# Patient Record
Sex: FEMALE | Race: WHITE | NOT HISPANIC OR LATINO | Employment: OTHER | URBAN - NONMETROPOLITAN AREA
[De-identification: names, ages, dates, MRNs, and addresses within clinical notes are randomized per-mention and may not be internally consistent; named-entity substitution may affect disease eponyms.]

---

## 2017-10-09 ENCOUNTER — APPOINTMENT (OUTPATIENT)
Dept: GENERAL RADIOLOGY | Facility: HOSPITAL | Age: 37
End: 2017-10-09

## 2017-10-09 ENCOUNTER — APPOINTMENT (OUTPATIENT)
Dept: CT IMAGING | Facility: HOSPITAL | Age: 37
End: 2017-10-09

## 2017-10-09 ENCOUNTER — HOSPITAL ENCOUNTER (EMERGENCY)
Facility: HOSPITAL | Age: 37
Discharge: HOME | End: 2017-10-09
Attending: EMERGENCY MEDICINE | Admitting: EMERGENCY MEDICINE

## 2017-10-09 VITALS
WEIGHT: 192 LBS | HEIGHT: 62 IN | TEMPERATURE: 98.6 F | HEART RATE: 116 BPM | BODY MASS INDEX: 35.33 KG/M2 | SYSTOLIC BLOOD PRESSURE: 126 MMHG | RESPIRATION RATE: 16 BRPM | DIASTOLIC BLOOD PRESSURE: 93 MMHG | OXYGEN SATURATION: 96 %

## 2017-10-09 DIAGNOSIS — M25.562 ACUTE PAIN OF BOTH KNEES: ICD-10-CM
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
## 2017-10-09 DIAGNOSIS — M54.50 ACUTE BILATERAL LOW BACK PAIN WITHOUT SCIATICA: Primary | ICD-10-CM

## 2017-10-09 DIAGNOSIS — M25.552 PAIN OF BOTH HIP JOINTS: ICD-10-CM

## 2017-10-09 DIAGNOSIS — M25.551 PAIN OF BOTH HIP JOINTS: ICD-10-CM

## 2017-10-09 DIAGNOSIS — M25.561 ACUTE PAIN OF BOTH KNEES: ICD-10-CM

## 2017-10-09 LAB
ALBUMIN SERPL-MCNC: 5 G/DL (ref 3.4–4.8)
ALBUMIN/GLOB SERPL: 1.3 G/DL (ref 1.1–1.8)
ALP SERPL-CCNC: 94 U/L (ref 38–126)
ALT SERPL W P-5'-P-CCNC: 50 U/L (ref 9–52)
ANION GAP SERPL CALCULATED.3IONS-SCNC: 13 MMOL/L (ref 5–15)
AST SERPL-CCNC: 25 U/L (ref 14–36)
BASOPHILS # BLD AUTO: 0.02 10*3/MM3 (ref 0–0.2)
BASOPHILS NFR BLD AUTO: 0.2 % (ref 0–2)
BILIRUB SERPL-MCNC: 0.5 MG/DL (ref 0.2–1.3)
BUN BLD-MCNC: 17 MG/DL (ref 7–21)
BUN/CREAT SERPL: 17.7 (ref 7–25)
CALCIUM SPEC-SCNC: 10.3 MG/DL (ref 8.4–10.2)
CHLORIDE SERPL-SCNC: 99 MMOL/L (ref 95–110)
CO2 SERPL-SCNC: 26 MMOL/L (ref 22–31)
CREAT BLD-MCNC: 0.96 MG/DL (ref 0.5–1)
DEPRECATED RDW RBC AUTO: 45.2 FL (ref 36.4–46.3)
EOSINOPHIL # BLD AUTO: 0.02 10*3/MM3 (ref 0–0.7)
EOSINOPHIL NFR BLD AUTO: 0.2 % (ref 0–7)
ERYTHROCYTE [DISTWIDTH] IN BLOOD BY AUTOMATED COUNT: 13.8 % (ref 11.5–14.5)
GFR SERPL CREATININE-BSD FRML MDRD: 66 ML/MIN/1.73 (ref 64–149)
GLOBULIN UR ELPH-MCNC: 3.9 GM/DL (ref 2.3–3.5)
GLUCOSE BLD-MCNC: 115 MG/DL (ref 60–100)
HCT VFR BLD AUTO: 49.1 % (ref 35–45)
HGB BLD-MCNC: 16.7 G/DL (ref 12–15.5)
HOLD SPECIMEN: NORMAL
HOLD SPECIMEN: NORMAL
IMM GRANULOCYTES # BLD: 0.07 10*3/MM3 (ref 0–0.02)
IMM GRANULOCYTES NFR BLD: 0.6 % (ref 0–0.5)
INR PPP: 0.96 (ref 0.8–1.2)
LYMPHOCYTES # BLD AUTO: 1.98 10*3/MM3 (ref 0.6–4.2)
LYMPHOCYTES NFR BLD AUTO: 17.9 % (ref 10–50)
MCH RBC QN AUTO: 30.8 PG (ref 26.5–34)
MCHC RBC AUTO-ENTMCNC: 34 G/DL (ref 31.4–36)
MCV RBC AUTO: 90.4 FL (ref 80–98)
MONOCYTES # BLD AUTO: 0.88 10*3/MM3 (ref 0–0.9)
MONOCYTES NFR BLD AUTO: 7.9 % (ref 0–12)
NEUTROPHILS # BLD AUTO: 8.12 10*3/MM3 (ref 2–8.6)
NEUTROPHILS NFR BLD AUTO: 73.2 % (ref 37–80)
PLATELET # BLD AUTO: 331 10*3/MM3 (ref 150–450)
PMV BLD AUTO: 9.4 FL (ref 8–12)
POTASSIUM BLD-SCNC: 4.4 MMOL/L (ref 3.5–5.1)
PROT SERPL-MCNC: 8.9 G/DL (ref 6.3–8.6)
PROTHROMBIN TIME: 12.7 SECONDS (ref 11.1–15.3)
RBC # BLD AUTO: 5.43 10*6/MM3 (ref 3.77–5.16)
SODIUM BLD-SCNC: 138 MMOL/L (ref 137–145)
WBC NRBC COR # BLD: 11.09 10*3/MM3 (ref 3.2–9.8)
WHOLE BLOOD HOLD SPECIMEN: NORMAL
WHOLE BLOOD HOLD SPECIMEN: NORMAL

## 2017-10-09 PROCEDURE — 25010000002 HYDROMORPHONE PER 4 MG: Performed by: EMERGENCY MEDICINE

## 2017-10-09 PROCEDURE — 25010000002 ONDANSETRON PER 1 MG: Performed by: PHYSICIAN ASSISTANT

## 2017-10-09 PROCEDURE — 96375 TX/PRO/DX INJ NEW DRUG ADDON: CPT

## 2017-10-09 PROCEDURE — 72072 X-RAY EXAM THORAC SPINE 3VWS: CPT

## 2017-10-09 PROCEDURE — 99283 EMERGENCY DEPT VISIT LOW MDM: CPT

## 2017-10-09 PROCEDURE — 86901 BLOOD TYPING SEROLOGIC RH(D): CPT | Performed by: EMERGENCY MEDICINE

## 2017-10-09 PROCEDURE — 85610 PROTHROMBIN TIME: CPT | Performed by: EMERGENCY MEDICINE

## 2017-10-09 PROCEDURE — 36415 COLL VENOUS BLD VENIPUNCTURE: CPT

## 2017-10-09 PROCEDURE — 72170 X-RAY EXAM OF PELVIS: CPT

## 2017-10-09 PROCEDURE — 86900 BLOOD TYPING SEROLOGIC ABO: CPT | Performed by: EMERGENCY MEDICINE

## 2017-10-09 PROCEDURE — 73564 X-RAY EXAM KNEE 4 OR MORE: CPT

## 2017-10-09 PROCEDURE — 70450 CT HEAD/BRAIN W/O DYE: CPT

## 2017-10-09 PROCEDURE — 73590 X-RAY EXAM OF LOWER LEG: CPT

## 2017-10-09 PROCEDURE — 96374 THER/PROPH/DIAG INJ IV PUSH: CPT

## 2017-10-09 PROCEDURE — 86850 RBC ANTIBODY SCREEN: CPT | Performed by: EMERGENCY MEDICINE

## 2017-10-09 PROCEDURE — 80053 COMPREHEN METABOLIC PANEL: CPT | Performed by: EMERGENCY MEDICINE

## 2017-10-09 PROCEDURE — 73552 X-RAY EXAM OF FEMUR 2/>: CPT

## 2017-10-09 PROCEDURE — 72100 X-RAY EXAM L-S SPINE 2/3 VWS: CPT

## 2017-10-09 PROCEDURE — 96376 TX/PRO/DX INJ SAME DRUG ADON: CPT

## 2017-10-09 PROCEDURE — 85025 COMPLETE CBC W/AUTO DIFF WBC: CPT | Performed by: EMERGENCY MEDICINE

## 2017-10-09 PROCEDURE — 72125 CT NECK SPINE W/O DYE: CPT

## 2017-10-09 RX ORDER — CYCLOBENZAPRINE HCL 10 MG
10 TABLET ORAL 3 TIMES DAILY PRN
Qty: 30 TABLET | Refills: 0 | Status: SHIPPED | OUTPATIENT
Start: 2017-10-09 | End: 2017-10-19

## 2017-10-09 RX ORDER — IBUPROFEN 800 MG/1
800 TABLET ORAL EVERY 6 HOURS PRN
Qty: 40 TABLET | Refills: 0 | Status: SHIPPED | OUTPATIENT
Start: 2017-10-09 | End: 2017-10-19

## 2017-10-09 RX ORDER — ONDANSETRON 2 MG/ML
4 INJECTION INTRAMUSCULAR; INTRAVENOUS ONCE
Status: COMPLETED | OUTPATIENT
Start: 2017-10-09 | End: 2017-10-09

## 2017-10-09 RX ADMIN — HYDROMORPHONE HYDROCHLORIDE 1 MG: 1 INJECTION, SOLUTION INTRAMUSCULAR; INTRAVENOUS; SUBCUTANEOUS at 18:42

## 2017-10-09 RX ADMIN — HYDROMORPHONE HYDROCHLORIDE 1 MG: 1 INJECTION, SOLUTION INTRAMUSCULAR; INTRAVENOUS; SUBCUTANEOUS at 20:53

## 2017-10-09 RX ADMIN — ONDANSETRON 4 MG: 2 INJECTION INTRAMUSCULAR; INTRAVENOUS at 18:42

## 2017-10-10 LAB
ABO GROUP BLD: NORMAL
BLD GP AB SCN SERPL QL: NEGATIVE
Lab: NORMAL
RH BLD: POSITIVE

## 2017-10-12 ENCOUNTER — HOSPITAL ENCOUNTER (EMERGENCY)
Facility: HOSPITAL | Age: 37
Discharge: HOME | End: 2017-10-12
Attending: EMERGENCY MEDICINE | Admitting: EMERGENCY MEDICINE

## 2017-10-12 ENCOUNTER — APPOINTMENT (OUTPATIENT)
Dept: GENERAL RADIOLOGY | Facility: HOSPITAL | Age: 37
End: 2017-10-12

## 2017-10-12 ENCOUNTER — APPOINTMENT (OUTPATIENT)
Dept: ULTRASOUND IMAGING | Facility: HOSPITAL | Age: 37
End: 2017-10-12

## 2017-10-12 VITALS
OXYGEN SATURATION: 98 % | DIASTOLIC BLOOD PRESSURE: 61 MMHG | WEIGHT: 185.2 LBS | BODY MASS INDEX: 36.36 KG/M2 | HEART RATE: 92 BPM | TEMPERATURE: 97.6 F | RESPIRATION RATE: 18 BRPM | SYSTOLIC BLOOD PRESSURE: 110 MMHG | HEIGHT: 60 IN

## 2017-10-12 DIAGNOSIS — R11.2 NON-INTRACTABLE VOMITING WITH NAUSEA, UNSPECIFIED VOMITING TYPE: ICD-10-CM

## 2017-10-12 DIAGNOSIS — F41.1 GENERALIZED ANXIETY DISORDER: ICD-10-CM

## 2017-10-12 DIAGNOSIS — R10.33 PERIUMBILICAL ABDOMINAL PAIN: Primary | ICD-10-CM

## 2017-10-12 DIAGNOSIS — F17.200 TOBACCO DEPENDENCE SYNDROME: ICD-10-CM

## 2017-10-12 LAB
ALBUMIN SERPL-MCNC: 4.3 G/DL (ref 3.4–4.8)
ALBUMIN/GLOB SERPL: 1.4 G/DL (ref 1.1–1.8)
ALP SERPL-CCNC: 73 U/L (ref 38–126)
ALT SERPL W P-5'-P-CCNC: 44 U/L (ref 9–52)
ANION GAP SERPL CALCULATED.3IONS-SCNC: 11 MMOL/L (ref 5–15)
AST SERPL-CCNC: 17 U/L (ref 14–36)
BASOPHILS # BLD AUTO: 0.03 10*3/MM3 (ref 0–0.2)
BASOPHILS NFR BLD AUTO: 0.2 % (ref 0–2)
BILIRUB SERPL-MCNC: 0.6 MG/DL (ref 0.2–1.3)
BUN BLD-MCNC: 11 MG/DL (ref 7–21)
BUN/CREAT SERPL: 13.6 (ref 7–25)
CALCIUM SPEC-SCNC: 9.6 MG/DL (ref 8.4–10.2)
CHLORIDE SERPL-SCNC: 99 MMOL/L (ref 95–110)
CO2 SERPL-SCNC: 26 MMOL/L (ref 22–31)
CREAT BLD-MCNC: 0.81 MG/DL (ref 0.5–1)
DEPRECATED RDW RBC AUTO: 44.3 FL (ref 36.4–46.3)
EOSINOPHIL # BLD AUTO: 0.05 10*3/MM3 (ref 0–0.7)
EOSINOPHIL NFR BLD AUTO: 0.4 % (ref 0–7)
ERYTHROCYTE [DISTWIDTH] IN BLOOD BY AUTOMATED COUNT: 13.5 % (ref 11.5–14.5)
GFR SERPL CREATININE-BSD FRML MDRD: 80 ML/MIN/1.73 (ref 64–149)
GLOBULIN UR ELPH-MCNC: 3.1 GM/DL (ref 2.3–3.5)
GLUCOSE BLD-MCNC: 106 MG/DL (ref 60–100)
HCT VFR BLD AUTO: 43.6 % (ref 35–45)
HGB BLD-MCNC: 14.7 G/DL (ref 12–15.5)
IMM GRANULOCYTES # BLD: 0.04 10*3/MM3 (ref 0–0.02)
IMM GRANULOCYTES NFR BLD: 0.3 % (ref 0–0.5)
LIPASE SERPL-CCNC: 129 U/L (ref 23–300)
LYMPHOCYTES # BLD AUTO: 2.65 10*3/MM3 (ref 0.6–4.2)
LYMPHOCYTES NFR BLD AUTO: 21.9 % (ref 10–50)
MCH RBC QN AUTO: 30.5 PG (ref 26.5–34)
MCHC RBC AUTO-ENTMCNC: 33.7 G/DL (ref 31.4–36)
MCV RBC AUTO: 90.5 FL (ref 80–98)
MONOCYTES # BLD AUTO: 0.89 10*3/MM3 (ref 0–0.9)
MONOCYTES NFR BLD AUTO: 7.4 % (ref 0–12)
NEUTROPHILS # BLD AUTO: 8.43 10*3/MM3 (ref 2–8.6)
NEUTROPHILS NFR BLD AUTO: 69.8 % (ref 37–80)
PLATELET # BLD AUTO: 288 10*3/MM3 (ref 150–450)
PMV BLD AUTO: 9.2 FL (ref 8–12)
POTASSIUM BLD-SCNC: 3.4 MMOL/L (ref 3.5–5.1)
PROT SERPL-MCNC: 7.4 G/DL (ref 6.3–8.6)
RBC # BLD AUTO: 4.82 10*6/MM3 (ref 3.77–5.16)
SODIUM BLD-SCNC: 136 MMOL/L (ref 137–145)
WBC NRBC COR # BLD: 12.09 10*3/MM3 (ref 3.2–9.8)

## 2017-10-12 PROCEDURE — 76700 US EXAM ABDOM COMPLETE: CPT

## 2017-10-12 PROCEDURE — 96374 THER/PROPH/DIAG INJ IV PUSH: CPT

## 2017-10-12 PROCEDURE — 25010000002 ONDANSETRON PER 1 MG: Performed by: PHYSICIAN ASSISTANT

## 2017-10-12 PROCEDURE — 99283 EMERGENCY DEPT VISIT LOW MDM: CPT

## 2017-10-12 PROCEDURE — 85025 COMPLETE CBC W/AUTO DIFF WBC: CPT | Performed by: PHYSICIAN ASSISTANT

## 2017-10-12 PROCEDURE — 74000 HC ABDOMEN KUB: CPT

## 2017-10-12 PROCEDURE — 96361 HYDRATE IV INFUSION ADD-ON: CPT

## 2017-10-12 PROCEDURE — 80053 COMPREHEN METABOLIC PANEL: CPT | Performed by: PHYSICIAN ASSISTANT

## 2017-10-12 PROCEDURE — 83690 ASSAY OF LIPASE: CPT | Performed by: PHYSICIAN ASSISTANT

## 2017-10-12 RX ORDER — ALPRAZOLAM 1 MG/1
0.5 TABLET ORAL 3 TIMES DAILY PRN
Qty: 9 TABLET | Refills: 0 | Status: SHIPPED | OUTPATIENT
Start: 2017-10-12 | End: 2017-10-15

## 2017-10-12 RX ORDER — ONDANSETRON 2 MG/ML
4 INJECTION INTRAMUSCULAR; INTRAVENOUS ONCE
Status: COMPLETED | OUTPATIENT
Start: 2017-10-12 | End: 2017-10-12

## 2017-10-12 RX ORDER — SODIUM CHLORIDE 0.9 % (FLUSH) 0.9 %
10 SYRINGE (ML) INJECTION AS NEEDED
Status: DISCONTINUED | OUTPATIENT
Start: 2017-10-12 | End: 2017-10-12 | Stop reason: HOSPADM

## 2017-10-12 RX ORDER — SODIUM CHLORIDE 9 MG/ML
1000 INJECTION, SOLUTION INTRAVENOUS ONCE
Status: COMPLETED | OUTPATIENT
Start: 2017-10-12 | End: 2017-10-12

## 2017-10-12 RX ORDER — ONDANSETRON 4 MG/1
4 TABLET, FILM COATED ORAL EVERY 6 HOURS
Qty: 12 TABLET | Refills: 0 | Status: SHIPPED | OUTPATIENT
Start: 2017-10-12 | End: 2017-10-15

## 2017-10-12 RX ADMIN — Medication 10 ML: at 16:33

## 2017-10-12 RX ADMIN — ONDANSETRON 4 MG: 2 INJECTION INTRAMUSCULAR; INTRAVENOUS at 16:33

## 2017-10-12 RX ADMIN — SODIUM CHLORIDE 1000 ML: 9 INJECTION, SOLUTION INTRAVENOUS at 16:33
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
## 2017-10-12 NOTE — ED NOTES
"Pt is presented to ED with c/o severe abdominal pain.  Pt states she suddenly started having severe cramping that is located around her \"belly button\" and vomiting just pta.     Alem Armenta RN  10/12/17 8622    "

## 2017-10-16 DIAGNOSIS — M79.641 BILATERAL HAND PAIN: Primary | ICD-10-CM

## 2017-10-16 DIAGNOSIS — M79.642 BILATERAL HAND PAIN: Primary | ICD-10-CM
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
## 2017-10-27 ENCOUNTER — HOSPITAL ENCOUNTER (EMERGENCY)
Facility: HOSPITAL | Age: 37
Discharge: HOME | End: 2017-10-27
Attending: FAMILY MEDICINE | Admitting: FAMILY MEDICINE

## 2017-10-27 VITALS
HEART RATE: 93 BPM | OXYGEN SATURATION: 97 % | TEMPERATURE: 98.1 F | DIASTOLIC BLOOD PRESSURE: 92 MMHG | RESPIRATION RATE: 18 BRPM | HEIGHT: 60 IN | WEIGHT: 190 LBS | SYSTOLIC BLOOD PRESSURE: 147 MMHG | BODY MASS INDEX: 37.3 KG/M2

## 2017-10-27 DIAGNOSIS — F41.9 ANXIETY: Primary | ICD-10-CM

## 2017-10-27 DIAGNOSIS — F41.0 PANIC ATTACKS: ICD-10-CM

## 2017-10-27 PROCEDURE — 99283 EMERGENCY DEPT VISIT LOW MDM: CPT

## 2017-10-27 RX ORDER — TRAZODONE HYDROCHLORIDE 100 MG/1
100 TABLET ORAL 3 TIMES DAILY
Qty: 21 TABLET | Refills: 0 | Status: SHIPPED | OUTPATIENT
Start: 2017-10-27 | End: 2017-11-03

## 2017-10-27 RX ORDER — BUPRENORPHINE HYDROCHLORIDE AND NALOXONE HYDROCHLORIDE DIHYDRATE 8; 2 MG/1; MG/1
TABLET SUBLINGUAL DAILY
COMMUNITY

## 2017-10-27 RX ORDER — ALPRAZOLAM 1 MG/1
1 TABLET ORAL ONCE
Status: COMPLETED | OUTPATIENT
Start: 2017-10-27 | End: 2017-10-27

## 2017-10-27 RX ADMIN — ALPRAZOLAM 1 MG: 1 TABLET ORAL at 11:01

## 2017-10-30 NOTE — ED PROVIDER NOTES
Subjective   Patient is a 37 y.o. female presenting with anxiety.   History provided by:  Patient   used: No    Anxiety   Presents for initial visit. Onset was in the past 7 days. The problem has been unchanged. Symptoms include decreased concentration, depressed mood, dry mouth, excessive worry, insomnia, irritability, nausea, nervous/anxious behavior and panic. Patient reports no chest pain, compulsions, confusion, dizziness, feeling of choking, hyperventilation, impotence, malaise, muscle tension, obsessions, palpitations, restlessness, shortness of breath or suicidal ideas. Symptoms occur most days. The most recent episode lasted 5 minutes. The severity of symptoms is mild. The symptoms are aggravated by family issues. The patient sleeps 3 hours per night. The quality of sleep is poor. Nighttime awakenings: several.     Risk factors include a major life event. Her past medical history is significant for anxiety/panic attacks. There is no history of anemia, arrhythmia, asthma, bipolar disorder, CAD, CHF, chronic lung disease, depression, fibromyalgia, hyperthyroidism or suicide attempts. Past treatments include SSRIs. The treatment provided no relief. Compliance with prior treatments has been good.       Review of Systems   Constitutional: Positive for irritability. Negative for activity change, appetite change, chills, diaphoresis, fatigue, fever and unexpected weight change.   HENT: Negative for congestion, dental problem, drooling, ear discharge, ear pain, facial swelling, hearing loss, mouth sores, nosebleeds, postnasal drip, rhinorrhea, sinus pain, sinus pressure, sneezing, sore throat, tinnitus, trouble swallowing and voice change.    Eyes: Negative.    Respiratory: Negative.  Negative for shortness of breath.    Cardiovascular: Negative.  Negative for chest pain and palpitations.   Gastrointestinal: Positive for nausea. Negative for abdominal distention, abdominal pain, anal bleeding,  blood in stool, constipation, diarrhea, rectal pain and vomiting.   Endocrine: Negative.    Genitourinary: Negative.  Negative for impotence.   Musculoskeletal: Negative.    Skin: Negative.    Allergic/Immunologic: Negative.    Neurological: Negative.  Negative for dizziness.   Hematological: Negative.    Psychiatric/Behavioral: Positive for decreased concentration. Negative for agitation, behavioral problems, confusion, dysphoric mood, hallucinations, self-injury, sleep disturbance and suicidal ideas. The patient is nervous/anxious and has insomnia. The patient is not hyperactive.        Past Medical History:   Diagnosis Date   • Acute maxillary sinusitis    • Adjustment disorder with anxiety    • Adjustment disorder with anxious mood    • Biliary calculus     post cholecystectomy      • Candidiasis    • Central abdominal pain    • Chronic pain    • Community acquired pneumonia    • Dental abscess    • Dental caries     o/e   • Depressive disorder      with some anxiety      • Depressive disorder     not elsewhere classified      • Essential hypertension    • Essential hypertension    • Frequent hospital admissions    • Gastroesophageal reflux disease    • Gastroesophageal reflux disease    • Generalized anxiety disorder    • Hyperlipidemia    • Hyperreflexia    • Influenza     needs immunization   • Irritable bowel syndrome    • Major depressive disorder    • Malaise and fatigue    • Multiple joint pain    • Nausea and vomiting    • Need for prophylactic vaccination against Streptococcus pneumoniae (pneumococcus)    • Obesity    • Rhonchi     low-pitched   • Seizures    • Tobacco dependence syndrome    • Upper respiratory infection        Allergies   Allergen Reactions   • Ceclor [Cefaclor]    • Other      darvocet-N 100   • Reglan [Metoclopramide]    • Valtrex [Valacyclovir Hcl]    • Zithromax [Azithromycin]      z-iam       Past Surgical History:   Procedure Laterality Date   • CHOLECYSTECTOMY      laparoscopic  (With cholangiogram. Symptomatic gallstones.)   06/03/2015    • ENDOSCOPY      w/ biopsy [id number] (Gastritis found inthe body of the stomach. EGD with biopsy.)   07/21/2015    • ENDOSCOPY      w/ tube 90907 (Normal esophagus. Gastritis in stomach. Biopsy taken. Normal duodenum. Biopsy taken.)   10/19/2011    • HYSTEROSCOPY  07/09/2008   • TOTAL ABDOMINAL HYSTERECTOMY WITH SALPINGO OOPHORECTOMY  09/03/2008   • TUBAL ABDOMINAL LIGATION  08/21/2006   • VAGINAL DELIVERY      , P2, had pre-eclampsia with both       Family History   Problem Relation Age of Onset   • Other Other      depressive disorder   • Diabetes Other    • Hypertension Other    • Other Other      Mother is diabetic, hypertensive, anxiety, depression. Father is 61, diabetic, hypertensive, had a CABG, first MI was at 50. He has had CVA's, TIA's. 10-year-old sister who is Type I diabetic       Social History     Social History   • Marital status:      Spouse name: N/A   • Number of children: N/A   • Years of education: N/A     Social History Main Topics   • Smoking status: Current Every Day Smoker     Packs/day: 1.00     Types: Cigarettes   • Smokeless tobacco: Never Used   • Alcohol use No   • Drug use: No   • Sexual activity: Not Asked     Other Topics Concern   • None     Social History Narrative   • None           Objective   Physical Exam   Constitutional: She is oriented to person, place, and time. She appears well-developed and well-nourished. No distress.   HENT:   Head: Normocephalic and atraumatic.   Eyes: Conjunctivae and EOM are normal. Pupils are equal, round, and reactive to light. Right eye exhibits no discharge. Left eye exhibits no discharge. No scleral icterus.   Neck: Normal range of motion. Neck supple. No JVD present. No tracheal deviation present. No thyromegaly present.   Cardiovascular: Normal rate, regular rhythm, normal heart sounds and intact distal pulses.  Exam reveals no gallop and no friction rub.    No murmur  heard.  Pulmonary/Chest: Effort normal and breath sounds normal. No stridor. No respiratory distress. She has no wheezes. She has no rales. She exhibits no tenderness.   Abdominal: Soft. Bowel sounds are normal. She exhibits no distension and no mass. There is no tenderness. There is no rebound and no guarding. No hernia.   Musculoskeletal: Normal range of motion. She exhibits no edema, tenderness or deformity.   Lymphadenopathy:     She has no cervical adenopathy.   Neurological: She is alert and oriented to person, place, and time. She has normal reflexes.   Skin: Skin is warm and dry. No rash noted. She is not diaphoretic. No erythema. No pallor.   Psychiatric: Her speech is normal and behavior is normal. Judgment and thought content normal. Her mood appears anxious. Her affect is not angry, not blunt, not labile and not inappropriate. She is not actively hallucinating. Cognition and memory are normal. She exhibits a depressed mood. She is attentive.   Nursing note and vitals reviewed.      Procedures         ED Course  ED Course                  MDM  Number of Diagnoses or Management Options  Anxiety:   Panic attacks:       Final diagnoses:   Anxiety   Panic attacks            JAIMIE Springer  10/30/17 3234

## 2017-12-19 ENCOUNTER — OFFICE VISIT (OUTPATIENT)
Dept: ORTHOPEDIC SURGERY | Facility: CLINIC | Age: 37
End: 2017-12-19

## 2017-12-19 VITALS — HEIGHT: 60 IN | BODY MASS INDEX: 37.5 KG/M2 | WEIGHT: 191 LBS

## 2017-12-19 DIAGNOSIS — M79.641 PAIN IN BOTH HANDS: ICD-10-CM

## 2017-12-19 DIAGNOSIS — R20.2 NUMBNESS AND TINGLING IN BOTH HANDS: Primary | ICD-10-CM

## 2017-12-19 DIAGNOSIS — R20.0 NUMBNESS AND TINGLING IN BOTH HANDS: Primary | ICD-10-CM

## 2017-12-19 DIAGNOSIS — M25.562 ACUTE PAIN OF LEFT KNEE: ICD-10-CM

## 2017-12-19 DIAGNOSIS — M79.642 PAIN IN BOTH HANDS: ICD-10-CM

## 2017-12-19 DIAGNOSIS — M25.462 EFFUSION OF KNEE JOINT, LEFT: ICD-10-CM

## 2017-12-19 PROCEDURE — 99214 OFFICE O/P EST MOD 30 MIN: CPT | Performed by: NURSE PRACTITIONER

## 2017-12-19 RX ORDER — DICLOFENAC SODIUM 75 MG/1
75 TABLET, DELAYED RELEASE ORAL EVERY 12 HOURS PRN
Qty: 60 TABLET | Refills: 1 | Status: SHIPPED | OUTPATIENT
Start: 2017-12-19 | End: 2017-12-21 | Stop reason: SDUPTHER
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
## 2017-12-21 ENCOUNTER — TELEPHONE (OUTPATIENT)
Dept: ORTHOPEDIC SURGERY | Facility: CLINIC | Age: 37
End: 2017-12-21

## 2017-12-21 RX ORDER — DICLOFENAC SODIUM 75 MG/1
75 TABLET, DELAYED RELEASE ORAL EVERY 12 HOURS PRN
Qty: 60 TABLET | Refills: 1 | Status: SHIPPED | OUTPATIENT
Start: 2017-12-21 | End: 2018-01-20

## 2017-12-29 ENCOUNTER — APPOINTMENT (OUTPATIENT)
Dept: MRI IMAGING | Facility: HOSPITAL | Age: 37
End: 2017-12-29

## 2018-01-11 ENCOUNTER — HOSPITAL ENCOUNTER (OUTPATIENT)
Dept: MRI IMAGING | Facility: HOSPITAL | Age: 38
Discharge: HOME | End: 2018-01-11
Admitting: NURSE PRACTITIONER

## 2018-01-11 DIAGNOSIS — M25.562 ACUTE PAIN OF LEFT KNEE: ICD-10-CM

## 2018-01-11 DIAGNOSIS — M25.462 EFFUSION OF KNEE JOINT, LEFT: ICD-10-CM

## 2018-01-11 PROCEDURE — 73721 MRI JNT OF LWR EXTRE W/O DYE: CPT

## 2018-01-18 RX ORDER — DICLOFENAC SODIUM 75 MG/1
TABLET, DELAYED RELEASE ORAL
Qty: 60 TABLET | Refills: 1 | Status: SHIPPED | OUTPATIENT
Start: 2018-01-18 | End: 2019-02-21 | Stop reason: HOSPADM

## 2018-02-07 ENCOUNTER — APPOINTMENT (OUTPATIENT)
Dept: NEUROLOGY | Facility: HOSPITAL | Age: 38
End: 2018-02-07

## 2018-10-17 ENCOUNTER — OFFICE VISIT (OUTPATIENT)
Dept: ORTHOPEDIC SURGERY | Facility: CLINIC | Age: 38
End: 2018-10-17

## 2018-10-17 VITALS — BODY MASS INDEX: 35.79 KG/M2 | WEIGHT: 202 LBS | HEIGHT: 63 IN

## 2018-10-17 DIAGNOSIS — M25.562 CHRONIC PAIN OF LEFT KNEE: Primary | ICD-10-CM

## 2018-10-17 DIAGNOSIS — G89.29 CHRONIC PAIN OF LEFT KNEE: Primary | ICD-10-CM

## 2018-10-17 DIAGNOSIS — M25.462 EFFUSION OF KNEE JOINT, LEFT: ICD-10-CM

## 2018-10-17 PROCEDURE — 96372 THER/PROPH/DIAG INJ SC/IM: CPT | Performed by: NURSE PRACTITIONER

## 2018-10-17 PROCEDURE — 20610 DRAIN/INJ JOINT/BURSA W/O US: CPT | Performed by: NURSE PRACTITIONER

## 2018-10-17 PROCEDURE — 99214 OFFICE O/P EST MOD 30 MIN: CPT | Performed by: NURSE PRACTITIONER

## 2018-10-17 RX ORDER — BUPROPION HYDROCHLORIDE 150 MG/1
150 TABLET ORAL DAILY
COMMUNITY
End: 2019-02-17

## 2018-10-17 RX ORDER — KETOROLAC TROMETHAMINE 30 MG/ML
60 INJECTION, SOLUTION INTRAMUSCULAR; INTRAVENOUS ONCE
Status: COMPLETED | OUTPATIENT
Start: 2018-10-17 | End: 2018-10-17

## 2018-10-17 RX ADMIN — LIDOCAINE HYDROCHLORIDE 2 ML: 10 INJECTION, SOLUTION EPIDURAL; INFILTRATION; INTRACAUDAL; PERINEURAL at 10:54

## 2018-10-17 RX ADMIN — TRIAMCINOLONE ACETONIDE 40 MG: 40 INJECTION, SUSPENSION INTRA-ARTICULAR; INTRAMUSCULAR at 10:54

## 2018-10-17 RX ADMIN — KETOROLAC TROMETHAMINE 60 MG: 30 INJECTION, SOLUTION INTRAMUSCULAR; INTRAVENOUS at 10:57
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
## 2018-10-20 RX ORDER — LIDOCAINE HYDROCHLORIDE 10 MG/ML
2 INJECTION, SOLUTION EPIDURAL; INFILTRATION; INTRACAUDAL; PERINEURAL
Status: COMPLETED | OUTPATIENT
Start: 2018-10-17 | End: 2018-10-17

## 2018-10-20 RX ORDER — TRIAMCINOLONE ACETONIDE 40 MG/ML
40 INJECTION, SUSPENSION INTRA-ARTICULAR; INTRAMUSCULAR
Status: COMPLETED | OUTPATIENT
Start: 2018-10-17 | End: 2018-10-17

## 2018-11-13 DIAGNOSIS — M25.562 CHRONIC PAIN OF LEFT KNEE: Primary | ICD-10-CM

## 2018-11-13 DIAGNOSIS — G89.29 CHRONIC PAIN OF LEFT KNEE: Primary | ICD-10-CM

## 2018-11-19 ENCOUNTER — HOSPITAL ENCOUNTER (EMERGENCY)
Facility: HOSPITAL | Age: 38
Discharge: HOME | End: 2018-11-19
Attending: FAMILY MEDICINE | Admitting: FAMILY MEDICINE

## 2018-11-19 ENCOUNTER — APPOINTMENT (OUTPATIENT)
Dept: CT IMAGING | Facility: HOSPITAL | Age: 38
End: 2018-11-19

## 2018-11-19 VITALS
BODY MASS INDEX: 39.54 KG/M2 | WEIGHT: 201.4 LBS | DIASTOLIC BLOOD PRESSURE: 93 MMHG | HEIGHT: 60 IN | TEMPERATURE: 98.5 F | SYSTOLIC BLOOD PRESSURE: 140 MMHG | RESPIRATION RATE: 20 BRPM | HEART RATE: 88 BPM | OXYGEN SATURATION: 98 %

## 2018-11-19 DIAGNOSIS — R11.2 NAUSEA AND VOMITING, INTRACTABILITY OF VOMITING NOT SPECIFIED, UNSPECIFIED VOMITING TYPE: ICD-10-CM

## 2018-11-19 DIAGNOSIS — R11.0 NAUSEA: ICD-10-CM

## 2018-11-19 DIAGNOSIS — R10.12 LEFT UPPER QUADRANT PAIN: Primary | ICD-10-CM

## 2018-11-19 LAB
ALBUMIN SERPL-MCNC: 4.4 G/DL (ref 3.4–4.8)
ALBUMIN/GLOB SERPL: 1.6 G/DL (ref 1.1–1.8)
ALP SERPL-CCNC: 104 U/L (ref 38–126)
ALT SERPL W P-5'-P-CCNC: 29 U/L (ref 9–52)
ANION GAP SERPL CALCULATED.3IONS-SCNC: 11 MMOL/L (ref 5–15)
AST SERPL-CCNC: 34 U/L (ref 14–36)
BACTERIA UR QL AUTO: NORMAL /HPF
BASOPHILS # BLD AUTO: 0.03 10*3/MM3 (ref 0–0.2)
BASOPHILS NFR BLD AUTO: 0.5 % (ref 0–2)
BILIRUB SERPL-MCNC: 0.3 MG/DL (ref 0.2–1.3)
BILIRUB UR QL STRIP: NEGATIVE
BUN BLD-MCNC: 7 MG/DL (ref 7–21)
BUN/CREAT SERPL: 9.2 (ref 7–25)
CALCIUM SPEC-SCNC: 9.3 MG/DL (ref 8.4–10.2)
CHLORIDE SERPL-SCNC: 100 MMOL/L (ref 95–110)
CLARITY UR: CLEAR
CO2 SERPL-SCNC: 26 MMOL/L (ref 22–31)
COLOR UR: ABNORMAL
CREAT BLD-MCNC: 0.76 MG/DL (ref 0.5–1)
DEPRECATED RDW RBC AUTO: 40.8 FL (ref 36.4–46.3)
EOSINOPHIL # BLD AUTO: 0.14 10*3/MM3 (ref 0–0.7)
EOSINOPHIL NFR BLD AUTO: 2.2 % (ref 0–7)
ERYTHROCYTE [DISTWIDTH] IN BLOOD BY AUTOMATED COUNT: 12.7 % (ref 11.5–14.5)
GFR SERPL CREATININE-BSD FRML MDRD: 86 ML/MIN/1.73 (ref 64–149)
GLOBULIN UR ELPH-MCNC: 2.8 GM/DL (ref 2.3–3.5)
GLUCOSE BLD-MCNC: 91 MG/DL (ref 60–100)
GLUCOSE UR STRIP-MCNC: NEGATIVE MG/DL
HCT VFR BLD AUTO: 42.7 % (ref 35–45)
HGB BLD-MCNC: 14.2 G/DL (ref 12–15.5)
HGB UR QL STRIP.AUTO: NEGATIVE
HOLD SPECIMEN: NORMAL
HOLD SPECIMEN: NORMAL
HYALINE CASTS UR QL AUTO: NORMAL /LPF
IMM GRANULOCYTES # BLD: 0.02 10*3/MM3 (ref 0–0.02)
IMM GRANULOCYTES NFR BLD: 0.3 % (ref 0–0.5)
KETONES UR QL STRIP: NEGATIVE
LEUKOCYTE ESTERASE UR QL STRIP.AUTO: ABNORMAL
LYMPHOCYTES # BLD AUTO: 2 10*3/MM3 (ref 0.6–4.2)
LYMPHOCYTES NFR BLD AUTO: 32.1 % (ref 10–50)
MCH RBC QN AUTO: 29.3 PG (ref 26.5–34)
MCHC RBC AUTO-ENTMCNC: 33.3 G/DL (ref 31.4–36)
MCV RBC AUTO: 88.2 FL (ref 80–98)
MONOCYTES # BLD AUTO: 0.36 10*3/MM3 (ref 0–0.9)
MONOCYTES NFR BLD AUTO: 5.8 % (ref 0–12)
NEUTROPHILS # BLD AUTO: 3.69 10*3/MM3 (ref 2–8.6)
NEUTROPHILS NFR BLD AUTO: 59.1 % (ref 37–80)
NITRITE UR QL STRIP: NEGATIVE
PH UR STRIP.AUTO: 7.5 [PH] (ref 5–9)
PLATELET # BLD AUTO: 262 10*3/MM3 (ref 150–450)
PMV BLD AUTO: 9.1 FL (ref 8–12)
POTASSIUM BLD-SCNC: 4.2 MMOL/L (ref 3.5–5.1)
PROT SERPL-MCNC: 7.2 G/DL (ref 6.3–8.6)
PROT UR QL STRIP: NEGATIVE
RBC # BLD AUTO: 4.84 10*6/MM3 (ref 3.77–5.16)
RBC # UR: NORMAL /HPF
REF LAB TEST METHOD: NORMAL
SODIUM BLD-SCNC: 137 MMOL/L (ref 137–145)
SP GR UR STRIP: 1.01 (ref 1–1.03)
SQUAMOUS #/AREA URNS HPF: NORMAL /HPF
UROBILINOGEN UR QL STRIP: ABNORMAL
WBC NRBC COR # BLD: 6.24 10*3/MM3 (ref 3.2–9.8)
WBC UR QL AUTO: NORMAL /HPF
WHOLE BLOOD HOLD SPECIMEN: NORMAL
WHOLE BLOOD HOLD SPECIMEN: NORMAL

## 2018-11-19 PROCEDURE — 99283 EMERGENCY DEPT VISIT LOW MDM: CPT

## 2018-11-19 PROCEDURE — 85025 COMPLETE CBC W/AUTO DIFF WBC: CPT | Performed by: PHYSICIAN ASSISTANT

## 2018-11-19 PROCEDURE — 25010000002 ONDANSETRON PER 1 MG: Performed by: PHYSICIAN ASSISTANT

## 2018-11-19 PROCEDURE — 96361 HYDRATE IV INFUSION ADD-ON: CPT

## 2018-11-19 PROCEDURE — 96374 THER/PROPH/DIAG INJ IV PUSH: CPT

## 2018-11-19 PROCEDURE — 81001 URINALYSIS AUTO W/SCOPE: CPT | Performed by: PHYSICIAN ASSISTANT

## 2018-11-19 PROCEDURE — 80053 COMPREHEN METABOLIC PANEL: CPT | Performed by: PHYSICIAN ASSISTANT

## 2018-11-19 PROCEDURE — 74176 CT ABD & PELVIS W/O CONTRAST: CPT

## 2018-11-19 RX ORDER — ONDANSETRON 4 MG/1
4 TABLET, ORALLY DISINTEGRATING ORAL EVERY 8 HOURS PRN
Qty: 15 TABLET | Refills: 0 | Status: SHIPPED | OUTPATIENT
Start: 2018-11-19 | End: 2018-11-24

## 2018-11-19 RX ORDER — ONDANSETRON 2 MG/ML
4 INJECTION INTRAMUSCULAR; INTRAVENOUS ONCE
Status: COMPLETED | OUTPATIENT
Start: 2018-11-19 | End: 2018-11-19

## 2018-11-19 RX ADMIN — SODIUM CHLORIDE 1000 ML: 9 INJECTION, SOLUTION INTRAVENOUS at 14:30

## 2018-11-19 RX ADMIN — ONDANSETRON 4 MG: 2 INJECTION INTRAMUSCULAR; INTRAVENOUS at 14:30

## 2018-11-19 NOTE — ED PROVIDER NOTES
Subjective   Pt, hx of recurrent abdominal pain, nausea, and vomiting, reports intermittent LUQ pain and N/V/D started 2 days ago. Intermittent LUQ pain will usually last 4 -5 hours. Pt presents to the ED due to persistent abdominal pain. Last BM was this morning which was diarrhea.         History provided by:  Patient   used: No    Abdominal Pain   Pain location:  LUQ  Pain radiates to:  Does not radiate  Pain severity:  Mild  Onset quality:  Gradual  Duration:  2 days  Timing:  Intermittent  Progression:  Unchanged  Chronicity:  Recurrent  Context: not sick contacts    Relieved by:  Bowel activity  Worsened by:  Nothing  Associated symptoms: anorexia, diarrhea, nausea and vomiting    Associated symptoms: no chest pain, no cough, no dysuria, no fatigue and no shortness of breath        Review of Systems   Constitutional: Negative for fatigue.   HENT: Negative for congestion.    Respiratory: Negative for cough and shortness of breath.    Cardiovascular: Negative for chest pain and leg swelling.   Gastrointestinal: Positive for abdominal pain, anorexia, diarrhea, nausea and vomiting.   Endocrine: Negative for polyuria.   Genitourinary: Negative for dysuria.   Skin: Negative for color change and pallor.   Neurological: Negative for dizziness and syncope.   Hematological: Negative for adenopathy.   Psychiatric/Behavioral: Negative for agitation, behavioral problems and confusion.   All other systems reviewed and are negative.      Past Medical History:   Diagnosis Date   • Acute maxillary sinusitis    • Adjustment disorder with anxiety    • Adjustment disorder with anxious mood    • Biliary calculus     post cholecystectomy      • Candidiasis    • Central abdominal pain    • Chronic pain    • Community acquired pneumonia    • Dental abscess    • Dental caries     o/e   • Depressive disorder      with some anxiety      • Depressive disorder     not elsewhere classified      • Essential hypertension     • Essential hypertension    • Frequent hospital admissions    • Gastroesophageal reflux disease    • Gastroesophageal reflux disease    • Generalized anxiety disorder    • Hyperlipidemia    • Hyperreflexia    • Influenza     needs immunization   • Irritable bowel syndrome    • Major depressive disorder    • Malaise and fatigue    • Multiple joint pain    • Nausea and vomiting    • Need for prophylactic vaccination against Streptococcus pneumoniae (pneumococcus)    • Obesity    • Rhonchi     low-pitched   • Seizures (CMS/HCC)    • Tobacco dependence syndrome    • Upper respiratory infection        Allergies   Allergen Reactions   • Ceclor [Cefaclor]    • Other      darvocet-N 100   • Reglan [Metoclopramide]    • Valtrex [Valacyclovir Hcl]    • Zithromax [Azithromycin]      z-iam       Past Surgical History:   Procedure Laterality Date   • CHOLECYSTECTOMY      laparoscopic (With cholangiogram. Symptomatic gallstones.)   06/03/2015    • ENDOSCOPY      w/ biopsy [id number] (Gastritis found inthe body of the stomach. EGD with biopsy.)   07/21/2015    • ENDOSCOPY      w/ tube 47363 (Normal esophagus. Gastritis in stomach. Biopsy taken. Normal duodenum. Biopsy taken.)   10/19/2011    • HYSTEROSCOPY  07/09/2008   • TOTAL ABDOMINAL HYSTERECTOMY WITH SALPINGO OOPHORECTOMY  09/03/2008   • TUBAL ABDOMINAL LIGATION  08/21/2006   • VAGINAL DELIVERY      , P2, had pre-eclampsia with both       Family History   Problem Relation Age of Onset   • Other Other         depressive disorder   • Diabetes Other    • Hypertension Other    • Other Other         Mother is diabetic, hypertensive, anxiety, depression. Father is 61, diabetic, hypertensive, had a CABG, first MI was at 50. He has had CVA's, TIA's. 10-year-old sister who is Type I diabetic       Social History     Socioeconomic History   • Marital status:      Spouse name: Not on file   • Number of children: Not on file   • Years of education: Not on file   • Highest education  level: Not on file   Tobacco Use   • Smoking status: Current Every Day Smoker     Packs/day: 1.00     Types: Cigarettes   • Smokeless tobacco: Never Used   Substance and Sexual Activity   • Alcohol use: No   • Drug use: No           Objective   Physical Exam   Constitutional: She is oriented to person, place, and time. She appears well-developed and well-nourished.   HENT:   Head: Normocephalic.   Right Ear: Hearing normal.   Left Ear: Hearing normal.   Nose: Nose normal.   Eyes: Conjunctivae, EOM and lids are normal.   Neck: Trachea normal and full passive range of motion without pain.   Cardiovascular: Regular rhythm, S1 normal, S2 normal, normal heart sounds and normal pulses.   Pulmonary/Chest: Effort normal and breath sounds normal.   Abdominal: Normal appearance and bowel sounds are normal. There is tenderness in the left upper quadrant.   Neurological: She is alert and oriented to person, place, and time. She is not disoriented.   Skin: Skin is warm and dry. She is not diaphoretic.   Psychiatric: She has a normal mood and affect. Her speech is normal and behavior is normal. Thought content normal.   Nursing note and vitals reviewed.      Procedures           ED Course      3:26P  Rechecked pt and pt reports feeling better after Zofran. Informed unremarkable results. Will discharge pt on Zofran. Pt agrees with plan of care and all questions addressed at this time.             MDM      Final diagnoses:   Left upper quadrant pain   Nausea and vomiting, intractability of vomiting not specified, unspecified vomiting type            Evelia Santana PA-C  11/19/18 [id number]

## 2018-11-19 NOTE — ED NOTES
Patient comes in today with left upper quadrant pain. Started on Saturday. Last day pain has gone into her back.  Patient states that she does not have her gallbladder.     Ely Felix RN  11/19/18 [id number]

## 2018-12-13 ENCOUNTER — OFFICE VISIT (OUTPATIENT)
Dept: ORTHOPEDIC SURGERY | Facility: CLINIC | Age: 38
End: 2018-12-13

## 2018-12-13 VITALS — BODY MASS INDEX: 39.66 KG/M2 | HEIGHT: 60 IN | WEIGHT: 202 LBS

## 2018-12-13 DIAGNOSIS — M23.92 INTERNAL DERANGEMENT OF LEFT KNEE: ICD-10-CM

## 2018-12-13 DIAGNOSIS — M25.562 CHRONIC PAIN OF LEFT KNEE: Primary | ICD-10-CM

## 2018-12-13 DIAGNOSIS — M25.462 EFFUSION OF KNEE JOINT, LEFT: ICD-10-CM

## 2018-12-13 DIAGNOSIS — G89.29 CHRONIC PAIN OF LEFT KNEE: Primary | ICD-10-CM

## 2018-12-13 PROCEDURE — 99214 OFFICE O/P EST MOD 30 MIN: CPT | Performed by: NURSE PRACTITIONER

## 2018-12-13 PROCEDURE — 20610 DRAIN/INJ JOINT/BURSA W/O US: CPT | Performed by: NURSE PRACTITIONER

## 2018-12-13 RX ORDER — HYDROCHLOROTHIAZIDE 25 MG/1
12.5 TABLET ORAL DAILY
COMMUNITY
End: 2020-05-29 | Stop reason: HOSPADM

## 2018-12-13 RX ORDER — LISINOPRIL 40 MG/1
20 TABLET ORAL DAILY
Status: ON HOLD | COMMUNITY
End: 2020-05-29 | Stop reason: SDUPTHER

## 2018-12-13 RX ORDER — ALPRAZOLAM 1 MG/1
1 TABLET ORAL EVERY 6 HOURS PRN
COMMUNITY

## 2018-12-13 RX ADMIN — TRIAMCINOLONE ACETONIDE 40 MG: 40 INJECTION, SUSPENSION INTRA-ARTICULAR; INTRAMUSCULAR at 14:53

## 2018-12-13 RX ADMIN — LIDOCAINE HYDROCHLORIDE 2 ML: 20 INJECTION, SOLUTION INFILTRATION; PERINEURAL at 14:53
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
## 2018-12-19 PROBLEM — M23.92 INTERNAL DERANGEMENT OF LEFT KNEE: Status: ACTIVE | Noted: 2018-12-19

## 2018-12-19 RX ORDER — TRIAMCINOLONE ACETONIDE 40 MG/ML
40 INJECTION, SUSPENSION INTRA-ARTICULAR; INTRAMUSCULAR
Status: COMPLETED | OUTPATIENT
Start: 2018-12-13 | End: 2018-12-13

## 2018-12-19 RX ORDER — LIDOCAINE HYDROCHLORIDE 20 MG/ML
2 INJECTION, SOLUTION INFILTRATION; PERINEURAL
Status: COMPLETED | OUTPATIENT
Start: 2018-12-13 | End: 2018-12-13

## 2018-12-28 ENCOUNTER — HOSPITAL ENCOUNTER (OUTPATIENT)
Dept: MRI IMAGING | Facility: HOSPITAL | Age: 38
Discharge: HOME | End: 2018-12-28
Admitting: NURSE PRACTITIONER

## 2018-12-28 DIAGNOSIS — G89.29 CHRONIC PAIN OF LEFT KNEE: ICD-10-CM

## 2018-12-28 DIAGNOSIS — M25.462 EFFUSION OF KNEE JOINT, LEFT: ICD-10-CM

## 2018-12-28 DIAGNOSIS — M25.562 CHRONIC PAIN OF LEFT KNEE: ICD-10-CM

## 2018-12-28 DIAGNOSIS — M23.92 INTERNAL DERANGEMENT OF LEFT KNEE: ICD-10-CM

## 2018-12-28 PROCEDURE — 73721 MRI JNT OF LWR EXTRE W/O DYE: CPT

## 2019-01-07 ENCOUNTER — OFFICE VISIT (OUTPATIENT)
Dept: ORTHOPEDIC SURGERY | Facility: CLINIC | Age: 39
End: 2019-01-07

## 2019-01-07 VITALS — BODY MASS INDEX: 39.46 KG/M2 | HEIGHT: 60 IN | WEIGHT: 201 LBS

## 2019-01-07 DIAGNOSIS — M25.562 CHRONIC PAIN OF LEFT KNEE: Primary | ICD-10-CM

## 2019-01-07 DIAGNOSIS — M76.32 ILIOTIBIAL BAND SYNDROME OF LEFT SIDE: ICD-10-CM

## 2019-01-07 DIAGNOSIS — T14.8XXA CONTUSION OF BONE: ICD-10-CM

## 2019-01-07 DIAGNOSIS — G89.29 CHRONIC PAIN OF LEFT KNEE: Primary | ICD-10-CM

## 2019-01-07 PROCEDURE — 99214 OFFICE O/P EST MOD 30 MIN: CPT | Performed by: NURSE PRACTITIONER
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
## 2019-01-16 ENCOUNTER — HOSPITAL ENCOUNTER (OUTPATIENT)
Dept: PHYSICAL THERAPY | Facility: HOSPITAL | Age: 39
Setting detail: THERAPIES SERIES
Discharge: HOME | End: 2019-01-16

## 2019-01-16 DIAGNOSIS — M76.32 ILIOTIBIAL BAND SYNDROME OF LEFT SIDE: ICD-10-CM

## 2019-01-16 DIAGNOSIS — M25.462 EFFUSION OF KNEE JOINT, LEFT: Primary | ICD-10-CM

## 2019-01-16 DIAGNOSIS — T14.8XXA CONTUSION OF BONE: ICD-10-CM

## 2019-01-16 DIAGNOSIS — M25.562 CHRONIC PAIN OF LEFT KNEE: ICD-10-CM

## 2019-01-16 DIAGNOSIS — M23.92 INTERNAL DERANGEMENT OF LEFT KNEE: ICD-10-CM

## 2019-01-16 DIAGNOSIS — G89.29 CHRONIC PAIN OF LEFT KNEE: ICD-10-CM

## 2019-01-16 PROCEDURE — 97161 PT EVAL LOW COMPLEX 20 MIN: CPT | Performed by: PHYSICAL THERAPIST
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
## 2019-01-22 ENCOUNTER — APPOINTMENT (OUTPATIENT)
Dept: PHYSICAL THERAPY | Facility: HOSPITAL | Age: 39
End: 2019-01-22

## 2019-01-25 ENCOUNTER — APPOINTMENT (OUTPATIENT)
Dept: PHYSICAL THERAPY | Facility: HOSPITAL | Age: 39
End: 2019-01-25

## 2019-01-29 ENCOUNTER — APPOINTMENT (OUTPATIENT)
Dept: PHYSICAL THERAPY | Facility: HOSPITAL | Age: 39
End: 2019-01-29

## 2019-01-31 ENCOUNTER — APPOINTMENT (OUTPATIENT)
Dept: PHYSICAL THERAPY | Facility: HOSPITAL | Age: 39
End: 2019-01-31

## 2019-02-04 ENCOUNTER — HOSPITAL ENCOUNTER (OUTPATIENT)
Dept: PHYSICAL THERAPY | Facility: HOSPITAL | Age: 39
Setting detail: THERAPIES SERIES
Discharge: HOME | End: 2019-02-04

## 2019-02-04 DIAGNOSIS — M25.462 EFFUSION OF KNEE JOINT, LEFT: Primary | ICD-10-CM

## 2019-02-04 DIAGNOSIS — G89.29 CHRONIC PAIN OF LEFT KNEE: ICD-10-CM

## 2019-02-04 DIAGNOSIS — M25.562 CHRONIC PAIN OF LEFT KNEE: ICD-10-CM

## 2019-02-04 DIAGNOSIS — T14.8XXA CONTUSION OF BONE: ICD-10-CM

## 2019-02-04 DIAGNOSIS — M76.32 ILIOTIBIAL BAND SYNDROME OF LEFT SIDE: ICD-10-CM

## 2019-02-04 PROCEDURE — G0283 ELEC STIM OTHER THAN WOUND: HCPCS

## 2019-02-04 PROCEDURE — 97110 THERAPEUTIC EXERCISES: CPT

## 2019-02-04 NOTE — THERAPY PROGRESS REPORT/RE-CERT
"    Outpatient Physical Therapy Ortho Progress Note  [facility]     Patient Name: Tete Chen  : 1980  MRN: [id number]  Today's Date: 2019      Visit Date: 2019  Visit:   Recert: 19  MD not scheduled   Sub Imp: \"Some\"    Patient Active Problem List   Diagnosis   • Tobacco dependence syndrome   • Obesity   • Depressive disorder   • Generalized anxiety disorder   • Essential hypertension   • Bilateral hand numbness   • Pain in both hands   • Numbness and tingling in both hands   • Acute pain of left knee   • Effusion of knee joint, left   • Chronic pain of left knee   • Internal derangement of left knee   • Iliotibial band syndrome of left side   • Contusion of bone        Past Medical History:   Diagnosis Date   • Acute maxillary sinusitis    • Adjustment disorder with anxiety    • Adjustment disorder with anxious mood    • Biliary calculus     post cholecystectomy      • Candidiasis    • Central abdominal pain    • Chronic pain    • Community acquired pneumonia    • Dental abscess    • Dental caries     o/e   • Depressive disorder      with some anxiety      • Depressive disorder     not elsewhere classified      • Essential hypertension    • Essential hypertension    • Frequent hospital admissions    • Gastroesophageal reflux disease    • Gastroesophageal reflux disease    • Generalized anxiety disorder    • Hyperlipidemia    • Hyperreflexia    • Influenza     needs immunization   • Irritable bowel syndrome    • Major depressive disorder    • Malaise and fatigue    • Multiple joint pain    • Nausea and vomiting    • Need for prophylactic vaccination against Streptococcus pneumoniae (pneumococcus)    • Obesity    • Rhonchi     low-pitched   • Seizures (CMS/HCC)    • Tobacco dependence syndrome    • Upper respiratory infection         Past Surgical History:   Procedure Laterality Date   • CHOLECYSTECTOMY      laparoscopic (With cholangiogram. Symptomatic gallstones.)   2015  "   • ENDOSCOPY      w/ biopsy [id number] (Gastritis found inthe body of the stomach. EGD with biopsy.)   07/21/2015    • ENDOSCOPY      w/ tube 49487 (Normal esophagus. Gastritis in stomach. Biopsy taken. Normal duodenum. Biopsy taken.)   10/19/2011    • HYSTEROSCOPY  07/09/2008   • TOTAL ABDOMINAL HYSTERECTOMY WITH SALPINGO OOPHORECTOMY  09/03/2008   • TUBAL ABDOMINAL LIGATION  08/21/2006   • VAGINAL DELIVERY      , P2, had pre-eclampsia with both       Visit Dx:     ICD-10-CM ICD-9-CM   1. Effusion of knee joint, left M25.462 719.06   2. Contusion of bone T14.8XXA 924.9   3. Iliotibial band syndrome of left side M76.32 728.89   4. Chronic pain of left knee M25.562 719.46    G89.29 338.29           PT Ortho     Row Name 02/04/19 1000       Subjective Comments    Subjective Comments  The pt states she has been doing fine since last visit. She states her knee is about the same. She states she is compliant w/ her HEP and she has been trying to bend the knee more. She consents to tx. The pt reports she has been absent from PT secondary to having transportation issues.   -MW       Subjective Pain    Able to rate subjective pain?  yes  -MW    Pre-Treatment Pain Level  3  -MW    Post-Treatment Pain Level  2  -MW       Posture/Observations    Posture/Observations Comments  The pt has TTP along bilat L knee joint line w/ medial > lateral. The pt has warmth to palpation on medial knee joint line w/ moderate medial swelling.   -MW       MMT (Manual Muscle Testing)    General MMT Comments  L knee flex/ext 4/5 w/ pain both directions, R knee flex/ext 5/5  -MW       Balance Skills Training    SLS  requires 1 UE assist  -MW       Gait/Stairs Assessment/Training    Comment (Gait/Stairs)  Pt has very antalgic gait w/ and w/o crutches. Pt ambulates w/ limited L LE WB and knee brace donned.   -MW      User Key  (r) = Recorded By, (t) = Taken By, (c) = Cosigned By    Initials Name Provider Type    Danitza Duarte, PT Physical Therapist                             PT OP Goals     Row Name 02/04/19 1000          PT Short Term Goals    STG Date to Achieve  02/25/19  -MW     STG 1  Patient be independent in home exercise program  -MW     STG 1 Progress  Ongoing  -MW     STG 2  Patient will have knee flexion 115°  -MW     STG 2 Progress  Progressing  -MW     STG 3  Patient will ambulate with 1 crutch or cane  -MW     STG 3 Progress  Progressing  -MW     STG 4  Patient will have decreased report of pain to 3 out of 10 on numerical analogue scale  -MW     STG 4 Progress  Met  -MW     STG 5  Patient will tolerate 5/5 manual muscle testing of quadriceps without pain  -MW     STG 5 Progress  Progressing  -MW     STG 6  Patient have an LEFS score of 30/80  -MW     STG 6 Progress  Met  -MW        Long Term Goals    LTG Date to Achieve  02/25/19  -MW     LTG 1  Patient will have LEFS score 40/80  -MW     LTG 2  Patient with subsequent to subjectively report 75% improvement or more  -MW     LTG 3  Patient be able to perform 10 straight leg raises with minimal fatigue  -MW     LTG 4  Patient ambulate with normalized gait pattern  -MW     LTG 5  Patient have decreased circumferential measurement at the popliteal crease to 41 cm  -MW        Time Calculation    PT Goal Re-Cert Due Date  02/25/19  -MW       User Key  (r) = Recorded By, (t) = Taken By, (c) = Cosigned By    Initials Name Provider Type    Danitza Duarte, PT Physical Therapist          PT Assessment/Plan     Row Name 02/04/19 1000          PT Assessment    Functional Limitations  Decreased safety during functional activities;Impaired gait;Impaired locomotion;Limitation in home management;Performance in self-care ADL  -MW     Impairments  Balance;Edema;Gait;Muscle strength;Pain;Range of motion  -MW     Assessment Comments  Despite being absent from PT for several sessions the pt made good progress w/ established rehab goals, meeting some of her STGs.  The pt's strength and pain have both  significantly improved. Pt is currently WBAT w/ use of 2 axillary crutches at start of tx. Pt attempted ambulation w/o crutches and demonstrated safe but antalgic gait w/o crutches. The pt continues to have significant strength, balance, and mobility deficits that are negatively contributing to her overall functional ability. She continues to need skilled PT to address the aforementioned deficits and to promote QOL. Will cont to progress the pt as tolerated.   -MW     Please refer to paper survey for additional self-reported information  Yes  -MW     Rehab Potential  Good  -MW     Patient/caregiver participated in establishment of treatment plan and goals  Yes  -MW     Patient would benefit from skilled therapy intervention  Yes  -MW        PT Plan    PT Frequency  2x/week  -MW     Predicted Duration of Therapy Intervention (Therapy Eval)  3-4 weeks  -MW     PT Plan Comments  Attempt gait training and decreasing crutch use next. Table quad exercises hip and core activities with progression to close kinetic chain exercises as able. Knee range of motion on Pro 2 bike.  Ultrasound  to the IT band and patellar region.  Manual therapy w/ IT band myofascial release PRN.  Ice w/ IFC PRN.   -MW       User Key  (r) = Recorded By, (t) = Taken By, (c) = Cosigned By    Initials Name Provider Type    Danitza Duarte, PT Physical Therapist          Modalities     Row Name 02/04/19 1000             Ice    Ice Applied  Yes  -MW      Location  L knee w/ IFC  -MW      Rx Minutes  10 mins  -MW      Ice S/P Rx  Yes  -MW         ELECTRICAL STIMULATION    Attended/Unattended  Unattended  -MW      Stimulation Type  IFC  -MW      Location/Electrode Placement/Other  L knee for 10 mins  -MW        User Key  (r) = Recorded By, (t) = Taken By, (c) = Cosigned By    Initials Name Provider Type    Danitza Duarte, PT Physical Therapist        Exercises     Row Name 02/04/19 1000             Subjective Comments    Subjective Comments  The  "pt states she has been doing fine since last visit. She states her knee is about the same. She states she is compliant w/ her HEP and she has been trying to bend the knee more. She consents to tx. The pt reports she has been absent from PT secondary to having transportation issues.   -MW         Subjective Pain    Able to rate subjective pain?  yes  -MW      Pre-Treatment Pain Level  3  -MW      Post-Treatment Pain Level  2  -MW         Exercise 1    Exercise Name 1  knee flex on PB  -MW      Sets 1  2  -MW      Reps 1  30  -MW         Exercise 2    Exercise Name 2  SLR  -MW      Sets 2  2  -MW      Reps 2  10  -MW         Exercise 3    Exercise Name 3  seated LAQ  -MW      Sets 3  1  -MW      Reps 3  10  -MW         Exercise 4    Exercise Name 4  TB knee flex  -MW      Sets 4  2  -MW      Reps 4  10  -MW      Additional Comments  green  -MW         Exercise 5    Exercise Name 5  TB knee ext  -MW      Sets 5  2  -MW      Reps 5  10  -MW      Additional Comments  green  -MW         Exercise 6    Exercise Name 6  SLS  -MW      Sets 6  3  -MW      Time 6  30\"  -MW      Additional Comments  added to HEP  -MW        User Key  (r) = Recorded By, (t) = Taken By, (c) = Cosigned By    Initials Name Provider Type    Danitza Duarte, PT Physical Therapist                           Lower Extremity Functional Index  Any of your usual work, housework or school activities: Quite a bit of difficulty  Your usual hobbies, recreational or sporting activities: Moderate difficulty  Getting into or out of the bath: Moderate difficulty  Walking between rooms: Moderate difficulty  Putting on your shoes or socks: A little bit of difficulty  Squatting: Quite a bit of difficulty  Lifting an object, like a bag of groceries from the floor: Quite a bit of difficulty  Performing light activities around your home: A little bit of difficulty  Performing heavy activities around your home: Quite a bit of difficulty  Getting into or out of a car: " Moderate difficulty  Walking 2 blocks: Quite a bit of difficulty  Walking a mile: Quite a bit of difficulty  Going up or down 10 stairs (about 1 flight of stairs): Quite a bit of difficulty  Standing for 1 hour: Quite a bit of difficulty  Sitting for 1 hour: A little bit of difficulty  Running on even ground: Quite a bit of difficulty  Running on uneven ground: Quite a bit of difficulty  Making sharp turns while running fast: Extreme difficulty or unable to perform activity  Hopping: Quite a bit of difficulty  Rolling over in bed: A little bit of difficulty  Total: 31      Time Calculation:         Start Time: 1030  Stop Time: 1110  Time Calculation (min): 40 min  PT Non-Billable Time (min): 5 min  Total Timed Code Minutes- PT: 25 minute(s)     Therapy Charges for Today     Code Description Service Date Service Provider Modifiers Qty    11962221731 HC PT THER SUPP EA 15 MIN 2/4/2019 Danitza Garcia, PT GP 1    74057964622 HC PT THER PROC EA 15 MIN 2/4/2019 Danitza Garcia, PT GP 2    66639638167 HC PT ELECTRICAL STIM UNATTENDED 2/4/2019 Danitza Garcia, PT  1                   Danitza Garcia PT  2/4/2019

## 2019-02-07 ENCOUNTER — APPOINTMENT (OUTPATIENT)
Dept: PHYSICAL THERAPY | Facility: HOSPITAL | Age: 39
End: 2019-02-07

## 2019-02-08 ENCOUNTER — HOSPITAL ENCOUNTER (OUTPATIENT)
Dept: PHYSICAL THERAPY | Facility: HOSPITAL | Age: 39
Setting detail: THERAPIES SERIES
Discharge: HOME | End: 2019-02-08

## 2019-02-08 DIAGNOSIS — M76.32 ILIOTIBIAL BAND SYNDROME OF LEFT SIDE: ICD-10-CM

## 2019-02-08 DIAGNOSIS — M23.92 INTERNAL DERANGEMENT OF LEFT KNEE: ICD-10-CM

## 2019-02-08 DIAGNOSIS — M25.462 EFFUSION OF KNEE JOINT, LEFT: Primary | ICD-10-CM

## 2019-02-08 DIAGNOSIS — M25.562 CHRONIC PAIN OF LEFT KNEE: ICD-10-CM

## 2019-02-08 DIAGNOSIS — G89.29 CHRONIC PAIN OF LEFT KNEE: ICD-10-CM

## 2019-02-08 DIAGNOSIS — T14.8XXA CONTUSION OF BONE: ICD-10-CM

## 2019-02-08 PROCEDURE — 97035 APP MDLTY 1+ULTRASOUND EA 15: CPT

## 2019-02-08 PROCEDURE — 97110 THERAPEUTIC EXERCISES: CPT

## 2019-02-08 NOTE — THERAPY TREATMENT NOTE
Outpatient Physical Therapy Ortho Treatment Note  [facility]     Patient Name: Tete Chen  : 1980  MRN: [id number]  Today's Date: 2019      Visit Date: 2019    Subjective improvement 0  Visits 3/7  Visits approved 8 from 2019 to 2019  RTMD not scheduled  Recert Date 2019    Left knee pain    Visit Dx:    ICD-10-CM ICD-9-CM   1. Effusion of knee joint, left M25.462 719.06   2. Contusion of bone T14.8XXA 924.9   3. Iliotibial band syndrome of left side M76.32 728.89   4. Chronic pain of left knee M25.562 719.46    G89.29 338.29   5. Internal derangement of left knee M23.92 717.9       Patient Active Problem List   Diagnosis   • Tobacco dependence syndrome   • Obesity   • Depressive disorder   • Generalized anxiety disorder   • Essential hypertension   • Bilateral hand numbness   • Pain in both hands   • Numbness and tingling in both hands   • Acute pain of left knee   • Effusion of knee joint, left   • Chronic pain of left knee   • Internal derangement of left knee   • Iliotibial band syndrome of left side   • Contusion of bone        Past Medical History:   Diagnosis Date   • Acute maxillary sinusitis    • Adjustment disorder with anxiety    • Adjustment disorder with anxious mood    • Biliary calculus     post cholecystectomy      • Candidiasis    • Central abdominal pain    • Chronic pain    • Community acquired pneumonia    • Dental abscess    • Dental caries     o/e   • Depressive disorder      with some anxiety      • Depressive disorder     not elsewhere classified      • Essential hypertension    • Essential hypertension    • Frequent hospital admissions    • Gastroesophageal reflux disease    • Gastroesophageal reflux disease    • Generalized anxiety disorder    • Hyperlipidemia    • Hyperreflexia    • Influenza     needs immunization   • Irritable bowel syndrome    • Major depressive disorder    • Malaise and fatigue    • Multiple joint pain    • Nausea and  vomiting    • Need for prophylactic vaccination against Streptococcus pneumoniae (pneumococcus)    • Obesity    • Rhonchi     low-pitched   • Seizures (CMS/HCC)    • Tobacco dependence syndrome    • Upper respiratory infection         Past Surgical History:   Procedure Laterality Date   • CHOLECYSTECTOMY      laparoscopic (With cholangiogram. Symptomatic gallstones.)   06/03/2015    • ENDOSCOPY      w/ biopsy 59888 (Gastritis found inthe body of the stomach. EGD with biopsy.)   07/21/2015    • ENDOSCOPY      w/ tube 69399 (Normal esophagus. Gastritis in stomach. Biopsy taken. Normal duodenum. Biopsy taken.)   10/19/2011    • HYSTEROSCOPY  07/09/2008   • TOTAL ABDOMINAL HYSTERECTOMY WITH SALPINGO OOPHORECTOMY  09/03/2008   • TUBAL ABDOMINAL LIGATION  08/21/2006   • VAGINAL DELIVERY      , P2, had pre-eclampsia with both       PT Ortho     Row Name 02/08/19 1100       Subjective Comments    Subjective Comments  Patient is walking a little without her crutches.  Once she gets up and gets going its not too bad.  -CP       Subjective Pain    Able to rate subjective pain?  yes  -CP    Pre-Treatment Pain Level  3  -CP       Posture/Observations    Posture/Observations Comments  amb with bilateral crutches and whearing knee brace  -CP      User Key  (r) = Recorded By, (t) = Taken By, (c) = Cosigned By    Initials Name Provider Type    CP Marilou Esposito, PTA Physical Therapy Assistant                      PT Assessment/Plan     Row Name 02/08/19 1202 02/08/19 1200       PT Assessment    Assessment Comments  Patient 10 minutes late for therapy  -CP  Patient TTP to lat and med knee.    -CP       PT Plan    PT Frequency  --  2x/week  -CP    Predicted Duration of Therapy Intervention (Therapy Eval)  --  3-4 weeks  -CP    PT Plan Comments  --  Cont with POC.  MFR to ITB PRN.  bridging, core stab  -CP      User Key  (r) = Recorded By, (t) = Taken By, (c) = Cosigned By    Initials Name Provider Type    Marilou Bergman, PTA  "Physical Therapy Assistant          Modalities     Row Name 02/08/19 1100             Ice    Ice Applied  Yes  -CP      Location  left knee  -CP      Rx Minutes  10 mins  -CP      Ice S/P Rx  Yes  -CP         Ultrasound 85884    Location  left lat knee  -CP      Duty Cycle  100  -CP      Frequency  3.0 MHz  -CP      Intensity - Wts/cm  1.5  -CP      64566 - PT Ultrasound Minutes  8  -CP        User Key  (r) = Recorded By, (t) = Taken By, (c) = Cosigned By    Initials Name Provider Type    CP Marilou Esposito PTA Physical Therapy Assistant          Exercises     Row Name 02/08/19 1100             Subjective Comments    Subjective Comments  Patient is walking a little without her crutches.  Once she gets up and gets going its not too bad.  -CP         Subjective Pain    Able to rate subjective pain?  yes  -CP      Pre-Treatment Pain Level  3  -CP      Post-Treatment Pain Level  0  -CP      Subjective Pain Comment  numb from ice  -CP         Exercise 1    Exercise Name 1  Pro II level 1  -CP      Time 1  10  -CP      Additional Comments  UE/LE  -CP         Exercise 2    Exercise Name 2  gait with one crutch in cliic  -CP      Reps 2  270 ft x 1  -CP         Exercise 3    Exercise Name 3  LAQ  -CP      Sets 3  2  -CP      Reps 3  10  -CP      Time 3  5\" hold  -CP         Exercise 4    Exercise Name 4  heelslides with strap  -CP      Reps 4  20  -CP         Exercise 5    Exercise Name 5  hooklying hip AB/AD  -CP      Sets 5  2  -CP      Reps 5  10  -CP         Exercise 6    Exercise Name 6  SLS  -CP      Sets 6  2  -CP      Reps 6  10  -CP         Exercise 7    Exercise Name 7  see modalities  -CP        User Key  (r) = Recorded By, (t) = Taken By, (c) = Cosigned By    Initials Name Provider Type    Marilou Bergman PTA Physical Therapy Assistant                         PT OP Goals     Row Name 02/08/19 1200          PT Short Term Goals    STG Date to Achieve  02/25/19  -CP     STG 1  Patient be independent in " home exercise program  -CP     STG 1 Progress  Ongoing  -CP     STG 2  Patient will have knee flexion 115°  -CP     STG 2 Progress  Progressing  -CP     STG 3  Patient will ambulate with 1 crutch or cane  -CP     STG 3 Progress  Progressing  -CP     STG 4  Patient will have decreased report of pain to 3 out of 10 on numerical analogue scale  -CP     STG 4 Progress  Met  -CP     STG 5  Patient will tolerate 5/5 manual muscle testing of quadriceps without pain  -CP     STG 5 Progress  Progressing  -CP     STG 6  Patient have an LEFS score of 30/80  -CP     STG 6 Progress  Met  -CP        Long Term Goals    LTG Date to Achieve  02/25/19  -CP     LTG 1  Patient will have LEFS score 40/80  -CP     LTG 2  Patient with subsequent to subjectively report 75% improvement or more  -CP     LTG 3  Patient be able to perform 10 straight leg raises with minimal fatigue  -CP     LTG 4  Patient ambulate with normalized gait pattern  -CP     LTG 5  Patient have decreased circumferential measurement at the popliteal crease to 41 cm  -CP        Time Calculation    PT Goal Re-Cert Due Date  02/25/19  -CP       User Key  (r) = Recorded By, (t) = Taken By, (c) = Cosigned By    Initials Name Provider Type    Marilou Bergman, PTA Physical Therapy Assistant                         Time Calculation:   Start Time: 1110  Stop Time: 1200  Time Calculation (min): 50 min  Total Timed Code Minutes- PT: 40 minute(s)  Therapy Suggested Charges     Code   Minutes Charges    60410 (CPT®) Hc Pt Neuromusc Re Education Ea 15 Min      39963 (CPT®) Hc Pt Ther Proc Ea 15 Min      14401 (CPT®) Hc Gait Training Ea 15 Min      49976 (CPT®) Hc Pt Therapeutic Act Ea 15 Min      21697 (CPT®) Hc Pt Manual Therapy Ea 15 Min      21047 (CPT®) Hc Pt Ther Massage- Per 15 Min      22588 (CPT®) Hc Pt Iontophoresis Ea 15 Min      33308 (CPT®) Hc Pt Elec Stim Ea-Per 15 Min      71415 (CPT®) Hc Pt Ultrasound Ea 15 Min 8 1    98431 (CPT®) Hc Pt Self Care/Mgmt/Train Ea  15 Min      95632 (CPT®) Hc Pt Prosthetic (S) Train Initial Encounter, Each 15 Min      55330 (CPT®) Hc Orthotic(S) Mgmt/Train Initial Encounter, Each 15min      04412 (CPT®) Hc Pt Aquatic Therapy Ea 15 Min      98938 (CPT®) Hc Pt Orthotic(S)/Prosthetic(S) Encounter, Each 15 Min       (CPT®) Hc Pt Electrical Stim Unattended      Total  8 1        Therapy Charges for Today     Code Description Service Date Service Provider Modifiers Qty    07379939215 HC PT THER SUPP EA 15 MIN 2/8/2019 Marilou Esposito, PTA GP 1    11461194680 HC PT ULTRASOUND EA 15 MIN 2/8/2019 Marilou Esposito, PTA GP 1    38259190842 HC PT THER PROC EA 15 MIN 2/8/2019 Marilou Esposito, PTA GP 2                    Marilou Esposito PTA  2/8/2019

## 2019-02-11 ENCOUNTER — APPOINTMENT (OUTPATIENT)
Dept: PHYSICAL THERAPY | Facility: HOSPITAL | Age: 39
End: 2019-02-11

## 2019-02-13 ENCOUNTER — APPOINTMENT (OUTPATIENT)
Dept: PHYSICAL THERAPY | Facility: HOSPITAL | Age: 39
End: 2019-02-13

## 2019-02-14 ENCOUNTER — APPOINTMENT (OUTPATIENT)
Dept: PHYSICAL THERAPY | Facility: HOSPITAL | Age: 39
End: 2019-02-14

## 2019-02-15 ENCOUNTER — APPOINTMENT (OUTPATIENT)
Dept: PHYSICAL THERAPY | Facility: HOSPITAL | Age: 39
End: 2019-02-15

## 2019-02-18 ENCOUNTER — HOSPITAL ENCOUNTER (OUTPATIENT)
Facility: HOSPITAL | Age: 39
Discharge: HOME | End: 2019-02-21
Attending: FAMILY MEDICINE | Admitting: HOSPITALIST

## 2019-02-18 ENCOUNTER — APPOINTMENT (OUTPATIENT)
Dept: CT IMAGING | Facility: HOSPITAL | Age: 39
End: 2019-02-18

## 2019-02-18 DIAGNOSIS — K52.9 COLITIS: Primary | ICD-10-CM

## 2019-02-18 LAB
ALBUMIN SERPL-MCNC: 5 G/DL (ref 3.4–4.8)
ALBUMIN/GLOB SERPL: 1.4 G/DL (ref 1.1–1.8)
ALP SERPL-CCNC: 110 U/L (ref 38–126)
ALT SERPL W P-5'-P-CCNC: 25 U/L (ref 9–52)
ANION GAP SERPL CALCULATED.3IONS-SCNC: 11 MMOL/L (ref 5–15)
AST SERPL-CCNC: 21 U/L (ref 14–36)
BASOPHILS # BLD AUTO: 0.06 10*3/MM3 (ref 0–0.2)
BASOPHILS NFR BLD AUTO: 0.3 % (ref 0–1.5)
BILIRUB SERPL-MCNC: 0.6 MG/DL (ref 0.2–1.3)
BILIRUB UR QL STRIP: ABNORMAL
BUN BLD-MCNC: 16 MG/DL (ref 7–21)
BUN/CREAT SERPL: 16 (ref 7–25)
CALCIUM SPEC-SCNC: 10 MG/DL (ref 8.4–10.2)
CHLORIDE SERPL-SCNC: 99 MMOL/L (ref 95–110)
CLARITY UR: ABNORMAL
CO2 SERPL-SCNC: 28 MMOL/L (ref 22–31)
COLOR UR: YELLOW
CREAT BLD-MCNC: 1 MG/DL (ref 0.5–1)
D-LACTATE SERPL-SCNC: 2.6 MMOL/L (ref 0.5–2)
D-LACTATE SERPL-SCNC: 3.5 MMOL/L (ref 0.5–2)
DEPRECATED RDW RBC AUTO: 44.7 FL (ref 37–54)
EOSINOPHIL # BLD AUTO: 0.15 10*3/MM3 (ref 0–0.4)
EOSINOPHIL # BLD MANUAL: 0.55 10*3/MM3 (ref 0–0.4)
EOSINOPHIL NFR BLD AUTO: 0.8 % (ref 0.3–6.2)
EOSINOPHIL NFR BLD MANUAL: 3 % (ref 0.3–6.2)
ERYTHROCYTE [DISTWIDTH] IN BLOOD BY AUTOMATED COUNT: 14.9 % (ref 12.3–15.4)
GFR SERPL CREATININE-BSD FRML MDRD: 62 ML/MIN/1.73 (ref 64–149)
GLOBULIN UR ELPH-MCNC: 3.6 GM/DL (ref 2.3–3.5)
GLUCOSE BLD-MCNC: 110 MG/DL (ref 60–100)
GLUCOSE UR STRIP-MCNC: NEGATIVE MG/DL
HCT VFR BLD AUTO: 59.4 % (ref 34–46.6)
HGB BLD-MCNC: 19.7 G/DL (ref 12–15.9)
HGB UR QL STRIP.AUTO: NEGATIVE
HOLD SPECIMEN: NORMAL
IMM GRANULOCYTES # BLD AUTO: 0.21 10*3/MM3 (ref 0–0.05)
IMM GRANULOCYTES NFR BLD AUTO: 1.1 % (ref 0–0.5)
KETONES UR QL STRIP: NEGATIVE
LEUKOCYTE ESTERASE UR QL STRIP.AUTO: NEGATIVE
LIPASE SERPL-CCNC: 89 U/L (ref 23–300)
LYMPHOCYTES # BLD AUTO: 2.65 10*3/MM3 (ref 0.7–3.1)
LYMPHOCYTES # BLD MANUAL: 2.58 10*3/MM3 (ref 0.7–3.1)
LYMPHOCYTES NFR BLD AUTO: 14.4 % (ref 19.6–45.3)
LYMPHOCYTES NFR BLD MANUAL: 14 % (ref 19.6–45.3)
LYMPHOCYTES NFR BLD MANUAL: 5 % (ref 5–12)
MCH RBC QN AUTO: 29.5 PG (ref 26.6–33)
MCHC RBC AUTO-ENTMCNC: 33.2 G/DL (ref 31.5–35.7)
MCV RBC AUTO: 89.1 FL (ref 79–97)
MONOCYTES # BLD AUTO: 0.92 10*3/MM3 (ref 0.1–0.9)
MONOCYTES # BLD AUTO: 1.12 10*3/MM3 (ref 0.1–0.9)
MONOCYTES NFR BLD AUTO: 6.1 % (ref 5–12)
NEUTROPHILS # BLD AUTO: 14.21 10*3/MM3 (ref 1.4–7)
NEUTROPHILS # BLD AUTO: 14.27 10*3/MM3 (ref 1.4–7)
NEUTROPHILS NFR BLD AUTO: 77.3 % (ref 42.7–76)
NEUTROPHILS NFR BLD MANUAL: 73 % (ref 42.7–76)
NEUTS BAND NFR BLD MANUAL: 4 % (ref 0–5)
NITRITE UR QL STRIP: NEGATIVE
NRBC BLD AUTO-RTO: 0 /100 WBC (ref 0–0)
PH UR STRIP.AUTO: 5.5 [PH] (ref 5–9)
PLAT MORPH BLD: NORMAL
PLATELET # BLD AUTO: 377 10*3/MM3 (ref 140–450)
PMV BLD AUTO: 9.3 FL (ref 6–12)
POTASSIUM BLD-SCNC: 4 MMOL/L (ref 3.5–5.1)
PROT SERPL-MCNC: 8.6 G/DL (ref 6.3–8.6)
PROT UR QL STRIP: ABNORMAL
RBC # BLD AUTO: 6.67 10*6/MM3 (ref 3.77–5.28)
RBC MORPH BLD: NORMAL
SODIUM BLD-SCNC: 138 MMOL/L (ref 137–145)
SP GR UR STRIP: 1.03 (ref 1–1.03)
UROBILINOGEN UR QL STRIP: ABNORMAL
VARIANT LYMPHS NFR BLD MANUAL: 1 % (ref 0–5)
WBC MORPH BLD: NORMAL
WBC NRBC COR # BLD: 18.46 10*3/MM3 (ref 3.4–10.8)
WHOLE BLOOD HOLD SPECIMEN: NORMAL

## 2019-02-18 PROCEDURE — 36415 COLL VENOUS BLD VENIPUNCTURE: CPT | Performed by: PHYSICIAN ASSISTANT

## 2019-02-18 PROCEDURE — 25010000002 MORPHINE PER 10 MG: Performed by: FAMILY MEDICINE

## 2019-02-18 PROCEDURE — 25010000002 ONDANSETRON PER 1 MG: Performed by: PHYSICIAN ASSISTANT

## 2019-02-18 PROCEDURE — 96368 THER/DIAG CONCURRENT INF: CPT

## 2019-02-18 PROCEDURE — 85025 COMPLETE CBC W/AUTO DIFF WBC: CPT | Performed by: INTERNAL MEDICINE

## 2019-02-18 PROCEDURE — 96365 THER/PROPH/DIAG IV INF INIT: CPT

## 2019-02-18 PROCEDURE — 83605 ASSAY OF LACTIC ACID: CPT | Performed by: FAMILY MEDICINE

## 2019-02-18 PROCEDURE — 96372 THER/PROPH/DIAG INJ SC/IM: CPT

## 2019-02-18 PROCEDURE — G0378 HOSPITAL OBSERVATION PER HR: HCPCS

## 2019-02-18 PROCEDURE — 83690 ASSAY OF LIPASE: CPT | Performed by: PHYSICIAN ASSISTANT

## 2019-02-18 PROCEDURE — 87040 BLOOD CULTURE FOR BACTERIA: CPT | Performed by: FAMILY MEDICINE

## 2019-02-18 PROCEDURE — 96367 TX/PROPH/DG ADDL SEQ IV INF: CPT

## 2019-02-18 PROCEDURE — 25010000002 ONDANSETRON PER 1 MG: Performed by: INTERNAL MEDICINE

## 2019-02-18 PROCEDURE — 96366 THER/PROPH/DIAG IV INF ADDON: CPT

## 2019-02-18 PROCEDURE — 96375 TX/PRO/DX INJ NEW DRUG ADDON: CPT

## 2019-02-18 PROCEDURE — 25010000002 IOPAMIDOL 61 % SOLUTION: Performed by: FAMILY MEDICINE

## 2019-02-18 PROCEDURE — 25010000002 VANCOMYCIN: Performed by: PHYSICIAN ASSISTANT

## 2019-02-18 PROCEDURE — 25010000002 MORPHINE PER 10 MG: Performed by: INTERNAL MEDICINE

## 2019-02-18 PROCEDURE — 80053 COMPREHEN METABOLIC PANEL: CPT | Performed by: PHYSICIAN ASSISTANT

## 2019-02-18 PROCEDURE — 99285 EMERGENCY DEPT VISIT HI MDM: CPT

## 2019-02-18 PROCEDURE — 85025 COMPLETE CBC W/AUTO DIFF WBC: CPT | Performed by: PHYSICIAN ASSISTANT

## 2019-02-18 PROCEDURE — 96376 TX/PRO/DX INJ SAME DRUG ADON: CPT

## 2019-02-18 PROCEDURE — 25010000002 LEVOFLOXACIN PER 250 MG: Performed by: INTERNAL MEDICINE

## 2019-02-18 PROCEDURE — 81003 URINALYSIS AUTO W/O SCOPE: CPT | Performed by: PHYSICIAN ASSISTANT

## 2019-02-18 PROCEDURE — 25010000002 HEPARIN (PORCINE) PER 1000 UNITS: Performed by: INTERNAL MEDICINE

## 2019-02-18 PROCEDURE — 74177 CT ABD & PELVIS W/CONTRAST: CPT

## 2019-02-18 PROCEDURE — 25010000002 DIPHENHYDRAMINE PER 50 MG: Performed by: INTERNAL MEDICINE

## 2019-02-18 PROCEDURE — 25010000002 METHYLPREDNISOLONE PER 125 MG: Performed by: INTERNAL MEDICINE

## 2019-02-18 PROCEDURE — 85007 BL SMEAR W/DIFF WBC COUNT: CPT | Performed by: PHYSICIAN ASSISTANT

## 2019-02-18 RX ORDER — KETOROLAC TROMETHAMINE 30 MG/ML
30 INJECTION, SOLUTION INTRAMUSCULAR; INTRAVENOUS EVERY 6 HOURS PRN
Status: DISCONTINUED | OUTPATIENT
Start: 2019-02-18 | End: 2019-02-21 | Stop reason: HOSPADM

## 2019-02-18 RX ORDER — MORPHINE SULFATE 2 MG/ML
2 INJECTION, SOLUTION INTRAMUSCULAR; INTRAVENOUS
Status: DISCONTINUED | OUTPATIENT
Start: 2019-02-18 | End: 2019-02-21 | Stop reason: HOSPADM

## 2019-02-18 RX ORDER — ACETAMINOPHEN 325 MG/1
650 TABLET ORAL EVERY 4 HOURS PRN
Status: DISCONTINUED | OUTPATIENT
Start: 2019-02-18 | End: 2019-02-21 | Stop reason: HOSPADM

## 2019-02-18 RX ORDER — SENNA AND DOCUSATE SODIUM 50; 8.6 MG/1; MG/1
2 TABLET, FILM COATED ORAL 2 TIMES DAILY PRN
Status: DISCONTINUED | OUTPATIENT
Start: 2019-02-18 | End: 2019-02-21 | Stop reason: HOSPADM

## 2019-02-18 RX ORDER — HEPARIN SODIUM 5000 [USP'U]/ML
5000 INJECTION, SOLUTION INTRAVENOUS; SUBCUTANEOUS EVERY 8 HOURS SCHEDULED
Status: DISCONTINUED | OUTPATIENT
Start: 2019-02-18 | End: 2019-02-21 | Stop reason: HOSPADM

## 2019-02-18 RX ORDER — HYDROCHLOROTHIAZIDE 25 MG/1
25 TABLET ORAL DAILY
Status: DISCONTINUED | OUTPATIENT
Start: 2019-02-18 | End: 2019-02-21 | Stop reason: HOSPADM

## 2019-02-18 RX ORDER — SODIUM CHLORIDE 9 MG/ML
125 INJECTION, SOLUTION INTRAVENOUS CONTINUOUS
Status: DISCONTINUED | OUTPATIENT
Start: 2019-02-18 | End: 2019-02-21 | Stop reason: HOSPADM

## 2019-02-18 RX ORDER — CALCIUM CARBONATE 200(500)MG
2 TABLET,CHEWABLE ORAL 2 TIMES DAILY PRN
Status: DISCONTINUED | OUTPATIENT
Start: 2019-02-18 | End: 2019-02-21 | Stop reason: HOSPADM

## 2019-02-18 RX ORDER — DIPHENHYDRAMINE HYDROCHLORIDE 50 MG/ML
50 INJECTION INTRAMUSCULAR; INTRAVENOUS ONCE
Status: COMPLETED | OUTPATIENT
Start: 2019-02-18 | End: 2019-02-18

## 2019-02-18 RX ORDER — DICYCLOMINE HYDROCHLORIDE 10 MG/1
10 CAPSULE ORAL 3 TIMES DAILY PRN
Status: DISCONTINUED | OUTPATIENT
Start: 2019-02-18 | End: 2019-02-21 | Stop reason: HOSPADM

## 2019-02-18 RX ORDER — FAMOTIDINE 10 MG/ML
20 INJECTION, SOLUTION INTRAVENOUS EVERY 12 HOURS SCHEDULED
Status: DISCONTINUED | OUTPATIENT
Start: 2019-02-18 | End: 2019-02-21 | Stop reason: HOSPADM

## 2019-02-18 RX ORDER — ALUMINA, MAGNESIA, AND SIMETHICONE 2400; 2400; 240 MG/30ML; MG/30ML; MG/30ML
15 SUSPENSION ORAL ONCE
Status: COMPLETED | OUTPATIENT
Start: 2019-02-18 | End: 2019-02-18

## 2019-02-18 RX ORDER — ONDANSETRON 2 MG/ML
4 INJECTION INTRAMUSCULAR; INTRAVENOUS EVERY 6 HOURS PRN
Status: DISCONTINUED | OUTPATIENT
Start: 2019-02-18 | End: 2019-02-21 | Stop reason: HOSPADM

## 2019-02-18 RX ORDER — METHYLPREDNISOLONE SODIUM SUCCINATE 125 MG/2ML
125 INJECTION, POWDER, LYOPHILIZED, FOR SOLUTION INTRAMUSCULAR; INTRAVENOUS ONCE
Status: COMPLETED | OUTPATIENT
Start: 2019-02-18 | End: 2019-02-18

## 2019-02-18 RX ORDER — SODIUM CHLORIDE 0.9 % (FLUSH) 0.9 %
3-10 SYRINGE (ML) INJECTION AS NEEDED
Status: DISCONTINUED | OUTPATIENT
Start: 2019-02-18 | End: 2019-02-21 | Stop reason: HOSPADM

## 2019-02-18 RX ORDER — SODIUM CHLORIDE 0.9 % (FLUSH) 0.9 %
3 SYRINGE (ML) INJECTION EVERY 12 HOURS SCHEDULED
Status: DISCONTINUED | OUTPATIENT
Start: 2019-02-18 | End: 2019-02-21 | Stop reason: HOSPADM

## 2019-02-18 RX ORDER — LEVOFLOXACIN 5 MG/ML
500 INJECTION, SOLUTION INTRAVENOUS EVERY 24 HOURS
Status: DISCONTINUED | OUTPATIENT
Start: 2019-02-18 | End: 2019-02-21 | Stop reason: HOSPADM

## 2019-02-18 RX ORDER — ONDANSETRON 2 MG/ML
4 INJECTION INTRAMUSCULAR; INTRAVENOUS ONCE
Status: COMPLETED | OUTPATIENT
Start: 2019-02-18 | End: 2019-02-18

## 2019-02-18 RX ORDER — LISINOPRIL 40 MG/1
40 TABLET ORAL DAILY
Status: DISCONTINUED | OUTPATIENT
Start: 2019-02-18 | End: 2019-02-21 | Stop reason: HOSPADM

## 2019-02-18 RX ORDER — ONDANSETRON 4 MG/1
4 TABLET, ORALLY DISINTEGRATING ORAL EVERY 6 HOURS PRN
Status: DISCONTINUED | OUTPATIENT
Start: 2019-02-18 | End: 2019-02-21 | Stop reason: HOSPADM

## 2019-02-18 RX ORDER — LEVETIRACETAM 500 MG/1
500 TABLET ORAL EVERY 12 HOURS SCHEDULED
Status: DISCONTINUED | OUTPATIENT
Start: 2019-02-18 | End: 2019-02-21 | Stop reason: HOSPADM

## 2019-02-18 RX ORDER — GABAPENTIN 400 MG/1
800 CAPSULE ORAL EVERY 8 HOURS SCHEDULED
Status: DISCONTINUED | OUTPATIENT
Start: 2019-02-18 | End: 2019-02-21 | Stop reason: HOSPADM

## 2019-02-18 RX ORDER — ALBUTEROL SULFATE 2.5 MG/3ML
2.5 SOLUTION RESPIRATORY (INHALATION) EVERY 6 HOURS PRN
Status: DISCONTINUED | OUTPATIENT
Start: 2019-02-18 | End: 2019-02-21 | Stop reason: HOSPADM

## 2019-02-18 RX ORDER — BUPRENORPHINE HYDROCHLORIDE AND NALOXONE HYDROCHLORIDE DIHYDRATE 8; 2 MG/1; MG/1
1 TABLET SUBLINGUAL DAILY
Status: DISCONTINUED | OUTPATIENT
Start: 2019-02-18 | End: 2019-02-20

## 2019-02-18 RX ORDER — ONDANSETRON 4 MG/1
4 TABLET, FILM COATED ORAL EVERY 6 HOURS PRN
Status: DISCONTINUED | OUTPATIENT
Start: 2019-02-18 | End: 2019-02-21 | Stop reason: HOSPADM

## 2019-02-18 RX ADMIN — ALUMINUM HYDROXIDE, MAGNESIUM HYDROXIDE, AND DIMETHICONE 15 ML: 400; 400; 40 SUSPENSION ORAL at 12:33

## 2019-02-18 RX ADMIN — SODIUM CHLORIDE 100 ML/HR: 9 INJECTION, SOLUTION INTRAVENOUS at 18:31

## 2019-02-18 RX ADMIN — ONDANSETRON 4 MG: 2 INJECTION INTRAMUSCULAR; INTRAVENOUS at 20:01

## 2019-02-18 RX ADMIN — DIPHENHYDRAMINE HYDROCHLORIDE 50 MG: 50 INJECTION INTRAMUSCULAR; INTRAVENOUS at 18:31

## 2019-02-18 RX ADMIN — LIDOCAINE HYDROCHLORIDE 15 ML: 20 SOLUTION ORAL; TOPICAL at 12:33

## 2019-02-18 RX ADMIN — METRONIDAZOLE 500 MG: 500 INJECTION, SOLUTION INTRAVENOUS at 19:40

## 2019-02-18 RX ADMIN — SODIUM CHLORIDE 1000 ML: 900 INJECTION, SOLUTION INTRAVENOUS at 12:31

## 2019-02-18 RX ADMIN — MORPHINE SULFATE 4 MG: 4 INJECTION INTRAVENOUS at 14:36

## 2019-02-18 RX ADMIN — IOPAMIDOL 90 ML: 612 INJECTION, SOLUTION INTRAVENOUS at 15:07

## 2019-02-18 RX ADMIN — LEVETIRACETAM 500 MG: 500 TABLET, FILM COATED ORAL at 21:44

## 2019-02-18 RX ADMIN — METHYLPREDNISOLONE SODIUM SUCCINATE 125 MG: 125 INJECTION, POWDER, FOR SOLUTION INTRAMUSCULAR; INTRAVENOUS at 18:31

## 2019-02-18 RX ADMIN — ONDANSETRON 4 MG: 2 INJECTION INTRAMUSCULAR; INTRAVENOUS at 12:32

## 2019-02-18 RX ADMIN — SODIUM CHLORIDE, PRESERVATIVE FREE 3 ML: 5 INJECTION INTRAVENOUS at 21:44

## 2019-02-18 RX ADMIN — GABAPENTIN 800 MG: 400 CAPSULE ORAL at 21:43

## 2019-02-18 RX ADMIN — LEVOFLOXACIN 500 MG: 5 INJECTION, SOLUTION INTRAVENOUS at 21:43

## 2019-02-18 RX ADMIN — VANCOMYCIN HYDROCHLORIDE 1750 MG: 1 INJECTION, POWDER, LYOPHILIZED, FOR SOLUTION INTRAVENOUS at 14:37

## 2019-02-18 RX ADMIN — BUPRENORPHINE HYDROCHLORIDE AND NALOXONE HYDROCHLORIDE DIHYDRATE 1 TABLET: 8; 2 TABLET SUBLINGUAL at 18:31

## 2019-02-18 RX ADMIN — AZTREONAM 2 G: 2 INJECTION, POWDER, FOR SOLUTION INTRAMUSCULAR; INTRAVENOUS at 14:37

## 2019-02-18 RX ADMIN — MORPHINE SULFATE 2 MG: 2 INJECTION, SOLUTION INTRAMUSCULAR; INTRAVENOUS at 19:37

## 2019-02-18 RX ADMIN — FAMOTIDINE 20 MG: 10 INJECTION INTRAVENOUS at 21:50

## 2019-02-18 RX ADMIN — HEPARIN SODIUM 5000 UNITS: 5000 INJECTION INTRAVENOUS; SUBCUTANEOUS at 21:45

## 2019-02-18 NOTE — H&P
[facility] Medicine Admission      Date of Admission: 2/18/2019      Primary Care Physician: Alfred Stahl APRN      Chief Complaint: abdominal pain, nausea and vomiting     HPI:    38 year old female patient who came to the ED due to worsening abdominal pain, associated with diarrhea, nausea and vomiting since yesterday. As per patient she is being treated for bronchitis with omnicef, one more day to go. Her bronchitis is better but yesterday she developed a severe abdominal pain, this was follow by N/V and one episode of diarrhea. She was seen by her PCP, bentyl was given but this morning she could not hold the pain anymore, then came to the ED for further evaluation.  She described the pain as severe, generalized, on and off, 7/10 in severity , non radiated, not aggravating or exacerbating factors. Vomiting are non bilious non bloody. Diarrhea  Is watery and foul smelling.      At the ED labs reported lactic acidosis, and leukocytosis. CT abdomen reported possible liver hemangioma, Small amount of free fluid tracking around the liver. Small to moderate amount of free fluid tracking around the spleen and into  left paracolic gutter and Ascending colon mild edematous wall thickening and surrounding mild fatty stranding.           Concurrent Medical History:   Past Medical History:   Diagnosis Date   • Acute maxillary sinusitis    • Adjustment disorder with anxiety    • Adjustment disorder with anxious mood    • Biliary calculus     post cholecystectomy      • Candidiasis    • Central abdominal pain    • Chronic pain    • Community acquired pneumonia    • Dental abscess    • Dental caries     o/e   • Depressive disorder      with some anxiety      • Depressive disorder     not elsewhere classified      • Essential hypertension    • Essential hypertension    • Frequent hospital admissions    • Gastroesophageal reflux disease    • Gastroesophageal reflux disease    •  Generalized anxiety disorder    • Hyperlipidemia    • Hyperreflexia    • Influenza     needs immunization   • Irritable bowel syndrome    • Major depressive disorder    • Malaise and fatigue    • Multiple joint pain    • Nausea and vomiting    • Need for prophylactic vaccination against Streptococcus pneumoniae (pneumococcus)    • Obesity    • Rhonchi     low-pitched   • Seizures (CMS/HCC)    • Tobacco dependence syndrome    • Upper respiratory infection          Past Surgical History:   Past Surgical History:   Procedure Laterality Date   • CHOLECYSTECTOMY      laparoscopic (With cholangiogram. Symptomatic gallstones.)   06/03/2015    • ENDOSCOPY      w/ biopsy [id number] (Gastritis found inthe body of the stomach. EGD with biopsy.)   07/21/2015    • ENDOSCOPY      w/ tube 96128 (Normal esophagus. Gastritis in stomach. Biopsy taken. Normal duodenum. Biopsy taken.)   10/19/2011    • HYSTEROSCOPY  07/09/2008   • TOTAL ABDOMINAL HYSTERECTOMY WITH SALPINGO OOPHORECTOMY  09/03/2008   • TUBAL ABDOMINAL LIGATION  08/21/2006   • VAGINAL DELIVERY      , P2, had pre-eclampsia with both       Family History: family history includes Diabetes in her other; Hypertension in her other; Other in her other and other.     Social History:  reports that she has been smoking electronic cigarette and cigarettes.  She has been smoking about 0.25 packs per day. she has never used smokeless tobacco. She reports that she does not drink alcohol or use drugs.    Allergies:   Allergies   Allergen Reactions   • Ceclor [Cefaclor]    • Other      darvocet-N 100   • Reglan [Metoclopramide]    • Valtrex [Valacyclovir Hcl]    • Zithromax [Azithromycin]      z-iam       Medications:   No current facility-administered medications on file prior to encounter.      Current Outpatient Medications on File Prior to Encounter   Medication Sig Dispense Refill   • acetaminophen (TYLENOL) 325 MG tablet Take 650 mg by mouth every 4 (four) hours as needed for mild pain  (1-3).     • albuterol (PROVENTIL) (2.5 MG/3ML) 0.083% nebulizer solution      • ALPRAZolam (XANAX) 1 MG tablet Take 1 mg by mouth.     • buprenorphine-naloxone (SUBOXONE) 8-2 MG per SL tablet Place 1 tablet under the tongue Daily.     • cefdinir (OMNICEF) 300 MG capsule TAKE ONE CAPSULE BY MOUTH TWICE DAILY FOR SEVEN DAYS  0   • diclofenac (VOLTAREN) 75 MG EC tablet TAKE 1 TABLET BY MOUTH EVERY 12 (TWELVE) HOURS AS NEEDED (PAIN) FOR UP TO 30 DAYS. 60 tablet 1   • dicyclomine (BENTYL) 10 MG capsule Take 1 capsule by mouth 3 (Three) Times a Day As Needed (abdominal cramping). 21 capsule 0   • gabapentin (NEURONTIN) 800 MG tablet Take 1 tablet by mouth 3 (Three) Times a Day. 90 tablet 1   • hydrochlorothiazide (HYDRODIURIL) 25 MG tablet Take 25 mg by mouth Daily.     • levETIRAcetam (KEPPRA) 500 MG tablet Take 1 tablet by mouth 2 (Two) Times a Day. 60 tablet 1   • lisinopril (PRINIVIL,ZESTRIL) 40 MG tablet Take 40 mg by mouth Daily.     • MethylPREDNISolone (MEDROL, SELINA,) 4 MG tablet see package  0   • TRINTELLIX 10 MG tablet Take 1 tablet by mouth Daily.  5   • VENTOLIN  (90 Base) MCG/ACT inhaler      • [DISCONTINUED] omeprazole (PriLOSEC) 40 MG capsule Take 40 mg by mouth daily.           Review of Systems:  Review of Systems all 12 point were reviewed and they were negative, except for nausea , vomiting, diarrhea and abdominal pain       Physical Exam:   Temp:  [97.6 °F (36.4 °C)-98.6 °F (37 °C)] 98.6 °F (37 °C)  Heart Rate:  [] 97  Resp:  [16-20] 16  BP: (104-131)/() 104/78  Physical Exam  GENERAL APPEARANCE: Well developed, well nourished, alert and cooperative, , not acute distress. Morbid obese   HEENT: normocephalic. PERRL, EOMI, vision is grossly intact.: External auditory canals and tympanic membranes clear, hearing grossly intact. No nasal discharge. Oral cavity and pharynx normal. No inflammation, swelling, exudate, or lesions. Teeth and gingiva in good general condition.  NECK: Neck  supple, non-tender without lymphadenopathy, masses or thyromegaly.  CARDIAC: Normal S1 and S2. RRR, not M/R/G.   LUNGS: Clear to auscultation and percussion without rales, rhonchi, wheezing or diminished breath sounds.  ABDOMEN: Positive bowel sounds. Soft, nondistended, diffusely tender. No guarding or rebound. No masses.  MUSKULOSKELETAL: ROM intact spine and extremities. No joint erythema or tenderness. Normal muscular development. Normal gait.  BACK: Examination of the spine reveals normal gait and posture, no spinal deformity, symmetry of spinal muscles, without tenderness, decreased range of motion or muscular spasm.  EXTREMITIES: No significant deformity or joint abnormality. No edema. Peripheral pulses intact. No varicosities.  NEUROLOGICAL: CN II-XII intact. Strength and sensation symmetric and intact throughout. Reflexes 2+ throughout. Cerebellar testing normal.  SKIN: Skin normal color, texture and turgor with no lesions or eruptions.  PSYCHIATRIC: oriented x 3 .  good judgement and reason, without hallucinations, abnormal affect or abnormal behaviors during the examination.  not suicidal.      Results Reviewed:  I have personally reviewed current lab, radiology, and data and agree with results.  Lab Results (last 24 hours)     Procedure Component Value Units Date/Time    CBC & Differential [[id number]] Collected:  02/18/19 1231    Specimen:  Blood Updated:  02/18/19 1724    Narrative:       The following orders were created for panel order CBC & Differential.  Procedure                               Abnormality         Status                     ---------                               -----------         ------                     Manual Differential[[id number]]          Abnormal            Final result               Scan Slide[[id number]]                                                                  CBC Auto Differential[[id number]]        Abnormal            Final result                 Please view  results for these tests on the individual orders.    Manual Differential [[id number]]  (Abnormal) Collected:  02/18/19 1231    Specimen:  Blood Updated:  02/18/19 1724     Neutrophil % 73.0 %      Lymphocyte % 14.0 %      Monocyte % 5.0 %      Eosinophil % 3.0 %      Bands %  4.0 %      Atypical Lymphocyte % 1.0 %      Neutrophils Absolute 14.21 10*3/mm3      Lymphocytes Absolute 2.58 10*3/mm3      Monocytes Absolute 0.92 10*3/mm3      Eosinophils Absolute 0.55 10*3/mm3      RBC Morphology Normal     WBC Morphology Normal     Platelet Morphology Normal    [place] Draw [[id number]] Collected:  02/18/19 1231    Specimen:  Blood Updated:  02/18/19 1500    Narrative:       The following orders were created for panel order [place] Draw.  Procedure                               Abnormality         Status                     ---------                               -----------         ------                     Light Blue Top[[id number]]                                   Final result               Green Top (Gel)[[id number]]                                  Final result               Lavender Top[[id number]]                                     Final result               Gold Top - SST[[id number]]                                   Final result                 Please view results for these tests on the individual orders.    Light Blue Top [[id number]] Collected:  02/18/19 1350    Specimen:  Blood Updated:  02/18/19 1500     Extra Tube hold for add-on     Comment: Auto resulted       Green Top (Gel) [[id number]] Collected:  02/18/19 1350    Specimen:  Blood Updated:  02/18/19 1500     Extra Tube Hold for add-ons.     Comment: Auto resulted.       Gold Top - SST [[id number]] Collected:  02/18/19 1350    Specimen:  Blood Updated:  02/18/19 1500     Extra Tube Hold for add-ons.     Comment: Auto resulted.       Extra Tubes [[id number]] Collected:  02/18/19 1350    Specimen:  Blood, Venous Line Updated:  02/18/19 1500    Narrative:        The following orders were created for panel order Extra Tubes.  Procedure                               Abnormality         Status                     ---------                               -----------         ------                     Lavender Top[[id number]]                                     Final result                 Please view results for these tests on the individual orders.    Lavender Top [[id number]] Collected:  02/18/19 1350    Specimen:  Blood Updated:  02/18/19 1500     Extra Tube hold for add-on     Comment: Auto resulted       Lactic Acid, Plasma [[id number]]  (Abnormal) Collected:  02/18/19 1350    Specimen:  Blood Updated:  02/18/19 1434     Lactate 2.6 mmol/L     Lactic Acid, Reflex Timer (This will reflex a repeat order 3-3:15 hours after ordered.) [[id number]] Collected:  02/18/19 1350    Specimen:  Blood Updated:  02/18/19 1434    Comprehensive Metabolic Panel [[id number]]  (Abnormal) Collected:  02/18/19 1350    Specimen:  Blood Updated:  02/18/19 1427     Glucose 110 mg/dL      BUN 16 mg/dL      Creatinine 1.00 mg/dL      Sodium 138 mmol/L      Potassium 4.0 mmol/L      Chloride 99 mmol/L      CO2 28.0 mmol/L      Calcium 10.0 mg/dL      Total Protein 8.6 g/dL      Albumin 5.00 g/dL      ALT (SGPT) 25 U/L      AST (SGOT) 21 U/L      Alkaline Phosphatase 110 U/L      Total Bilirubin 0.6 mg/dL      eGFR Non African Amer 62 mL/min/1.73      Globulin 3.6 gm/dL      A/G Ratio 1.4 g/dL      BUN/Creatinine Ratio 16.0     Anion Gap 11.0 mmol/L     Lipase [[id number]]  (Normal) Collected:  02/18/19 1350    Specimen:  Blood Updated:  02/18/19 1427     Lipase 89 U/L     Urinalysis With Culture If Indicated - Urine, Clean Catch [[id number]]  (Abnormal) Collected:  02/18/19 1408    Specimen:  Urine, Clean Catch Updated:  02/18/19 1420     Color, UA Yellow     Appearance, UA Cloudy     pH, UA 5.5     Specific Gravity, UA 1.028     Comment: Result obtained by Refractometer        Glucose, UA Negative      Ketones, UA Negative     Bilirubin, UA Small (1+)     Blood, UA Negative     Protein, UA Trace     Leuk Esterase, UA Negative     Nitrite, UA Negative     Urobilinogen, UA 0.2 E.U./dL    Narrative:       Urine microscopic not indicated.    Blood Culture - Blood, Arm, Left [[id number]] Collected:  02/18/19 1350    Specimen:  Blood from Arm, Left Updated:  02/18/19 1359    Blood Culture - Blood, Arm, Left [[id number]] Collected:  02/18/19 1350    Specimen:  Blood from Arm, Left Updated:  02/18/19 1359    Lavender Top [[id number]] Collected:  02/18/19 1231    Specimen:  Blood Updated:  02/18/19 1345     Extra Tube hold for add-on     Comment: Auto resulted       CBC Auto Differential [[id number]]  (Abnormal) Collected:  02/18/19 1231    Specimen:  Blood Updated:  02/18/19 1259     WBC 18.46 10*3/mm3      RBC 6.67 10*6/mm3      Hemoglobin 19.7 g/dL      Hematocrit 59.4 %      MCV 89.1 fL      MCH 29.5 pg      MCHC 33.2 g/dL      RDW 14.9 %      RDW-SD 44.7 fl      MPV 9.3 fL      Platelets 377 10*3/mm3      Neutrophil % 77.3 %      Lymphocyte % 14.4 %      Monocyte % 6.1 %      Eosinophil % 0.8 %      Basophil % 0.3 %      Immature Grans % 1.1 %      Neutrophils, Absolute 14.27 10*3/mm3      Lymphocytes, Absolute 2.65 10*3/mm3      Monocytes, Absolute 1.12 10*3/mm3      Eosinophils, Absolute 0.15 10*3/mm3      Basophils, Absolute 0.06 10*3/mm3      Immature Grans, Absolute 0.21 10*3/mm3      nRBC 0.0 /100 WBC         Imaging Results (last 24 hours)     Procedure Component Value Units Date/Time    CT Abdomen Pelvis With Contrast [[id number]] Collected:  02/18/19 1500     Updated:  02/18/19 1548    Narrative:         PROCEDURE: Ct abdomen and pelvis with contrast    REASON FOR EXAM: abdominal pain; elevated WBC    FINDINGS: Comparison study dated  November 19, 2018. Axial  computer tomography sequential imaging was performed from the  diaphragms through the symphysis pubis with IV contrast  administration. Sagittal and coronal  reformates was performed.  This exam was performed according to our departmental dose  optimization program, which includes automated exposure control,  adjustment of the mA and/or KV according to patient size and/or  use of iterative reconstruction technique.      Imaging through the lung bases reveals no abnormality.    Posterior segment right lobe of liver small stable hypodense mass  lesion which demonstrates peripheral globular enhancement. On  prior ultrasound this lesion was hyperechoic. These imaging  characteristics are most consistent with benign hemangioma which  measures 1.4 x 1.2 x 1.7 cm. The liver is otherwise normal. The  gallbladder surgically absent. The biliary system appears within  normal limits status post cholecystectomy. The pancreas is  normal. The spleen is normal. Bilateral adrenal glands are  normal. Right kidney and ureter are normal. Left kidney and  ureter are normal. The bladder is normal. The uterus is  surgically absent. Small amount of fluid is seen within the  central pelvis. No enhancing rind surrounding this fluid  collection. Small amount of fluid is seen tracking around the  liver with small to moderate amount of fluid track around the  spleen and extending into left paracolic gutter. No  lymphadenopathy in the abdomen or the pelvis. Imaging of the  hollow viscera reveals mild ascending  colon edematous wall  thickening with surrounding mild fatty stranding. Otherwise  hollow viscera is unremarkable. The appendix is identified  appears normal. No acute osseous abnormality. Old T8 mild  anterior vertebral body wedge compression fracture with loss of  anterior vertebral body height by 25%.      Impression:       1. Posterior segment right lobe of liver small mass lesion which  has imaging characteristics most consistent with benign  hemangioma.  2. Small amount of free fluid tracking around the liver. Small to  moderate amount of free fluid tracking around the spleen and  into  left paracolic gutter. Additional small fluid collection within  the lower central pelvis.  These fluid collections appear to  represent ascites. No definite changes to suggest inflammatory  changes and/or abscesses at this time.  3. Ascending colon mild edematous wall thickening and surrounding  mild fatty stranding. These findings would be suspicious for  changes from early mild colitis versus less likely ischemic  changes..  4.Old T8 mild anterior vertebral body wedge compression fracture  with loss of anterior vertebral body height by 25%..    Electronically signed by:  Prakash Jang MD  2/18/2019 3:47 PM CST  Workstation: [id number]            Assessment/plan :  Colitis with history of IBS   - maybe infectious versus ischemic  - start IV fluids , cipro and levaquin  - stool test, including C. difficile as she had been taking omnicef   - PRN IV morphine   - IV fluids   - prn IV zofran   - clear liquid diet       Sepsis   secondary to colitis  - will monitor lactic acid  - start IV fluids    Lactic acidosis   - maybe due to dehydration           Morbid obesity   Weight loss, diet and exercise recommended               I discussed the patient's findings and my recommendations with:  Patient     Code status and advance care of plan was discussed with patient and want to be  Full code     Mario Lazaro MD

## 2019-02-18 NOTE — ED PROVIDER NOTES
Subjective   Pt, hx of GERD, IBS, HTN, HLD, cholecystectomy and hysterectomy, reports chronic intermittent abdominal pain started 1 year ago and now in the ED due to persistent symptoms. Pt reports current abdominal pain started yesterday morning, went to , given Toradol and Bentyl with temporary relief.         History provided by:  Patient   used: No    Abdominal Pain   Pain location:  Generalized  Pain quality: aching    Pain radiates to:  Does not radiate  Pain severity:  Mild  Onset quality:  Gradual  Duration:  1 day  Timing:  Constant  Progression:  Unchanged  Chronicity:  Chronic  Associated symptoms: anorexia, diarrhea, nausea and sore throat    Associated symptoms: no cough, no dysuria, no fatigue, no shortness of breath and no vomiting        Review of Systems   Constitutional: Negative for fatigue.   HENT: Positive for sore throat. Negative for congestion.    Respiratory: Negative for cough and shortness of breath.    Gastrointestinal: Positive for abdominal pain, anorexia, diarrhea and nausea. Negative for vomiting.   Endocrine: Negative for polyuria.   Genitourinary: Negative for dysuria.   Musculoskeletal: Negative for arthralgias and back pain.   Skin: Negative for color change.   Allergic/Immunologic: Negative for immunocompromised state.   Neurological: Negative for dizziness, syncope and facial asymmetry.   Hematological: Negative for adenopathy.   Psychiatric/Behavioral: Negative for agitation and behavioral problems.   All other systems reviewed and are negative.      Past Medical History:   Diagnosis Date   • Acute maxillary sinusitis    • Adjustment disorder with anxiety    • Adjustment disorder with anxious mood    • Biliary calculus     post cholecystectomy      • Candidiasis    • Central abdominal pain    • Chronic pain    • Community acquired pneumonia    • Dental abscess    • Dental caries     o/e   • Depressive disorder      with some anxiety      • Depressive  disorder     not elsewhere classified      • Essential hypertension    • Essential hypertension    • Frequent hospital admissions    • Gastroesophageal reflux disease    • Gastroesophageal reflux disease    • Generalized anxiety disorder    • Hyperlipidemia    • Hyperreflexia    • Influenza     needs immunization   • Irritable bowel syndrome    • Major depressive disorder    • Malaise and fatigue    • Multiple joint pain    • Nausea and vomiting    • Need for prophylactic vaccination against Streptococcus pneumoniae (pneumococcus)    • Obesity    • Rhonchi     low-pitched   • Seizures (CMS/HCC)    • Tobacco dependence syndrome    • Upper respiratory infection        Allergies   Allergen Reactions   • Vancomycin Angioedema   • Ceclor [Cefaclor]    • Other      darvocet-N 100   • Reglan [Metoclopramide]    • Valtrex [Valacyclovir Hcl]    • Zithromax [Azithromycin]      z-iam       Past Surgical History:   Procedure Laterality Date   • CHOLECYSTECTOMY      laparoscopic (With cholangiogram. Symptomatic gallstones.)   06/03/2015    • ENDOSCOPY      w/ biopsy 03721 (Gastritis found inthe body of the stomach. EGD with biopsy.)   07/21/2015    • ENDOSCOPY      w/ tube 31887 (Normal esophagus. Gastritis in stomach. Biopsy taken. Normal duodenum. Biopsy taken.)   10/19/2011    • HYSTEROSCOPY  07/09/2008   • TOTAL ABDOMINAL HYSTERECTOMY WITH SALPINGO OOPHORECTOMY  09/03/2008   • TUBAL ABDOMINAL LIGATION  08/21/2006   • VAGINAL DELIVERY      , P2, had pre-eclampsia with both       Family History   Problem Relation Age of Onset   • Other Other         depressive disorder   • Diabetes Other    • Hypertension Other    • Other Other         Mother is diabetic, hypertensive, anxiety, depression. Father is 61, diabetic, hypertensive, had a CABG, first MI was at 50. He has had CVA's, TIA's. 10-year-old sister who is Type I diabetic       Social History     Socioeconomic History   • Marital status:      Spouse name: Not on file    • Number of children: Not on file   • Years of education: Not on file   • Highest education level: Not on file   Tobacco Use   • Smoking status: Current Every Day Smoker     Packs/day: 0.25     Types: Electronic Cigarette, Cigarettes   • Smokeless tobacco: Never Used   Substance and Sexual Activity   • Alcohol use: No   • Drug use: No           Objective   Physical Exam   Constitutional: She is oriented to person, place, and time. She appears well-developed and well-nourished.   HENT:   Head: Normocephalic.   Right Ear: Hearing normal.   Left Ear: Hearing normal.   Nose: Nose normal.   Eyes: Conjunctivae, EOM and lids are normal.   Neck: Trachea normal and full passive range of motion without pain.   Cardiovascular: Regular rhythm, S1 normal, S2 normal, normal heart sounds and normal pulses.   Pulmonary/Chest: Effort normal and breath sounds normal.   Abdominal: Normal appearance and bowel sounds are normal. There is tenderness.   Neurological: She is alert and oriented to person, place, and time. She is not disoriented.   Skin: Skin is warm and dry. She is not diaphoretic.   Psychiatric: She has a normal mood and affect. Her speech is normal and behavior is normal. Thought content normal.   Nursing note and vitals reviewed.      Procedures           Labs Reviewed   COMPREHENSIVE METABOLIC PANEL - Abnormal; Notable for the following components:       Result Value    Glucose 110 (*)     Albumin 5.00 (*)     eGFR Non African Amer 62 (*)     Globulin 3.6 (*)     All other components within normal limits   URINALYSIS W/ CULTURE IF INDICATED - Abnormal; Notable for the following components:    Appearance, UA Cloudy (*)     Bilirubin, UA Small (1+) (*)     Protein, UA Trace (*)     All other components within normal limits    Narrative:     Urine microscopic not indicated.   CBC WITH AUTO DIFFERENTIAL - Abnormal; Notable for the following components:    WBC 18.46 (*)     RBC 6.67 (*)     Hemoglobin 19.7 (*)      Hematocrit 59.4 (*)     Neutrophil % 77.3 (*)     Lymphocyte % 14.4 (*)     Immature Grans % 1.1 (*)     Neutrophils, Absolute 14.27 (*)     Monocytes, Absolute 1.12 (*)     Immature Grans, Absolute 0.21 (*)     All other components within normal limits   LACTIC ACID, PLASMA - Abnormal; Notable for the following components:    Lactate 2.6 (*)     All other components within normal limits   LACTIC ACID, REFLEX - Abnormal; Notable for the following components:    Lactate 3.5 (*)     All other components within normal limits   MANUAL DIFFERENTIAL - Abnormal; Notable for the following components:    Lymphocyte % 14.0 (*)     Neutrophils Absolute 14.21 (*)     Monocytes Absolute 0.92 (*)     Eosinophils Absolute 0.55 (*)     All other components within normal limits   LIPASE - Normal   BLOOD CULTURE   BLOOD CULTURE   GASTROINTESTINAL PANEL, PCR   STOOL CULTURE   CLOSTRIDIUM DIFFICILE TOXIN    Narrative:     The following orders were created for panel order Clostridium Difficile Toxin - Stool, Per Rectum.  Procedure                               Abnormality         Status                     ---------                               -----------         ------                     Clostridium Difficile To...[[id number]]                                                   Please view results for these tests on the individual orders.   CLOSTRIDIUM DIFFICILE TOXIN, PCR   RAINBOW DRAW    Narrative:     The following orders were created for panel order [place] Draw.  Procedure                               Abnormality         Status                     ---------                               -----------         ------                     Light Blue Top[[id number]]                                   Final result               Green Top (Gel)[[id number]]                                  Final result               Lavender Top[[id number]]                                     Final result               Gold Top - [facility][[id number]]                                    Final result                 Please view results for these tests on the individual orders.   LACTIC ACID REFLEX TIMER   CBC WITH AUTO DIFFERENTIAL   CBC AND DIFFERENTIAL    Narrative:     The following orders were created for panel order CBC & Differential.  Procedure                               Abnormality         Status                     ---------                               -----------         ------                     Manual Differential[[id number]]          Abnormal            Final result               Scan Slide[[id number]]                                                                  CBC Auto Differential[[id number]]        Abnormal            Final result                 Please view results for these tests on the individual orders.   LIGHT BLUE TOP   GREEN TOP   LAVENDER TOP   GOLD TOP - SST   EXTRA TUBES    Narrative:     The following orders were created for panel order Extra Tubes.  Procedure                               Abnormality         Status                     ---------                               -----------         ------                     Lavender Top[[id number]]                                     Final result                 Please view results for these tests on the individual orders.   LAVENDER TOP   CBC AND DIFFERENTIAL    Narrative:     The following orders were created for panel order CBC & Differential.  Procedure                               Abnormality         Status                     ---------                               -----------         ------                     CBC Auto Differential[[id number]]                                                         Please view results for these tests on the individual orders.       CT Abdomen Pelvis With Contrast   Final Result   1. Posterior segment right lobe of liver small mass lesion which   has imaging characteristics most consistent with benign   hemangioma.   2. Small amount of free fluid tracking  around the liver. Small to   moderate amount of free fluid tracking around the spleen and into   left paracolic gutter. Additional small fluid collection within   the lower central pelvis.  These fluid collections appear to   represent ascites. No definite changes to suggest inflammatory   changes and/or abscesses at this time.   3. Ascending colon mild edematous wall thickening and surrounding   mild fatty stranding. These findings would be suspicious for   changes from early mild colitis versus less likely ischemic   changes..   4.Old T8 mild anterior vertebral body wedge compression fracture   with loss of anterior vertebral body height by 25%..      Electronically signed by:  Prakash Jang MD  2/18/2019 3:47 PM CST   Workstation: [id number]      US Liver    (Results Pending)         ED Course                  MDM      Final diagnoses:   Colitis            Evelia Santana PA-C  02/18/19 2029

## 2019-02-18 NOTE — ED NOTES
Pt states she is unable to provide urine specimen at this time.      Cristina Garcia, RN  02/18/19 2619

## 2019-02-19 ENCOUNTER — APPOINTMENT (OUTPATIENT)
Dept: ULTRASOUND IMAGING | Facility: HOSPITAL | Age: 39
End: 2019-02-19

## 2019-02-19 LAB
ALBUMIN SERPL-MCNC: 3.3 G/DL (ref 3.4–4.8)
ALBUMIN/GLOB SERPL: 1.3 G/DL (ref 1.1–1.8)
ALP SERPL-CCNC: 68 U/L (ref 38–126)
ALT SERPL W P-5'-P-CCNC: 18 U/L (ref 9–52)
ANION GAP SERPL CALCULATED.3IONS-SCNC: 7 MMOL/L (ref 5–15)
AST SERPL-CCNC: 15 U/L (ref 14–36)
BASOPHILS # BLD AUTO: 0.05 10*3/MM3 (ref 0–0.2)
BASOPHILS NFR BLD AUTO: 0.4 % (ref 0–1.5)
BILIRUB SERPL-MCNC: 0.3 MG/DL (ref 0.2–1.3)
BUN BLD-MCNC: 12 MG/DL (ref 7–21)
BUN/CREAT SERPL: 15.2 (ref 7–25)
CALCIUM SPEC-SCNC: 8.9 MG/DL (ref 8.4–10.2)
CHLORIDE SERPL-SCNC: 105 MMOL/L (ref 95–110)
CO2 SERPL-SCNC: 23 MMOL/L (ref 22–31)
CREAT BLD-MCNC: 0.79 MG/DL (ref 0.5–1)
D-LACTATE SERPL-SCNC: 2.1 MMOL/L (ref 0.5–2)
DEPRECATED RDW RBC AUTO: 46 FL (ref 37–54)
EOSINOPHIL # BLD AUTO: 0.02 10*3/MM3 (ref 0–0.4)
EOSINOPHIL NFR BLD AUTO: 0.2 % (ref 0.3–6.2)
ERYTHROCYTE [DISTWIDTH] IN BLOOD BY AUTOMATED COUNT: 13.5 % (ref 12.3–15.4)
GFR SERPL CREATININE-BSD FRML MDRD: 82 ML/MIN/1.73 (ref 64–149)
GLOBULIN UR ELPH-MCNC: 2.6 GM/DL (ref 2.3–3.5)
GLUCOSE BLD-MCNC: 115 MG/DL (ref 60–100)
HCT VFR BLD AUTO: 45.8 % (ref 34–46.6)
HGB BLD-MCNC: 14.8 G/DL (ref 12–15.9)
IMM GRANULOCYTES # BLD AUTO: 0.18 10*3/MM3 (ref 0–0.05)
IMM GRANULOCYTES NFR BLD AUTO: 1.5 % (ref 0–0.5)
LYMPHOCYTES # BLD AUTO: 0.99 10*3/MM3 (ref 0.7–3.1)
LYMPHOCYTES NFR BLD AUTO: 8.1 % (ref 19.6–45.3)
MCH RBC QN AUTO: 30 PG (ref 26.6–33)
MCHC RBC AUTO-ENTMCNC: 32.3 G/DL (ref 31.5–35.7)
MCV RBC AUTO: 92.9 FL (ref 79–97)
MONOCYTES # BLD AUTO: 0.24 10*3/MM3 (ref 0.1–0.9)
MONOCYTES NFR BLD AUTO: 2 % (ref 5–12)
NEUTROPHILS # BLD AUTO: 10.68 10*3/MM3 (ref 1.4–7)
NEUTROPHILS NFR BLD AUTO: 87.8 % (ref 42.7–76)
NRBC BLD AUTO-RTO: 0 /100 WBC (ref 0–0)
PLATELET # BLD AUTO: 300 10*3/MM3 (ref 140–450)
PMV BLD AUTO: 8.7 FL (ref 6–12)
POTASSIUM BLD-SCNC: 5.1 MMOL/L (ref 3.5–5.1)
PROT SERPL-MCNC: 5.9 G/DL (ref 6.3–8.6)
RBC # BLD AUTO: 4.93 10*6/MM3 (ref 3.77–5.28)
SODIUM BLD-SCNC: 135 MMOL/L (ref 137–145)
WBC NRBC COR # BLD: 12.16 10*3/MM3 (ref 3.4–10.8)
WHOLE BLOOD HOLD SPECIMEN: NORMAL

## 2019-02-19 PROCEDURE — 96372 THER/PROPH/DIAG INJ SC/IM: CPT

## 2019-02-19 PROCEDURE — 76705 ECHO EXAM OF ABDOMEN: CPT

## 2019-02-19 PROCEDURE — G0378 HOSPITAL OBSERVATION PER HR: HCPCS

## 2019-02-19 PROCEDURE — 80053 COMPREHEN METABOLIC PANEL: CPT | Performed by: INTERNAL MEDICINE

## 2019-02-19 PROCEDURE — 99204 OFFICE O/P NEW MOD 45 MIN: CPT | Performed by: INTERNAL MEDICINE

## 2019-02-19 PROCEDURE — 25010000002 HEPARIN (PORCINE) PER 1000 UNITS: Performed by: INTERNAL MEDICINE

## 2019-02-19 PROCEDURE — 25010000002 LEVOFLOXACIN PER 250 MG: Performed by: INTERNAL MEDICINE

## 2019-02-19 PROCEDURE — 96375 TX/PRO/DX INJ NEW DRUG ADDON: CPT

## 2019-02-19 PROCEDURE — 96366 THER/PROPH/DIAG IV INF ADDON: CPT

## 2019-02-19 PROCEDURE — 83605 ASSAY OF LACTIC ACID: CPT | Performed by: HOSPITALIST

## 2019-02-19 PROCEDURE — 96361 HYDRATE IV INFUSION ADD-ON: CPT

## 2019-02-19 PROCEDURE — 25010000002 ONDANSETRON PER 1 MG: Performed by: INTERNAL MEDICINE

## 2019-02-19 PROCEDURE — 25010000002 KETOROLAC TROMETHAMINE PER 15 MG: Performed by: INTERNAL MEDICINE

## 2019-02-19 PROCEDURE — 96376 TX/PRO/DX INJ SAME DRUG ADON: CPT

## 2019-02-19 RX ADMIN — GABAPENTIN 800 MG: 400 CAPSULE ORAL at 21:04

## 2019-02-19 RX ADMIN — FAMOTIDINE 20 MG: 10 INJECTION INTRAVENOUS at 09:46

## 2019-02-19 RX ADMIN — HYDROCHLOROTHIAZIDE 25 MG: 25 TABLET ORAL at 09:44

## 2019-02-19 RX ADMIN — LEVETIRACETAM 500 MG: 500 TABLET, FILM COATED ORAL at 09:44

## 2019-02-19 RX ADMIN — FAMOTIDINE 20 MG: 10 INJECTION INTRAVENOUS at 20:00

## 2019-02-19 RX ADMIN — KETOROLAC TROMETHAMINE 30 MG: 30 INJECTION, SOLUTION INTRAMUSCULAR at 18:33

## 2019-02-19 RX ADMIN — ONDANSETRON 4 MG: 2 INJECTION INTRAMUSCULAR; INTRAVENOUS at 19:49

## 2019-02-19 RX ADMIN — ACETAMINOPHEN 650 MG: 325 TABLET, FILM COATED ORAL at 19:58

## 2019-02-19 RX ADMIN — KETOROLAC TROMETHAMINE 30 MG: 30 INJECTION, SOLUTION INTRAMUSCULAR at 03:58

## 2019-02-19 RX ADMIN — SODIUM CHLORIDE, PRESERVATIVE FREE 3 ML: 5 INJECTION INTRAVENOUS at 20:01

## 2019-02-19 RX ADMIN — HEPARIN SODIUM 5000 UNITS: 5000 INJECTION INTRAVENOUS; SUBCUTANEOUS at 21:04

## 2019-02-19 RX ADMIN — METRONIDAZOLE 500 MG: 500 INJECTION, SOLUTION INTRAVENOUS at 18:31

## 2019-02-19 RX ADMIN — SODIUM CHLORIDE 125 ML/HR: 9 INJECTION, SOLUTION INTRAVENOUS at 22:09

## 2019-02-19 RX ADMIN — METRONIDAZOLE 500 MG: 500 INJECTION, SOLUTION INTRAVENOUS at 03:52

## 2019-02-19 RX ADMIN — SODIUM CHLORIDE, PRESERVATIVE FREE 10 ML: 5 INJECTION INTRAVENOUS at 09:45

## 2019-02-19 RX ADMIN — LEVOFLOXACIN 500 MG: 5 INJECTION, SOLUTION INTRAVENOUS at 19:49

## 2019-02-19 RX ADMIN — HEPARIN SODIUM 5000 UNITS: 5000 INJECTION INTRAVENOUS; SUBCUTANEOUS at 14:50

## 2019-02-19 RX ADMIN — LISINOPRIL 40 MG: 40 TABLET ORAL at 09:50

## 2019-02-19 RX ADMIN — HEPARIN SODIUM 5000 UNITS: 5000 INJECTION INTRAVENOUS; SUBCUTANEOUS at 05:12

## 2019-02-19 RX ADMIN — POLYETHYLENE GLYCOL 3350, SODIUM SULFATE ANHYDROUS, SODIUM BICARBONATE, SODIUM CHLORIDE, POTASSIUM CHLORIDE 4000 ML: 236; 22.74; 6.74; 5.86; 2.97 POWDER, FOR SOLUTION ORAL at 18:58

## 2019-02-19 RX ADMIN — DICYCLOMINE HYDROCHLORIDE 10 MG: 10 CAPSULE ORAL at 21:04

## 2019-02-19 RX ADMIN — LEVETIRACETAM 500 MG: 500 TABLET, FILM COATED ORAL at 20:00

## 2019-02-19 RX ADMIN — GABAPENTIN 800 MG: 400 CAPSULE ORAL at 05:12

## 2019-02-19 RX ADMIN — GABAPENTIN 800 MG: 400 CAPSULE ORAL at 14:50

## 2019-02-19 RX ADMIN — METRONIDAZOLE 500 MG: 500 INJECTION, SOLUTION INTRAVENOUS at 12:17

## 2019-02-19 RX ADMIN — BUPRENORPHINE HYDROCHLORIDE AND NALOXONE HYDROCHLORIDE DIHYDRATE 1 TABLET: 8; 2 TABLET SUBLINGUAL at 09:44

## 2019-02-19 RX ADMIN — ONDANSETRON 4 MG: 2 INJECTION INTRAMUSCULAR; INTRAVENOUS at 03:58

## 2019-02-19 NOTE — PLAN OF CARE
Problem: Patient Care Overview  Goal: Plan of Care Review  Outcome: Ongoing (interventions implemented as appropriate)   02/18/19 0801   Coping/Psychosocial   Plan of Care Reviewed With patient     Goal: Individualization and Mutuality  Outcome: Ongoing (interventions implemented as appropriate)    Goal: Discharge Needs Assessment  Outcome: Ongoing (interventions implemented as appropriate)    Goal: Interprofessional Rounds/Family Conf  Outcome: Ongoing (interventions implemented as appropriate)      Problem: Bowel Disease, Inflammatory (Adult)  Goal: Signs and Symptoms of Listed Potential Problems Will be Absent, Minimized or Managed (Bowel Disease, Inflammatory)  Outcome: Ongoing (interventions implemented as appropriate)      Problem: Sepsis/Septic Shock (Adult)  Goal: Signs and Symptoms of Listed Potential Problems Will be Absent, Minimized or Managed (Sepsis/Septic Shock)  Outcome: Ongoing (interventions implemented as appropriate)

## 2019-02-19 NOTE — PROGRESS NOTES
[facility] Medicine Services  INPATIENT PROGRESS NOTE    Length of Stay: 0  Date of Admission: 2/18/2019  Primary Care Physician: Alfred Stahl APRN    Subjective   Chief Complaint: Abdominal pain, nausea, vomiting  HPI: All her symptoms are better today.  Patient never had a colonoscopy in the past.  Lactic acid close to normal.  Complains of some abdominal pain but denies any other symptoms.  GI to see the patient.    Review of Systems   Respiratory: Negative for shortness of breath.    Cardiovascular: Negative for chest pain.   Gastrointestinal: Positive for abdominal pain.        All pertinent negatives and positives are as above. All other systems have been reviewed and are negative unless otherwise stated.     Objective    Temp:  [97.6 °F (36.4 °C)-98.6 °F (37 °C)] (P) 97.6 °F (36.4 °C)  Heart Rate:  [] (P) 100  Resp:  [16-20] (P) 18  BP: (104-129)/(64-85) (P) 122/79    Physical Exam   Constitutional: She appears well-developed.   HENT:   Head: Normocephalic and atraumatic.   Eyes: Pupils are equal, round, and reactive to light.   Neck: Normal range of motion.   Cardiovascular: Normal rate.   Pulmonary/Chest: She has decreased breath sounds. She has no wheezes.   Abdominal: Soft. She exhibits no distension. There is tenderness. There is no rebound and no guarding.   Musculoskeletal: She exhibits no edema.   Neurological: She is alert.   Skin: Skin is warm and dry.   Psychiatric: She has a normal mood and affect.           Results Review:  I have reviewed the labs, radiology results, and diagnostic studies.    Laboratory Data:   Results from last 7 days   Lab Units 02/19/19  0621 02/18/19  1350   SODIUM mmol/L 135* 138   POTASSIUM mmol/L 5.1 4.0   CHLORIDE mmol/L 105 99   CO2 mmol/L 23.0 28.0   BUN mg/dL 12 16   CREATININE mg/dL 0.79 1.00   GLUCOSE mg/dL 115* 110*   CALCIUM mg/dL 8.9 10.0   BILIRUBIN mg/dL 0.3 0.6   ALK PHOS U/L 68 110   ALT (SGPT) U/L 18 25    AST (SGOT) U/L 15 21   ANION GAP mmol/L 7.0 11.0     Estimated Creatinine Clearance: 96.8 mL/min (by C-G formula based on SCr of 0.79 mg/dL).          Results from last 7 days   Lab Units 02/18/19  2346 02/18/19  1231   WBC 10*3/mm3 12.16* 18.46*   HEMOGLOBIN g/dL 14.8 19.7*   HEMATOCRIT % 45.8 59.4*   PLATELETS 10*3/mm3 300 377           Culture Data:   Blood Culture   Date Value Ref Range Status   02/18/2019 No growth at less than 24 hours  Preliminary   02/18/2019 No growth at less than 24 hours  Preliminary     No results found for: URINECX  No results found for: RESPCX  No results found for: WOUNDCX  No results found for: STOOLCX  No components found for: BODYFLD    Radiology Data:   Imaging Results (last 24 hours)     Procedure Component Value Units Date/Time    US Liver [[id number]] Updated:  02/19/19 0818    CT Abdomen Pelvis With Contrast [[id number]] Collected:  02/18/19 1500     Updated:  02/18/19 1548    Narrative:         PROCEDURE: Ct abdomen and pelvis with contrast    REASON FOR EXAM: abdominal pain; elevated WBC    FINDINGS: Comparison study dated  November 19, 2018. Axial  computer tomography sequential imaging was performed from the  diaphragms through the symphysis pubis with IV contrast  administration. Sagittal and coronal reformates was performed.  This exam was performed according to our departmental dose  optimization program, which includes automated exposure control,  adjustment of the mA and/or KV according to patient size and/or  use of iterative reconstruction technique.      Imaging through the lung bases reveals no abnormality.    Posterior segment right lobe of liver small stable hypodense mass  lesion which demonstrates peripheral globular enhancement. On  prior ultrasound this lesion was hyperechoic. These imaging  characteristics are most consistent with benign hemangioma which  measures 1.4 x 1.2 x 1.7 cm. The liver is otherwise normal. The  gallbladder surgically absent. The  biliary system appears within  normal limits status post cholecystectomy. The pancreas is  normal. The spleen is normal. Bilateral adrenal glands are  normal. Right kidney and ureter are normal. Left kidney and  ureter are normal. The bladder is normal. The uterus is  surgically absent. Small amount of fluid is seen within the  central pelvis. No enhancing rind surrounding this fluid  collection. Small amount of fluid is seen tracking around the  liver with small to moderate amount of fluid track around the  spleen and extending into left paracolic gutter. No  lymphadenopathy in the abdomen or the pelvis. Imaging of the  hollow viscera reveals mild ascending  colon edematous wall  thickening with surrounding mild fatty stranding. Otherwise  hollow viscera is unremarkable. The appendix is identified  appears normal. No acute osseous abnormality. Old T8 mild  anterior vertebral body wedge compression fracture with loss of  anterior vertebral body height by 25%.      Impression:       1. Posterior segment right lobe of liver small mass lesion which  has imaging characteristics most consistent with benign  hemangioma.  2. Small amount of free fluid tracking around the liver. Small to  moderate amount of free fluid tracking around the spleen and into  left paracolic gutter. Additional small fluid collection within  the lower central pelvis.  These fluid collections appear to  represent ascites. No definite changes to suggest inflammatory  changes and/or abscesses at this time.  3. Ascending colon mild edematous wall thickening and surrounding  mild fatty stranding. These findings would be suspicious for  changes from early mild colitis versus less likely ischemic  changes..  4.Old T8 mild anterior vertebral body wedge compression fracture  with loss of anterior vertebral body height by 25%..    Electronically signed by:  Prakash Jang MD  2/18/2019 3:47 PM CST  Workstation: [id number]          I have reviewed the patient's  current medications.     Assessment/Plan     Active Hospital Problems    Diagnosis   • Colitis       Plan:  1. abdominal pain: Improved.  Monitor  2.  Possible colitis: New, improved. infectious/ischemic.  Continue Levaquin and Flagyl.  Continue IV fluids.  Stool workup pending.  Discussed with Attila and he has agreed to see the patient.  3.  Lactic acidosis: Improved with IV fluids.  Monitor      Discharge Planning: I expect patient to be discharged to home in 2-3 days.    Signed     Dr Jonas Quezada DO   2/19/2019  12:17 PM

## 2019-02-19 NOTE — CONSULTS
SUBJECTIVE:   2/19/2019    Name: Tete Chen  DOD: 1980    REASON FOR CONSULT: Abdominal pain and diarrhea.    Chief Complaint:     Chief Complaint   Patient presents with   • Abdominal Pain       Subjective     Patient is 38 y.o. female who states for the past 6 months she has been having severe abdominal pain mostly in the epigastric region and diarrhea.  Patient states that this is waxed and waned but over the past few days has become much worse.  Patient underwent CT scan yesterday in the emergency room and was found to have a diffuse colitis and placed on antibiotics patient states she does feel little bit better today.  Patient denies any nausea vomiting today.     ROS/HISTORY/ CURRENT MEDICATIONS/OBJECTIVE/VS/PE:   Review of Systems:   Review of Systems   Constitutional: Negative for activity change, appetite change, chills, diaphoresis, fatigue, fever and unexpected weight change.   HENT: Negative for sore throat and trouble swallowing.    Respiratory: Negative for shortness of breath.    Gastrointestinal: Positive for abdominal pain and diarrhea. Negative for abdominal distention, anal bleeding, blood in stool, constipation, nausea, rectal pain and vomiting.   Endocrine: Negative for polydipsia, polyphagia and polyuria.   Genitourinary: Negative for difficulty urinating.   Musculoskeletal: Negative for arthralgias.   Skin: Negative for pallor.   Allergic/Immunologic: Negative for food allergies.   Neurological: Negative for weakness and light-headedness.   Psychiatric/Behavioral: Negative for behavioral problems.       History:     Past Medical History:   Diagnosis Date   • Acute maxillary sinusitis    • Adjustment disorder with anxiety    • Adjustment disorder with anxious mood    • Biliary calculus     post cholecystectomy      • Candidiasis    • Central abdominal pain    • Chronic pain    • Community acquired pneumonia    • Dental abscess    • Dental caries     o/e   • Depressive disorder       with some anxiety      • Depressive disorder     not elsewhere classified      • Essential hypertension    • Essential hypertension    • Frequent hospital admissions    • Gastroesophageal reflux disease    • Gastroesophageal reflux disease    • Generalized anxiety disorder    • Hyperlipidemia    • Hyperreflexia    • Influenza     needs immunization   • Irritable bowel syndrome    • Major depressive disorder    • Malaise and fatigue    • Multiple joint pain    • Nausea and vomiting    • Need for prophylactic vaccination against Streptococcus pneumoniae (pneumococcus)    • Obesity    • Rhonchi     low-pitched   • Seizures (CMS/HCC)    • Tobacco dependence syndrome    • Upper respiratory infection      Past Surgical History:   Procedure Laterality Date   • CHOLECYSTECTOMY      laparoscopic (With cholangiogram. Symptomatic gallstones.)   06/03/2015    • ENDOSCOPY      w/ biopsy [id number] (Gastritis found inthe body of the stomach. EGD with biopsy.)   07/21/2015    • ENDOSCOPY      w/ tube [id number] (Normal esophagus. Gastritis in stomach. Biopsy taken. Normal duodenum. Biopsy taken.)   10/19/2011    • HYSTEROSCOPY  07/09/2008   • TOTAL ABDOMINAL HYSTERECTOMY WITH SALPINGO OOPHORECTOMY  09/03/2008   • TUBAL ABDOMINAL LIGATION  08/21/2006   • VAGINAL DELIVERY      , P2, had pre-eclampsia with both     Family History   Problem Relation Age of Onset   • Other Other         depressive disorder   • Diabetes Other    • Hypertension Other    • Other Other         Mother is diabetic, hypertensive, anxiety, depression. Father is 61, diabetic, hypertensive, had a CABG, first MI was at 50. He has had CVA's, TIA's. 10-year-old sister who is Type I diabetic     Social History     Tobacco Use   • Smoking status: Current Every Day Smoker     Packs/day: 0.25     Types: Electronic Cigarette, Cigarettes   • Smokeless tobacco: Never Used   Substance Use Topics   • Alcohol use: No   • Drug use: No     Medications Prior to Admission   Medication Sig  Dispense Refill Last Dose   • acetaminophen (TYLENOL) 325 MG tablet Take 650 mg by mouth every 4 (four) hours as needed for mild pain (1-3).   2/18/2019 at Unknown time   • albuterol (PROVENTIL) (2.5 MG/3ML) 0.083% nebulizer solution    2/17/2019 at Unknown time   • ALPRAZolam (XANAX) 1 MG tablet Take 1 mg by mouth.   Past Week at Unknown time   • buprenorphine-naloxone (SUBOXONE) 8-2 MG per SL tablet Place 1 tablet under the tongue Daily.   2/17/2019 at Unknown time   • cefdinir (OMNICEF) 300 MG capsule TAKE ONE CAPSULE BY MOUTH TWICE DAILY FOR SEVEN DAYS  0 2/17/2019 at Unknown time   • diclofenac (VOLTAREN) 75 MG EC tablet TAKE 1 TABLET BY MOUTH EVERY 12 (TWELVE) HOURS AS NEEDED (PAIN) FOR UP TO 30 DAYS. 60 tablet 1 Past Month at Unknown time   • dicyclomine (BENTYL) 10 MG capsule Take 1 capsule by mouth 3 (Three) Times a Day As Needed (abdominal cramping). 21 capsule 0 2/17/2019 at Unknown time   • gabapentin (NEURONTIN) 800 MG tablet Take 1 tablet by mouth 3 (Three) Times a Day. 90 tablet 1 2/17/2019 at Unknown time   • hydrochlorothiazide (HYDRODIURIL) 25 MG tablet Take 25 mg by mouth Daily.   2/17/2019 at Unknown time   • levETIRAcetam (KEPPRA) 500 MG tablet Take 1 tablet by mouth 2 (Two) Times a Day. 60 tablet 1 2/17/2019 at Unknown time   • lisinopril (PRINIVIL,ZESTRIL) 40 MG tablet Take 40 mg by mouth Daily.   2/17/2019 at Unknown time   • MethylPREDNISolone (MEDROL, SELINA,) 4 MG tablet see package  0 Past Week at Unknown time   • TRINTELLIX 10 MG tablet Take 1 tablet by mouth Daily.  5 2/17/2019 at Unknown time   • VENTOLIN  (90 Base) MCG/ACT inhaler    2/17/2019 at Unknown time     Allergies:  Vancomycin; Ceclor [cefaclor]; Reglan [metoclopramide]; Valtrex [valacyclovir hcl]; and Zithromax [azithromycin]    I have reviewed the patient's medical history, surgical history and family history in the available medical record system.     Current Medications:     Current Facility-Administered Medications    Medication Dose Route Frequency Provider Last Rate Last Dose   • acetaminophen (TYLENOL) tablet 650 mg  650 mg Oral Q4H PRN Mario Lazaro MD       • albuterol (PROVENTIL) nebulizer solution 0.083% 2.5 mg/3mL  2.5 mg Nebulization Q6H PRN Mario Lazaro MD       • buprenorphine-naloxone (SUBOXONE) 8-2 MG per SL tablet 1 tablet  1 tablet Sublingual Daily Mario Lazaro MD   1 tablet at 02/19/19 0944   • calcium carbonate (TUMS) chewable tablet 500 mg (200 mg elemental)  2 tablet Oral BID PRN Mario Lazaro MD       • dicyclomine (BENTYL) capsule 10 mg  10 mg Oral TID PRN Mario Lazaro MD       • famotidine (PEPCID) injection 20 mg  20 mg Intravenous Q12H Mario Lazaro MD   20 mg at 02/19/19 0946   • gabapentin (NEURONTIN) capsule 800 mg  800 mg Oral Q8H Mario Lazaro MD   800 mg at 02/19/19 1450   • heparin (porcine) 5000 UNIT/ML injection 5,000 Units  5,000 Units Subcutaneous Q8H Mario Lazaro MD   5,000 Units at 02/19/19 1450   • hydrochlorothiazide (HYDRODIURIL) tablet 25 mg  25 mg Oral Daily Mario Lazaro MD   25 mg at 02/19/19 0944   • ketorolac (TORADOL) injection 30 mg  30 mg Intravenous Q6H PRN Mario Lazaro MD   30 mg at 02/19/19 0358   • levETIRAcetam (KEPPRA) tablet 500 mg  500 mg Oral Q12H Mario Lazaro MD   500 mg at 02/19/19 0944   • levoFLOXacin (LEVAQUIN) 500 mg/100 mL D5W (premix) (LEVAQUIN) 500 mg  500 mg Intravenous Q24H Mario Lazaro MD   Stopped at 02/18/19 2245   • lisinopril (PRINIVIL,ZESTRIL) tablet 40 mg  40 mg Oral Daily Mario Lazaro MD   40 mg at 02/19/19 0950   • metroNIDAZOLE (FLAGYL) IVPB 500 mg  500 mg Intravenous Q8H Mario Lazaro MD   Stopped at 02/19/19 1436   • morphine injection 2 mg  2 mg Intravenous Q3H PRN Mario Lazaro MD   2 mg at 02/18/19 1937   • ondansetron (ZOFRAN) tablet 4 mg  4 mg Oral Q6H PRN [place]  Mario SEXTON MD        Or   • ondansetron ODT (ZOFRAN-ODT) disintegrating tablet 4 mg  4 mg Oral Q6H PRN Mario Lazaro MD        Or   • ondansetron (ZOFRAN) injection 4 mg  4 mg Intravenous Q6H PRN Mario Lazaro MD   4 mg at 02/19/19 0358   • polyethylene glycol (GoLYTELY) solution 4,000 mL  4,000 mL Oral Once Virgil Aiken MD       • sennosides-docusate sodium (SENOKOT-S) 8.6-50 MG tablet 2 tablet  2 tablet Oral BID PRN Mario Lazaro MD       • sodium chloride 0.9 % flush 3 mL  3 mL Intravenous Q12H Mario Lazaro MD   3 mL at 02/18/19 2144   • sodium chloride 0.9 % flush 3-10 mL  3-10 mL Intravenous PRN Mario Lazaro MD   10 mL at 02/19/19 0945   • sodium chloride 0.9 % infusion  125 mL/hr Intravenous Continuous Jonas Quezada  mL/hr at 02/19/19 1643 125 mL/hr at 02/19/19 1643   • [START ON 2/20/2019] Vortioxetine HBr tablet 10 mg  10 mg Oral Daily Jonas Quezada DO           Objective     Physical Exam:   Temp:  [97.6 °F (36.4 °C)-98.6 °F (37 °C)] 98 °F (36.7 °C)  Heart Rate:  [] 88  Resp:  [16-19] 18  BP: (110-130)/(64-90) 130/90    Physical Exam:  General Appearance:    Alert, cooperative, in no acute distress   Head:    Normocephalic, without obvious abnormality, atraumatic   Eyes:            Lids and lashes normal, conjunctivae and sclerae normal, no   icterus, no pallor, corneas clear, PERRLA   Ears:    Ears appear intact with no abnormalities noted   Throat:   No oral lesions, no thrush, oral mucosa moist   Neck:   No adenopathy, supple, trachea midline, no thyromegaly, no     carotid bruit, no JVD   Back:     No kyphosis present, no scoliosis present, no skin lesions,       erythema or scars, no tenderness to percussion or                   palpation,   range of motion normal   Lungs:     Clear to auscultation,respirations regular, even and                   unlabored    Heart:    Regular rhythm and normal rate, normal S1 and S2, no             murmur, no gallop, no rub, no click   Breast Exam:    Deferred   Abdomen:     Normal bowel sounds, no masses, no organomegaly, soft        non-tender, non-distended, no guarding, no rebound                 tenderness   Genitalia:    Deferred   Extremities:   Moves all extremities well, no edema, no cyanosis, no              redness   Pulses:   Pulses palpable and equal bilaterally   Skin:   No bleeding, bruising or rash   Lymph nodes:   No palpable adenopathy   Neurologic:   Cranial nerves 2 - 12 grossly intact, sensation intact, DTR        present and equal bilaterally      Results Review:     Lab Results   Component Value Date    WBC 12.16 (H) 02/18/2019    WBC 18.46 (H) 02/18/2019    WBC 6.24 11/19/2018    HGB 14.8 02/18/2019    HGB 19.7 (H) 02/18/2019    HGB 14.2 11/19/2018    HCT 45.8 02/18/2019    HCT 59.4 (H) 02/18/2019    HCT 42.7 11/19/2018     02/18/2019     02/18/2019     11/19/2018     Results from last 7 days   Lab Units 02/19/19  0621 02/18/19  1350   ALK PHOS U/L 68 110   ALT (SGPT) U/L 18 25   AST (SGOT) U/L 15 21     Results from last 7 days   Lab Units 02/19/19  0621 02/18/19  1350   BILIRUBIN mg/dL 0.3 0.6   ALK PHOS U/L 68 110     Lipase   Date Value Ref Range Status   02/18/2019 89 23 - 300 U/L Final     Lab Results   Component Value Date    INR 0.96 10/09/2017    INR 1.0 06/01/2015         Radiology Review:  Imaging Results (last 72 hours)     Procedure Component Value Units Date/Time    US Liver [[id number]] Collected:  02/19/19 0750     Updated:  02/19/19 1354    Narrative:       NAME: ARNULFO CHAIREZ  PROCEDURE: US LIVER  ORDER DATE: 2/19/2019 7:50 AM CST  ACCESSION NUMBER: [id number]      Clinical History: fluid around the liver, K52.9 Noninfective  gastroenteritis and colitis, unspecified    Indication: Same as above.    Comparison: 10/12/2017-ultrasound of the abdomen and CT of the  abdomen and pelvis done on 2/18/2019 .    Technique: Gray scale ultrasound  evaluation of the right upper  quadrant of the abdomen was done  along with limited color  Doppler evaluation    Findings:     The gallbladder is surgically absent    The common duct is normal in transverse diameter and measures 4.6  mm. There is no intraductal common duct calculus.    There are no focal liver lesions. The previously seen lesion in  the posterior segment of the right lobe of the liver, on the CT  of the abdomen and pelvis done on 2/18/2019, is difficult to  visualize on the current study. There is no intrahepatic biliary  dilatation.    There is sliver of ascites fluid superior to the liver    The main portal vein is of normal caliber and shows normal  hepatopedal blood flow. The hepatic veins are patent.    The pancreas is obscured by the bowel gas.     The right kidney measures 9.8  cm in length. There is no  hydronephrosis or nephrolithiasis in the right kidney.    The visualized portion of the abdominal aorta and the inferior  vena cava are unremarkable.    There is no  documented right-sided pleural effusion.      Impression:       Impression:     There are no focal liver lesions. The previously seen lesion in  the posterior segment of the right lobe of the liver, on the CT  of the abdomen and pelvis done on 2/18/2019, is difficult to  visualize on the current study. On the CT examination this lesion  was suspected to be a hemangioma and can be further assessed and  confirmed with a dedicated IV contrast-enhanced CT of the liver,  utilizing hemangioma protocol.    There is sliver of ascites fluid superior to the liver    Electronically signed by:  Tobi Koroma MD  2/19/2019 1:53 PM CST  Workstation: [id number]    CT Abdomen Pelvis With Contrast [[id number]] Collected:  02/18/19 1500     Updated:  02/18/19 1548    Narrative:         PROCEDURE: Ct abdomen and pelvis with contrast    REASON FOR EXAM: abdominal pain; elevated WBC    FINDINGS: Comparison study dated  November 19, 2018.  Axial  computer tomography sequential imaging was performed from the  diaphragms through the symphysis pubis with IV contrast  administration. Sagittal and coronal reformates was performed.  This exam was performed according to our departmental dose  optimization program, which includes automated exposure control,  adjustment of the mA and/or KV according to patient size and/or  use of iterative reconstruction technique.      Imaging through the lung bases reveals no abnormality.    Posterior segment right lobe of liver small stable hypodense mass  lesion which demonstrates peripheral globular enhancement. On  prior ultrasound this lesion was hyperechoic. These imaging  characteristics are most consistent with benign hemangioma which  measures 1.4 x 1.2 x 1.7 cm. The liver is otherwise normal. The  gallbladder surgically absent. The biliary system appears within  normal limits status post cholecystectomy. The pancreas is  normal. The spleen is normal. Bilateral adrenal glands are  normal. Right kidney and ureter are normal. Left kidney and  ureter are normal. The bladder is normal. The uterus is  surgically absent. Small amount of fluid is seen within the  central pelvis. No enhancing rind surrounding this fluid  collection. Small amount of fluid is seen tracking around the  liver with small to moderate amount of fluid track around the  spleen and extending into left paracolic gutter. No  lymphadenopathy in the abdomen or the pelvis. Imaging of the  hollow viscera reveals mild ascending  colon edematous wall  thickening with surrounding mild fatty stranding. Otherwise  hollow viscera is unremarkable. The appendix is identified  appears normal. No acute osseous abnormality. Old T8 mild  anterior vertebral body wedge compression fracture with loss of  anterior vertebral body height by 25%.      Impression:       1. Posterior segment right lobe of liver small mass lesion which  has imaging characteristics most  consistent with benign  hemangioma.  2. Small amount of free fluid tracking around the liver. Small to  moderate amount of free fluid tracking around the spleen and into  left paracolic gutter. Additional small fluid collection within  the lower central pelvis.  These fluid collections appear to  represent ascites. No definite changes to suggest inflammatory  changes and/or abscesses at this time.  3. Ascending colon mild edematous wall thickening and surrounding  mild fatty stranding. These findings would be suspicious for  changes from early mild colitis versus less likely ischemic  changes..  4.Old T8 mild anterior vertebral body wedge compression fracture  with loss of anterior vertebral body height by 25%..    Electronically signed by:  Prakash Jang MD  2/18/2019 3:47 PM CST  Workstation: [id number]          I reviewed the patient's new clinical results.    I reviewed the patient's new imaging results and agree with the interpretation.     ASSESSMENT/PLAN:   ASSESSMENT: Patient with colitis on CT scan possibly a chronic colitis as patient has had symptoms for the past 6 months.  Much less likely would be an ischemic colitis it is possible this could be just an infectious colitis.  Patient with a mildly elevated lactic acid patient is currently receiving IV hydration.    PLAN: #1 continue IV hydration.  Continue IV antibiotics.  Patient should remain n.p.o. we will schedule patient for colonoscopy to be done tomorrow with biopsy of the area of inflammation.  The risks, benefits, and alternatives of this procedure have been discussed with the patient or the responsible party. The patient understands and agrees to proceed.         Virgil Aiken MD  02/19/19  4:46 PM         This document has been electronically signed by Virgil Aiken MD on February 19, 2019 4:46 PM

## 2019-02-19 NOTE — H&P (VIEW-ONLY)
SUBJECTIVE:   2/19/2019    Name: Tete Chen  DOD: 1980    REASON FOR CONSULT: Abdominal pain and diarrhea.    Chief Complaint:     Chief Complaint   Patient presents with   • Abdominal Pain       Subjective     Patient is 38 y.o. female who states for the past 6 months she has been having severe abdominal pain mostly in the epigastric region and diarrhea.  Patient states that this is waxed and waned but over the past few days has become much worse.  Patient underwent CT scan yesterday in the emergency room and was found to have a diffuse colitis and placed on antibiotics patient states she does feel little bit better today.  Patient denies any nausea vomiting today.     ROS/HISTORY/ CURRENT MEDICATIONS/OBJECTIVE/VS/PE:   Review of Systems:   Review of Systems   Constitutional: Negative for activity change, appetite change, chills, diaphoresis, fatigue, fever and unexpected weight change.   HENT: Negative for sore throat and trouble swallowing.    Respiratory: Negative for shortness of breath.    Gastrointestinal: Positive for abdominal pain and diarrhea. Negative for abdominal distention, anal bleeding, blood in stool, constipation, nausea, rectal pain and vomiting.   Endocrine: Negative for polydipsia, polyphagia and polyuria.   Genitourinary: Negative for difficulty urinating.   Musculoskeletal: Negative for arthralgias.   Skin: Negative for pallor.   Allergic/Immunologic: Negative for food allergies.   Neurological: Negative for weakness and light-headedness.   Psychiatric/Behavioral: Negative for behavioral problems.       History:     Past Medical History:   Diagnosis Date   • Acute maxillary sinusitis    • Adjustment disorder with anxiety    • Adjustment disorder with anxious mood    • Biliary calculus     post cholecystectomy      • Candidiasis    • Central abdominal pain    • Chronic pain    • Community acquired pneumonia    • Dental abscess    • Dental caries     o/e   • Depressive disorder       with some anxiety      • Depressive disorder     not elsewhere classified      • Essential hypertension    • Essential hypertension    • Frequent hospital admissions    • Gastroesophageal reflux disease    • Gastroesophageal reflux disease    • Generalized anxiety disorder    • Hyperlipidemia    • Hyperreflexia    • Influenza     needs immunization   • Irritable bowel syndrome    • Major depressive disorder    • Malaise and fatigue    • Multiple joint pain    • Nausea and vomiting    • Need for prophylactic vaccination against Streptococcus pneumoniae (pneumococcus)    • Obesity    • Rhonchi     low-pitched   • Seizures (CMS/HCC)    • Tobacco dependence syndrome    • Upper respiratory infection      Past Surgical History:   Procedure Laterality Date   • CHOLECYSTECTOMY      laparoscopic (With cholangiogram. Symptomatic gallstones.)   06/03/2015    • ENDOSCOPY      w/ biopsy [id number] (Gastritis found inthe body of the stomach. EGD with biopsy.)   07/21/2015    • ENDOSCOPY      w/ tube [id number] (Normal esophagus. Gastritis in stomach. Biopsy taken. Normal duodenum. Biopsy taken.)   10/19/2011    • HYSTEROSCOPY  07/09/2008   • TOTAL ABDOMINAL HYSTERECTOMY WITH SALPINGO OOPHORECTOMY  09/03/2008   • TUBAL ABDOMINAL LIGATION  08/21/2006   • VAGINAL DELIVERY      , P2, had pre-eclampsia with both     Family History   Problem Relation Age of Onset   • Other Other         depressive disorder   • Diabetes Other    • Hypertension Other    • Other Other         Mother is diabetic, hypertensive, anxiety, depression. Father is 61, diabetic, hypertensive, had a CABG, first MI was at 50. He has had CVA's, TIA's. 10-year-old sister who is Type I diabetic     Social History     Tobacco Use   • Smoking status: Current Every Day Smoker     Packs/day: 0.25     Types: Electronic Cigarette, Cigarettes   • Smokeless tobacco: Never Used   Substance Use Topics   • Alcohol use: No   • Drug use: No     Medications Prior to Admission   Medication Sig  Dispense Refill Last Dose   • acetaminophen (TYLENOL) 325 MG tablet Take 650 mg by mouth every 4 (four) hours as needed for mild pain (1-3).   2/18/2019 at Unknown time   • albuterol (PROVENTIL) (2.5 MG/3ML) 0.083% nebulizer solution    2/17/2019 at Unknown time   • ALPRAZolam (XANAX) 1 MG tablet Take 1 mg by mouth.   Past Week at Unknown time   • buprenorphine-naloxone (SUBOXONE) 8-2 MG per SL tablet Place 1 tablet under the tongue Daily.   2/17/2019 at Unknown time   • cefdinir (OMNICEF) 300 MG capsule TAKE ONE CAPSULE BY MOUTH TWICE DAILY FOR SEVEN DAYS  0 2/17/2019 at Unknown time   • diclofenac (VOLTAREN) 75 MG EC tablet TAKE 1 TABLET BY MOUTH EVERY 12 (TWELVE) HOURS AS NEEDED (PAIN) FOR UP TO 30 DAYS. 60 tablet 1 Past Month at Unknown time   • dicyclomine (BENTYL) 10 MG capsule Take 1 capsule by mouth 3 (Three) Times a Day As Needed (abdominal cramping). 21 capsule 0 2/17/2019 at Unknown time   • gabapentin (NEURONTIN) 800 MG tablet Take 1 tablet by mouth 3 (Three) Times a Day. 90 tablet 1 2/17/2019 at Unknown time   • hydrochlorothiazide (HYDRODIURIL) 25 MG tablet Take 25 mg by mouth Daily.   2/17/2019 at Unknown time   • levETIRAcetam (KEPPRA) 500 MG tablet Take 1 tablet by mouth 2 (Two) Times a Day. 60 tablet 1 2/17/2019 at Unknown time   • lisinopril (PRINIVIL,ZESTRIL) 40 MG tablet Take 40 mg by mouth Daily.   2/17/2019 at Unknown time   • MethylPREDNISolone (MEDROL, SELINA,) 4 MG tablet see package  0 Past Week at Unknown time   • TRINTELLIX 10 MG tablet Take 1 tablet by mouth Daily.  5 2/17/2019 at Unknown time   • VENTOLIN  (90 Base) MCG/ACT inhaler    2/17/2019 at Unknown time     Allergies:  Vancomycin; Ceclor [cefaclor]; Reglan [metoclopramide]; Valtrex [valacyclovir hcl]; and Zithromax [azithromycin]    I have reviewed the patient's medical history, surgical history and family history in the available medical record system.     Current Medications:     Current Facility-Administered Medications      Medication Dose Route Frequency Provider Last Rate Last Dose   • acetaminophen (TYLENOL) tablet 650 mg  650 mg Oral Q4H PRN Mario Lazaro MD       • albuterol (PROVENTIL) nebulizer solution 0.083% 2.5 mg/3mL  2.5 mg Nebulization Q6H PRN Mario Lazaro MD       • buprenorphine-naloxone (SUBOXONE) 8-2 MG per SL tablet 1 tablet  1 tablet Sublingual Daily Mario Lazaro MD   1 tablet at 02/19/19 0944   • calcium carbonate (TUMS) chewable tablet 500 mg (200 mg elemental)  2 tablet Oral BID PRN Mario Lazaro MD       • dicyclomine (BENTYL) capsule 10 mg  10 mg Oral TID PRN Mario Lazaro MD       • famotidine (PEPCID) injection 20 mg  20 mg Intravenous Q12H Mario Lazaro MD   20 mg at 02/19/19 0946   • gabapentin (NEURONTIN) capsule 800 mg  800 mg Oral Q8H Mario Lazaro MD   800 mg at 02/19/19 1450   • heparin (porcine) 5000 UNIT/ML injection 5,000 Units  5,000 Units Subcutaneous Q8H Mario Lazaro MD   5,000 Units at 02/19/19 1450   • hydrochlorothiazide (HYDRODIURIL) tablet 25 mg  25 mg Oral Daily Mario Lazaro MD   25 mg at 02/19/19 0944   • ketorolac (TORADOL) injection 30 mg  30 mg Intravenous Q6H PRN Mario Lazaro MD   30 mg at 02/19/19 0358   • levETIRAcetam (KEPPRA) tablet 500 mg  500 mg Oral Q12H Mario Lazaro MD   500 mg at 02/19/19 0944   • levoFLOXacin (LEVAQUIN) 500 mg/100 mL D5W (premix) (LEVAQUIN) 500 mg  500 mg Intravenous Q24H Mario Lazaro MD   Stopped at 02/18/19 2245   • lisinopril (PRINIVIL,ZESTRIL) tablet 40 mg  40 mg Oral Daily Mario Lazaro MD   40 mg at 02/19/19 0950   • metroNIDAZOLE (FLAGYL) IVPB 500 mg  500 mg Intravenous Q8H Mario Lazaro MD   Stopped at 02/19/19 1436   • morphine injection 2 mg  2 mg Intravenous Q3H PRN Mario Lazaro MD   2 mg at 02/18/19 1937   • ondansetron (ZOFRAN) tablet 4 mg  4 mg Oral Q6H PRN [place]  Mario SEXTON MD        Or   • ondansetron ODT (ZOFRAN-ODT) disintegrating tablet 4 mg  4 mg Oral Q6H PRN Mario Lazaro MD        Or   • ondansetron (ZOFRAN) injection 4 mg  4 mg Intravenous Q6H PRN Mario Lazaro MD   4 mg at 02/19/19 0358   • polyethylene glycol (GoLYTELY) solution 4,000 mL  4,000 mL Oral Once Virgil Aiken MD       • sennosides-docusate sodium (SENOKOT-S) 8.6-50 MG tablet 2 tablet  2 tablet Oral BID PRN Mario Lazaro MD       • sodium chloride 0.9 % flush 3 mL  3 mL Intravenous Q12H Mario Lazaro MD   3 mL at 02/18/19 2144   • sodium chloride 0.9 % flush 3-10 mL  3-10 mL Intravenous PRN Mario Lazaro MD   10 mL at 02/19/19 0945   • sodium chloride 0.9 % infusion  125 mL/hr Intravenous Continuous Jonas Quezada  mL/hr at 02/19/19 1643 125 mL/hr at 02/19/19 1643   • [START ON 2/20/2019] Vortioxetine HBr tablet 10 mg  10 mg Oral Daily Jonas Quezada DO           Objective     Physical Exam:   Temp:  [97.6 °F (36.4 °C)-98.6 °F (37 °C)] 98 °F (36.7 °C)  Heart Rate:  [] 88  Resp:  [16-19] 18  BP: (110-130)/(64-90) 130/90    Physical Exam:  General Appearance:    Alert, cooperative, in no acute distress   Head:    Normocephalic, without obvious abnormality, atraumatic   Eyes:            Lids and lashes normal, conjunctivae and sclerae normal, no   icterus, no pallor, corneas clear, PERRLA   Ears:    Ears appear intact with no abnormalities noted   Throat:   No oral lesions, no thrush, oral mucosa moist   Neck:   No adenopathy, supple, trachea midline, no thyromegaly, no     carotid bruit, no JVD   Back:     No kyphosis present, no scoliosis present, no skin lesions,       erythema or scars, no tenderness to percussion or                   palpation,   range of motion normal   Lungs:     Clear to auscultation,respirations regular, even and                   unlabored    Heart:    Regular rhythm and normal rate, normal S1 and S2, no             murmur, no gallop, no rub, no click   Breast Exam:    Deferred   Abdomen:     Normal bowel sounds, no masses, no organomegaly, soft        non-tender, non-distended, no guarding, no rebound                 tenderness   Genitalia:    Deferred   Extremities:   Moves all extremities well, no edema, no cyanosis, no              redness   Pulses:   Pulses palpable and equal bilaterally   Skin:   No bleeding, bruising or rash   Lymph nodes:   No palpable adenopathy   Neurologic:   Cranial nerves 2 - 12 grossly intact, sensation intact, DTR        present and equal bilaterally      Results Review:     Lab Results   Component Value Date    WBC 12.16 (H) 02/18/2019    WBC 18.46 (H) 02/18/2019    WBC 6.24 11/19/2018    HGB 14.8 02/18/2019    HGB 19.7 (H) 02/18/2019    HGB 14.2 11/19/2018    HCT 45.8 02/18/2019    HCT 59.4 (H) 02/18/2019    HCT 42.7 11/19/2018     02/18/2019     02/18/2019     11/19/2018     Results from last 7 days   Lab Units 02/19/19  0621 02/18/19  1350   ALK PHOS U/L 68 110   ALT (SGPT) U/L 18 25   AST (SGOT) U/L 15 21     Results from last 7 days   Lab Units 02/19/19  0621 02/18/19  1350   BILIRUBIN mg/dL 0.3 0.6   ALK PHOS U/L 68 110     Lipase   Date Value Ref Range Status   02/18/2019 89 23 - 300 U/L Final     Lab Results   Component Value Date    INR 0.96 10/09/2017    INR 1.0 06/01/2015         Radiology Review:  Imaging Results (last 72 hours)     Procedure Component Value Units Date/Time    US Liver [[id number]] Collected:  02/19/19 0750     Updated:  02/19/19 1354    Narrative:       NAME: ARNULFO CHAIREZ  PROCEDURE: US LIVER  ORDER DATE: 2/19/2019 7:50 AM CST  ACCESSION NUMBER: [id number]      Clinical History: fluid around the liver, K52.9 Noninfective  gastroenteritis and colitis, unspecified    Indication: Same as above.    Comparison: 10/12/2017-ultrasound of the abdomen and CT of the  abdomen and pelvis done on 2/18/2019 .    Technique: Gray scale ultrasound  evaluation of the right upper  quadrant of the abdomen was done  along with limited color  Doppler evaluation    Findings:     The gallbladder is surgically absent    The common duct is normal in transverse diameter and measures 4.6  mm. There is no intraductal common duct calculus.    There are no focal liver lesions. The previously seen lesion in  the posterior segment of the right lobe of the liver, on the CT  of the abdomen and pelvis done on 2/18/2019, is difficult to  visualize on the current study. There is no intrahepatic biliary  dilatation.    There is sliver of ascites fluid superior to the liver    The main portal vein is of normal caliber and shows normal  hepatopedal blood flow. The hepatic veins are patent.    The pancreas is obscured by the bowel gas.     The right kidney measures 9.8  cm in length. There is no  hydronephrosis or nephrolithiasis in the right kidney.    The visualized portion of the abdominal aorta and the inferior  vena cava are unremarkable.    There is no  documented right-sided pleural effusion.      Impression:       Impression:     There are no focal liver lesions. The previously seen lesion in  the posterior segment of the right lobe of the liver, on the CT  of the abdomen and pelvis done on 2/18/2019, is difficult to  visualize on the current study. On the CT examination this lesion  was suspected to be a hemangioma and can be further assessed and  confirmed with a dedicated IV contrast-enhanced CT of the liver,  utilizing hemangioma protocol.    There is sliver of ascites fluid superior to the liver    Electronically signed by:  Tobi Koroma MD  2/19/2019 1:53 PM CST  Workstation: [id number]    CT Abdomen Pelvis With Contrast [[id number]] Collected:  02/18/19 1500     Updated:  02/18/19 1548    Narrative:         PROCEDURE: Ct abdomen and pelvis with contrast    REASON FOR EXAM: abdominal pain; elevated WBC    FINDINGS: Comparison study dated  November 19, 2018.  Axial  computer tomography sequential imaging was performed from the  diaphragms through the symphysis pubis with IV contrast  administration. Sagittal and coronal reformates was performed.  This exam was performed according to our departmental dose  optimization program, which includes automated exposure control,  adjustment of the mA and/or KV according to patient size and/or  use of iterative reconstruction technique.      Imaging through the lung bases reveals no abnormality.    Posterior segment right lobe of liver small stable hypodense mass  lesion which demonstrates peripheral globular enhancement. On  prior ultrasound this lesion was hyperechoic. These imaging  characteristics are most consistent with benign hemangioma which  measures 1.4 x 1.2 x 1.7 cm. The liver is otherwise normal. The  gallbladder surgically absent. The biliary system appears within  normal limits status post cholecystectomy. The pancreas is  normal. The spleen is normal. Bilateral adrenal glands are  normal. Right kidney and ureter are normal. Left kidney and  ureter are normal. The bladder is normal. The uterus is  surgically absent. Small amount of fluid is seen within the  central pelvis. No enhancing rind surrounding this fluid  collection. Small amount of fluid is seen tracking around the  liver with small to moderate amount of fluid track around the  spleen and extending into left paracolic gutter. No  lymphadenopathy in the abdomen or the pelvis. Imaging of the  hollow viscera reveals mild ascending  colon edematous wall  thickening with surrounding mild fatty stranding. Otherwise  hollow viscera is unremarkable. The appendix is identified  appears normal. No acute osseous abnormality. Old T8 mild  anterior vertebral body wedge compression fracture with loss of  anterior vertebral body height by 25%.      Impression:       1. Posterior segment right lobe of liver small mass lesion which  has imaging characteristics most  consistent with benign  hemangioma.  2. Small amount of free fluid tracking around the liver. Small to  moderate amount of free fluid tracking around the spleen and into  left paracolic gutter. Additional small fluid collection within  the lower central pelvis.  These fluid collections appear to  represent ascites. No definite changes to suggest inflammatory  changes and/or abscesses at this time.  3. Ascending colon mild edematous wall thickening and surrounding  mild fatty stranding. These findings would be suspicious for  changes from early mild colitis versus less likely ischemic  changes..  4.Old T8 mild anterior vertebral body wedge compression fracture  with loss of anterior vertebral body height by 25%..    Electronically signed by:  Prakash Jang MD  2/18/2019 3:47 PM CST  Workstation: [id number]          I reviewed the patient's new clinical results.    I reviewed the patient's new imaging results and agree with the interpretation.     ASSESSMENT/PLAN:   ASSESSMENT: Patient with colitis on CT scan possibly a chronic colitis as patient has had symptoms for the past 6 months.  Much less likely would be an ischemic colitis it is possible this could be just an infectious colitis.  Patient with a mildly elevated lactic acid patient is currently receiving IV hydration.    PLAN: #1 continue IV hydration.  Continue IV antibiotics.  Patient should remain n.p.o. we will schedule patient for colonoscopy to be done tomorrow with biopsy of the area of inflammation.  The risks, benefits, and alternatives of this procedure have been discussed with the patient or the responsible party. The patient understands and agrees to proceed.         Virgil Aiken MD  02/19/19  4:46 PM         This document has been electronically signed by Virgil Aiken MD on February 19, 2019 4:46 PM

## 2019-02-20 ENCOUNTER — ANESTHESIA (OUTPATIENT)
Dept: GASTROENTEROLOGY | Facility: HOSPITAL | Age: 39
End: 2019-02-20

## 2019-02-20 ENCOUNTER — ANESTHESIA EVENT (OUTPATIENT)
Dept: GASTROENTEROLOGY | Facility: HOSPITAL | Age: 39
End: 2019-02-20

## 2019-02-20 ENCOUNTER — APPOINTMENT (OUTPATIENT)
Dept: PHYSICAL THERAPY | Facility: HOSPITAL | Age: 39
End: 2019-02-20

## 2019-02-20 LAB
ADV 40+41 DNA STL QL NAA+NON-PROBE: NOT DETECTED
ALBUMIN SERPL-MCNC: 3 G/DL (ref 3.4–4.8)
ALBUMIN/GLOB SERPL: 1.3 G/DL (ref 1.1–1.8)
ALP SERPL-CCNC: 63 U/L (ref 38–126)
ALT SERPL W P-5'-P-CCNC: 23 U/L (ref 9–52)
ANION GAP SERPL CALCULATED.3IONS-SCNC: 3 MMOL/L (ref 5–15)
AST SERPL-CCNC: 16 U/L (ref 14–36)
ASTRO TYP 1-8 RNA STL QL NAA+NON-PROBE: NOT DETECTED
BASOPHILS # BLD AUTO: 0.02 10*3/MM3 (ref 0–0.2)
BASOPHILS NFR BLD AUTO: 0.4 % (ref 0–1.5)
BILIRUB SERPL-MCNC: 0.1 MG/DL (ref 0.2–1.3)
BUN BLD-MCNC: 9 MG/DL (ref 7–21)
BUN/CREAT SERPL: 11 (ref 7–25)
C CAYETANENSIS DNA STL QL NAA+NON-PROBE: NOT DETECTED
C DIFF TOX GENS STL QL NAA+PROBE: NOT DETECTED
CALCIUM SPEC-SCNC: 8.6 MG/DL (ref 8.4–10.2)
CAMPY SP DNA.DIARRHEA STL QL NAA+PROBE: NOT DETECTED
CHLORIDE SERPL-SCNC: 108 MMOL/L (ref 95–110)
CO2 SERPL-SCNC: 25 MMOL/L (ref 22–31)
CREAT BLD-MCNC: 0.82 MG/DL (ref 0.5–1)
CRYPTOSP STL CULT: NOT DETECTED
DEPRECATED RDW RBC AUTO: 43.3 FL (ref 37–54)
E COLI DNA SPEC QL NAA+PROBE: NOT DETECTED
E HISTOLYT AG STL-ACNC: NOT DETECTED
EAEC PAA PLAS AGGR+AATA ST NAA+NON-PRB: NOT DETECTED
EC STX1 + STX2 GENES STL NAA+PROBE: NOT DETECTED
EOSINOPHIL # BLD AUTO: 0.13 10*3/MM3 (ref 0–0.4)
EOSINOPHIL NFR BLD AUTO: 2.6 % (ref 0.3–6.2)
EPEC EAE GENE STL QL NAA+NON-PROBE: NOT DETECTED
ERYTHROCYTE [DISTWIDTH] IN BLOOD BY AUTOMATED COUNT: 13.1 % (ref 12.3–15.4)
ETEC LTA+ST1A+ST1B TOX ST NAA+NON-PROBE: NOT DETECTED
G LAMBLIA DNA SPEC QL NAA+PROBE: NOT DETECTED
GFR SERPL CREATININE-BSD FRML MDRD: 78 ML/MIN/1.73 (ref 64–149)
GLOBULIN UR ELPH-MCNC: 2.3 GM/DL (ref 2.3–3.5)
GLUCOSE BLD-MCNC: 81 MG/DL (ref 60–100)
HCT VFR BLD AUTO: 36.5 % (ref 34–46.6)
HGB BLD-MCNC: 11.9 G/DL (ref 12–15.9)
IMM GRANULOCYTES # BLD AUTO: 0.07 10*3/MM3 (ref 0–0.05)
IMM GRANULOCYTES NFR BLD AUTO: 1.4 % (ref 0–0.5)
LYMPHOCYTES # BLD AUTO: 1.57 10*3/MM3 (ref 0.7–3.1)
LYMPHOCYTES NFR BLD AUTO: 30.8 % (ref 19.6–45.3)
MCH RBC QN AUTO: 29.4 PG (ref 26.6–33)
MCHC RBC AUTO-ENTMCNC: 32.6 G/DL (ref 31.5–35.7)
MCV RBC AUTO: 90.1 FL (ref 79–97)
MONOCYTES # BLD AUTO: 0.31 10*3/MM3 (ref 0.1–0.9)
MONOCYTES NFR BLD AUTO: 6.1 % (ref 5–12)
NEUTROPHILS # BLD AUTO: 2.99 10*3/MM3 (ref 1.4–7)
NEUTROPHILS NFR BLD AUTO: 58.7 % (ref 42.7–76)
NOROVIRUS GI+II RNA STL QL NAA+NON-PROBE: NOT DETECTED
NRBC BLD AUTO-RTO: 0 /100 WBC (ref 0–0)
P SHIGELLOIDES DNA STL QL NAA+NON-PROBE: NOT DETECTED
PLATELET # BLD AUTO: 204 10*3/MM3 (ref 140–450)
PMV BLD AUTO: 9.3 FL (ref 6–12)
POTASSIUM BLD-SCNC: 3.9 MMOL/L (ref 3.5–5.1)
PROT SERPL-MCNC: 5.3 G/DL (ref 6.3–8.6)
RBC # BLD AUTO: 4.05 10*6/MM3 (ref 3.77–5.28)
RV RNA STL NAA+PROBE: NOT DETECTED
SALMONELLA DNA SPEC QL NAA+PROBE: NOT DETECTED
SAPO I+II+IV+V RNA STL QL NAA+NON-PROBE: NOT DETECTED
SHIGELLA SP+EIEC IPAH STL QL NAA+PROBE: NOT DETECTED
SODIUM BLD-SCNC: 136 MMOL/L (ref 137–145)
V CHOLERAE DNA SPEC QL NAA+PROBE: NOT DETECTED
VIBRIO DNA SPEC NAA+PROBE: NOT DETECTED
WBC NRBC COR # BLD: 5.09 10*3/MM3 (ref 3.4–10.8)
WHOLE BLOOD HOLD SPECIMEN: NORMAL
YERSINIA STL CULT: NOT DETECTED

## 2019-02-20 PROCEDURE — 96361 HYDRATE IV INFUSION ADD-ON: CPT

## 2019-02-20 PROCEDURE — 25010000002 KETOROLAC TROMETHAMINE PER 15 MG: Performed by: INTERNAL MEDICINE

## 2019-02-20 PROCEDURE — 88305 TISSUE EXAM BY PATHOLOGIST: CPT | Performed by: PATHOLOGY

## 2019-02-20 PROCEDURE — 85025 COMPLETE CBC W/AUTO DIFF WBC: CPT | Performed by: HOSPITALIST

## 2019-02-20 PROCEDURE — G0378 HOSPITAL OBSERVATION PER HR: HCPCS

## 2019-02-20 PROCEDURE — 87507 IADNA-DNA/RNA PROBE TQ 12-25: CPT | Performed by: INTERNAL MEDICINE

## 2019-02-20 PROCEDURE — 25010000002 LEVOFLOXACIN PER 250 MG: Performed by: INTERNAL MEDICINE

## 2019-02-20 PROCEDURE — 96366 THER/PROPH/DIAG IV INF ADDON: CPT

## 2019-02-20 PROCEDURE — 80053 COMPREHEN METABOLIC PANEL: CPT | Performed by: INTERNAL MEDICINE

## 2019-02-20 PROCEDURE — 45380 COLONOSCOPY AND BIOPSY: CPT | Performed by: INTERNAL MEDICINE

## 2019-02-20 PROCEDURE — 25010000002 PROPOFOL 10 MG/ML EMULSION: Performed by: NURSE ANESTHETIST, CERTIFIED REGISTERED

## 2019-02-20 PROCEDURE — 96376 TX/PRO/DX INJ SAME DRUG ADON: CPT

## 2019-02-20 PROCEDURE — 25010000002 HEPARIN (PORCINE) PER 1000 UNITS: Performed by: INTERNAL MEDICINE

## 2019-02-20 PROCEDURE — 88305 TISSUE EXAM BY PATHOLOGIST: CPT | Performed by: INTERNAL MEDICINE

## 2019-02-20 PROCEDURE — 96372 THER/PROPH/DIAG INJ SC/IM: CPT

## 2019-02-20 RX ORDER — LIDOCAINE HYDROCHLORIDE 20 MG/ML
INJECTION, SOLUTION INFILTRATION; PERINEURAL AS NEEDED
Status: DISCONTINUED | OUTPATIENT
Start: 2019-02-20 | End: 2019-02-20 | Stop reason: SURG

## 2019-02-20 RX ORDER — DEXTROSE AND SODIUM CHLORIDE 5; .45 G/100ML; G/100ML
30 INJECTION, SOLUTION INTRAVENOUS CONTINUOUS PRN
Status: DISCONTINUED | OUTPATIENT
Start: 2019-02-20 | End: 2019-02-21 | Stop reason: HOSPADM

## 2019-02-20 RX ORDER — SODIUM CHLORIDE 9 MG/ML
INJECTION, SOLUTION INTRAVENOUS CONTINUOUS PRN
Status: DISCONTINUED | OUTPATIENT
Start: 2019-02-20 | End: 2019-02-20 | Stop reason: SURG

## 2019-02-20 RX ORDER — BUPRENORPHINE HYDROCHLORIDE AND NALOXONE HYDROCHLORIDE DIHYDRATE 8; 2 MG/1; MG/1
1 TABLET SUBLINGUAL 2 TIMES DAILY
Status: DISCONTINUED | OUTPATIENT
Start: 2019-02-20 | End: 2019-02-21 | Stop reason: HOSPADM

## 2019-02-20 RX ORDER — PROPOFOL 10 MG/ML
VIAL (ML) INTRAVENOUS AS NEEDED
Status: DISCONTINUED | OUTPATIENT
Start: 2019-02-20 | End: 2019-02-20 | Stop reason: SURG

## 2019-02-20 RX ORDER — ONDANSETRON 2 MG/ML
4 INJECTION INTRAMUSCULAR; INTRAVENOUS ONCE AS NEEDED
Status: DISCONTINUED | OUTPATIENT
Start: 2019-02-20 | End: 2019-02-20

## 2019-02-20 RX ADMIN — PROPOFOL 20 MG: 10 INJECTION, EMULSION INTRAVENOUS at 16:48

## 2019-02-20 RX ADMIN — SODIUM CHLORIDE: 9 INJECTION, SOLUTION INTRAVENOUS at 16:37

## 2019-02-20 RX ADMIN — SODIUM CHLORIDE, PRESERVATIVE FREE 3 ML: 5 INJECTION INTRAVENOUS at 08:35

## 2019-02-20 RX ADMIN — HEPARIN SODIUM 5000 UNITS: 5000 INJECTION INTRAVENOUS; SUBCUTANEOUS at 22:03

## 2019-02-20 RX ADMIN — METRONIDAZOLE 500 MG: 500 INJECTION, SOLUTION INTRAVENOUS at 02:59

## 2019-02-20 RX ADMIN — BUPRENORPHINE HYDROCHLORIDE AND NALOXONE HYDROCHLORIDE DIHYDRATE 1 TABLET: 8; 2 TABLET SUBLINGUAL at 08:35

## 2019-02-20 RX ADMIN — PROPOFOL 20 MG: 10 INJECTION, EMULSION INTRAVENOUS at 16:52

## 2019-02-20 RX ADMIN — LEVOFLOXACIN 500 MG: 5 INJECTION, SOLUTION INTRAVENOUS at 19:58

## 2019-02-20 RX ADMIN — GABAPENTIN 800 MG: 400 CAPSULE ORAL at 22:03

## 2019-02-20 RX ADMIN — ONDANSETRON 4 MG: 4 TABLET, FILM COATED ORAL at 18:16

## 2019-02-20 RX ADMIN — SODIUM CHLORIDE 125 ML/HR: 9 INJECTION, SOLUTION INTRAVENOUS at 22:07

## 2019-02-20 RX ADMIN — METRONIDAZOLE 500 MG: 500 INJECTION, SOLUTION INTRAVENOUS at 18:17

## 2019-02-20 RX ADMIN — PROPOFOL 20 MG: 10 INJECTION, EMULSION INTRAVENOUS at 16:45

## 2019-02-20 RX ADMIN — PROPOFOL 20 MG: 10 INJECTION, EMULSION INTRAVENOUS at 16:50

## 2019-02-20 RX ADMIN — PROPOFOL 20 MG: 10 INJECTION, EMULSION INTRAVENOUS at 16:46

## 2019-02-20 RX ADMIN — LEVETIRACETAM 500 MG: 500 TABLET, FILM COATED ORAL at 20:00

## 2019-02-20 RX ADMIN — LIDOCAINE HYDROCHLORIDE 80 MG: 20 INJECTION, SOLUTION INFILTRATION; PERINEURAL at 16:44

## 2019-02-20 RX ADMIN — SODIUM CHLORIDE, PRESERVATIVE FREE 3 ML: 5 INJECTION INTRAVENOUS at 20:00

## 2019-02-20 RX ADMIN — PROPOFOL 80 MG: 10 INJECTION, EMULSION INTRAVENOUS at 16:44

## 2019-02-20 RX ADMIN — SODIUM CHLORIDE 125 ML/HR: 9 INJECTION, SOLUTION INTRAVENOUS at 06:31

## 2019-02-20 RX ADMIN — FAMOTIDINE 20 MG: 10 INJECTION INTRAVENOUS at 20:00

## 2019-02-20 RX ADMIN — KETOROLAC TROMETHAMINE 30 MG: 30 INJECTION, SOLUTION INTRAMUSCULAR at 06:33

## 2019-02-20 RX ADMIN — GABAPENTIN 800 MG: 400 CAPSULE ORAL at 13:42

## 2019-02-20 RX ADMIN — KETOROLAC TROMETHAMINE 30 MG: 30 INJECTION, SOLUTION INTRAMUSCULAR at 01:05

## 2019-02-20 RX ADMIN — PROPOFOL 20 MG: 10 INJECTION, EMULSION INTRAVENOUS at 16:47

## 2019-02-20 RX ADMIN — METRONIDAZOLE 500 MG: 500 INJECTION, SOLUTION INTRAVENOUS at 11:51

## 2019-02-20 RX ADMIN — BUPRENORPHINE HYDROCHLORIDE AND NALOXONE HYDROCHLORIDE DIHYDRATE 1 TABLET: 8; 2 TABLET SUBLINGUAL at 20:00

## 2019-02-20 NOTE — ANESTHESIA POSTPROCEDURE EVALUATION
Patient: Tete Chen    Procedure Summary     Date:  02/20/19 Room / Location:  [facility] ENDOSCOPY 1 / [facility] ENDOSCOPY    Anesthesia Start:  1637 Anesthesia Stop:  1655    Procedure:  COLONOSCOPY (N/A ) Diagnosis:       Colitis      (Colitis [K52.9])    Surgeon:  Virgil Aiken MD Provider:  Bakari Yuen CRNA    Anesthesia Type:  MAC ASA Status:  3          Anesthesia Type: MAC  Last vitals  BP   127/76 (02/20/19 1559)   Temp   98.6 °F (37 °C) (02/20/19 1559)   Pulse   80 (02/20/19 1559)   Resp   18 (02/20/19 1559)     SpO2   100 % (02/20/19 1559)     Post Anesthesia Care and Evaluation    Patient location during evaluation: PACU  Level of consciousness: awake and awake and alert  Pain score: 0  Pain management: adequate  Airway patency: patent  Anesthetic complications: No anesthetic complications  PONV Status: none  Cardiovascular status: acceptable and hemodynamically stable  Respiratory status: acceptable and spontaneous ventilation  Hydration status: acceptable

## 2019-02-20 NOTE — NURSING NOTE
Called to get report from JOHANNA Bonilla. Informed that she is tied up with another pt's situation. I told Nickolas that I got most of the info I needed about the pt from her chart. I told her to let Irene know that she didn't have to call me back unless there is something that I needed to know about the pt.

## 2019-02-20 NOTE — ANESTHESIA PREPROCEDURE EVALUATION
Anesthesia Evaluation     Patient summary reviewed   NPO Solid Status: > 8 hours  NPO Liquid Status: > 2 hours           Airway   Mallampati: II  TM distance: >3 FB  Possible difficult intubation  Dental      Pulmonary    (+) a smoker Current, decreased breath sounds,   Cardiovascular - normal exam    (+) hypertension well controlled 2 medications or greater, hyperlipidemia,       Neuro/Psych  (+) seizures, numbness, psychiatric history Anxiety and Depression,     GI/Hepatic/Renal/Endo    (+) obesity, morbid obesity, GERD,      Musculoskeletal     (+) chronic pain,   Abdominal   (+) obese,    Substance History      OB/GYN          Other                        Anesthesia Plan    ASA 3     MAC     intravenous induction   Anesthetic plan, all risks, benefits, and alternatives have been provided, discussed and informed consent has been obtained with: patient.

## 2019-02-20 NOTE — PLAN OF CARE
Problem: Bowel Disease, Inflammatory (Adult)  Goal: Signs and Symptoms of Listed Potential Problems Will be Absent, Minimized or Managed (Bowel Disease, Inflammatory)  Outcome: Ongoing (interventions implemented as appropriate)      Problem: Sepsis/Septic Shock (Adult)  Goal: Signs and Symptoms of Listed Potential Problems Will be Absent, Minimized or Managed (Sepsis/Septic Shock)  Outcome: Ongoing (interventions implemented as appropriate)      Problem: Fluid Volume Deficit (Adult)  Goal: Identify Related Risk Factors and Signs and Symptoms  Outcome: Outcome(s) achieved Date Met: 02/20/19    Goal: Optimal Fluid Balance  Outcome: Ongoing (interventions implemented as appropriate)      Problem: Patient Care Overview  Goal: Plan of Care Review  Outcome: Ongoing (interventions implemented as appropriate)   02/20/19 0033   Coping/Psychosocial   Plan of Care Reviewed With patient   Plan of Care Review   Progress no change   OTHER   Outcome Summary No new complaints at this time; will continue to monitor.

## 2019-02-20 NOTE — PROGRESS NOTES
[facility] Medicine Services  INPATIENT PROGRESS NOTE    Length of Stay: 0  Date of Admission: 2/18/2019  Primary Care Physician: Alfred Stahl APRN    Subjective   Chief Complaint: Abdominal pain, nausea,  HPI: Patient denies any symptoms today except mild epigastric pain.  Patient has been n.p.o.  Colonoscopy today.    Review of Systems   Respiratory: Negative for shortness of breath.    Cardiovascular: Negative for chest pain.   Gastrointestinal: Positive for abdominal pain.        All pertinent negatives and positives are as above. All other systems have been reviewed and are negative unless otherwise stated.     Objective    Temp:  [96.4 °F (35.8 °C)-98.6 °F (37 °C)] 98.6 °F (37 °C)  Heart Rate:  [69-95] 80  Resp:  [18] 18  BP: (104-127)/(61-90) 127/76    Physical Exam   Constitutional: She appears well-developed.   HENT:   Head: Normocephalic and atraumatic.   Eyes: Pupils are equal, round, and reactive to light.   Neck: Normal range of motion.   Cardiovascular: Normal rate.   Pulmonary/Chest: She has decreased breath sounds. She has no wheezes.   Abdominal: Soft. There is no tenderness.   Musculoskeletal: She exhibits no edema.   Neurological: She is alert.   Skin: Skin is warm and dry.   Psychiatric: She has a normal mood and affect.           Results Review:  I have reviewed the labs, radiology results, and diagnostic studies.    Laboratory Data:   Results from last 7 days   Lab Units 02/20/19  0625 02/19/19  0621 02/18/19  1350   SODIUM mmol/L 136* 135* 138   POTASSIUM mmol/L 3.9 5.1 4.0   CHLORIDE mmol/L 108 105 99   CO2 mmol/L 25.0 23.0 28.0   BUN mg/dL 9 12 16   CREATININE mg/dL 0.82 0.79 1.00   GLUCOSE mg/dL 81 115* 110*   CALCIUM mg/dL 8.6 8.9 10.0   BILIRUBIN mg/dL 0.1* 0.3 0.6   ALK PHOS U/L 63 68 110   ALT (SGPT) U/L 23 18 25   AST (SGOT) U/L 16 15 21   ANION GAP mmol/L 3.0* 7.0 11.0     Estimated Creatinine Clearance: 94.2 mL/min (by C-G formula based  on SCr of 0.82 mg/dL).          Results from last 7 days   Lab Units 02/20/19  0957 02/18/19  2346 02/18/19  1231   WBC 10*3/mm3 5.09 12.16* 18.46*   HEMOGLOBIN g/dL 11.9* 14.8 19.7*   HEMATOCRIT % 36.5 45.8 59.4*   PLATELETS 10*3/mm3 204 300 377           Culture Data:   Blood Culture   Date Value Ref Range Status   02/18/2019 No growth at 2 days  Preliminary   02/18/2019 No growth at 2 days  Preliminary     No results found for: URINECX  No results found for: RESPCX  No results found for: WOUNDCX  No results found for: STOOLCX  No components found for: BODYFLD    Radiology Data:   Imaging Results (last 24 hours)     ** No results found for the last 24 hours. **          I have reviewed the patient's current medications.     Assessment/Plan     Active Hospital Problems    Diagnosis   • Colitis       Plan:  1.  Abdominal pain: Improved.  Monitor  2.  Possible colitis: New, improved.  Likely chronic colitis..  Continue Levaquin and Flagyl, IV fluids.  Colonoscopy today  3.  Lactic acidosis: Improved.  Monitor      Discharge Planning: I expect patient to be discharged to home in 1 days.    Signed     Dr Jonas Quezada,    2/20/2019  4:02 PM

## 2019-02-21 ENCOUNTER — APPOINTMENT (OUTPATIENT)
Dept: PHYSICAL THERAPY | Facility: HOSPITAL | Age: 39
End: 2019-02-21

## 2019-02-21 VITALS
HEIGHT: 60 IN | SYSTOLIC BLOOD PRESSURE: 119 MMHG | DIASTOLIC BLOOD PRESSURE: 88 MMHG | HEART RATE: 74 BPM | WEIGHT: 202.1 LBS | BODY MASS INDEX: 39.68 KG/M2 | TEMPERATURE: 98.1 F | RESPIRATION RATE: 18 BRPM | OXYGEN SATURATION: 98 %

## 2019-02-21 LAB
ALBUMIN SERPL-MCNC: 2.7 G/DL (ref 3.4–4.8)
ALBUMIN/GLOB SERPL: 1.2 G/DL (ref 1.1–1.8)
ALP SERPL-CCNC: 53 U/L (ref 38–126)
ALT SERPL W P-5'-P-CCNC: 25 U/L (ref 9–52)
ANION GAP SERPL CALCULATED.3IONS-SCNC: 3 MMOL/L (ref 5–15)
AST SERPL-CCNC: 14 U/L (ref 14–36)
BASOPHILS # BLD AUTO: 0.03 10*3/MM3 (ref 0–0.2)
BASOPHILS NFR BLD AUTO: 0.7 % (ref 0–1.5)
BILIRUB SERPL-MCNC: <0.1 MG/DL (ref 0.2–1.3)
BUN BLD-MCNC: 9 MG/DL (ref 7–21)
BUN/CREAT SERPL: 13.4 (ref 7–25)
CALCIUM SPEC-SCNC: 7.6 MG/DL (ref 8.4–10.2)
CHLORIDE SERPL-SCNC: 108 MMOL/L (ref 95–110)
CO2 SERPL-SCNC: 24 MMOL/L (ref 22–31)
CREAT BLD-MCNC: 0.67 MG/DL (ref 0.5–1)
DEPRECATED RDW RBC AUTO: 44.3 FL (ref 37–54)
EOSINOPHIL # BLD AUTO: 0.13 10*3/MM3 (ref 0–0.4)
EOSINOPHIL NFR BLD AUTO: 3 % (ref 0.3–6.2)
ERYTHROCYTE [DISTWIDTH] IN BLOOD BY AUTOMATED COUNT: 13.2 % (ref 12.3–15.4)
GFR SERPL CREATININE-BSD FRML MDRD: 99 ML/MIN/1.73 (ref 64–149)
GLOBULIN UR ELPH-MCNC: 2.3 GM/DL (ref 2.3–3.5)
GLUCOSE BLD-MCNC: 103 MG/DL (ref 60–100)
HCT VFR BLD AUTO: 34.1 % (ref 34–46.6)
HGB BLD-MCNC: 11.2 G/DL (ref 12–15.9)
IMM GRANULOCYTES # BLD AUTO: 0.05 10*3/MM3 (ref 0–0.05)
IMM GRANULOCYTES NFR BLD AUTO: 1.1 % (ref 0–0.5)
LYMPHOCYTES # BLD AUTO: 2.05 10*3/MM3 (ref 0.7–3.1)
LYMPHOCYTES NFR BLD AUTO: 46.6 % (ref 19.6–45.3)
MCH RBC QN AUTO: 30.1 PG (ref 26.6–33)
MCHC RBC AUTO-ENTMCNC: 32.8 G/DL (ref 31.5–35.7)
MCV RBC AUTO: 91.7 FL (ref 79–97)
MONOCYTES # BLD AUTO: 0.31 10*3/MM3 (ref 0.1–0.9)
MONOCYTES NFR BLD AUTO: 7 % (ref 5–12)
NEUTROPHILS # BLD AUTO: 1.83 10*3/MM3 (ref 1.4–7)
NEUTROPHILS NFR BLD AUTO: 41.6 % (ref 42.7–76)
NRBC BLD AUTO-RTO: 0 /100 WBC (ref 0–0)
PLATELET # BLD AUTO: 224 10*3/MM3 (ref 140–450)
PMV BLD AUTO: 9.1 FL (ref 6–12)
POTASSIUM BLD-SCNC: 3.5 MMOL/L (ref 3.5–5.1)
PROT SERPL-MCNC: 5 G/DL (ref 6.3–8.6)
RBC # BLD AUTO: 3.72 10*6/MM3 (ref 3.77–5.28)
SODIUM BLD-SCNC: 135 MMOL/L (ref 137–145)
WBC NRBC COR # BLD: 4.4 10*3/MM3 (ref 3.4–10.8)

## 2019-02-21 PROCEDURE — 99214 OFFICE O/P EST MOD 30 MIN: CPT | Performed by: INTERNAL MEDICINE

## 2019-02-21 PROCEDURE — 85025 COMPLETE CBC W/AUTO DIFF WBC: CPT | Performed by: HOSPITALIST

## 2019-02-21 PROCEDURE — 96372 THER/PROPH/DIAG INJ SC/IM: CPT

## 2019-02-21 PROCEDURE — 96366 THER/PROPH/DIAG IV INF ADDON: CPT

## 2019-02-21 PROCEDURE — 80053 COMPREHEN METABOLIC PANEL: CPT | Performed by: INTERNAL MEDICINE

## 2019-02-21 PROCEDURE — 96376 TX/PRO/DX INJ SAME DRUG ADON: CPT

## 2019-02-21 PROCEDURE — 25010000002 HEPARIN (PORCINE) PER 1000 UNITS: Performed by: INTERNAL MEDICINE

## 2019-02-21 PROCEDURE — G0378 HOSPITAL OBSERVATION PER HR: HCPCS

## 2019-02-21 PROCEDURE — 96361 HYDRATE IV INFUSION ADD-ON: CPT

## 2019-02-21 RX ORDER — METRONIDAZOLE 500 MG/1
500 TABLET ORAL 3 TIMES DAILY
Qty: 21 TABLET | Refills: 0 | Status: SHIPPED | OUTPATIENT
Start: 2019-02-21 | End: 2019-02-28

## 2019-02-21 RX ORDER — LEVOFLOXACIN 750 MG/1
750 TABLET ORAL DAILY
Qty: 7 TABLET | Refills: 0 | Status: SHIPPED | OUTPATIENT
Start: 2019-02-21 | End: 2019-03-15

## 2019-02-21 RX ORDER — LACTOBACILLUS RHAMNOSUS GG 10B CELL
1 CAPSULE ORAL 2 TIMES DAILY
Qty: 14 CAPSULE | Refills: 0 | Status: SHIPPED | OUTPATIENT
Start: 2019-02-21

## 2019-02-21 RX ADMIN — HEPARIN SODIUM 5000 UNITS: 5000 INJECTION INTRAVENOUS; SUBCUTANEOUS at 05:58

## 2019-02-21 RX ADMIN — LEVETIRACETAM 500 MG: 500 TABLET, FILM COATED ORAL at 08:45

## 2019-02-21 RX ADMIN — ONDANSETRON 4 MG: 4 TABLET, FILM COATED ORAL at 08:51

## 2019-02-21 RX ADMIN — BUPRENORPHINE HYDROCHLORIDE AND NALOXONE HYDROCHLORIDE DIHYDRATE 1 TABLET: 8; 2 TABLET SUBLINGUAL at 08:45

## 2019-02-21 RX ADMIN — GABAPENTIN 800 MG: 400 CAPSULE ORAL at 05:59

## 2019-02-21 RX ADMIN — SODIUM CHLORIDE 125 ML/HR: 9 INJECTION, SOLUTION INTRAVENOUS at 05:59

## 2019-02-21 RX ADMIN — FAMOTIDINE 20 MG: 10 INJECTION INTRAVENOUS at 08:46

## 2019-02-21 RX ADMIN — METRONIDAZOLE 500 MG: 500 INJECTION, SOLUTION INTRAVENOUS at 03:31

## 2019-02-21 NOTE — DISCHARGE SUMMARY
[facility] Medicine Services  DISCHARGE SUMMARY       Date of Admission: 2/18/2019  Date of Discharge:  2/21/2019  Primary Care Physician: Alfred Stahl APRN    Presenting Problem/History of Present Illness:  Colitis [K52.9]       Final Discharge Diagnoses:  Active Hospital Problems    Diagnosis   • Colitis       Consults:   Consults     Date and Time Order Name Status Description    2/19/2019 1212 Inpatient Gastroenterology Consult Completed           Procedures Performed: Procedure(s):  COLONOSCOPY           02/20 1632 COLONOSCOPY    Pertinent Test Results:   Lab Results (most recent)     Procedure Component Value Units Date/Time    Tissue Pathology Exam [[id number]] Collected:  02/20/19 1655    Specimen:  Tissue from Large Intestine Updated:  02/21/19 0818    Comprehensive Metabolic Panel [[id number]]  (Abnormal) Collected:  02/21/19 0519    Specimen:  Blood Updated:  02/21/19 0636     Glucose 103 mg/dL      BUN 9 mg/dL      Creatinine 0.67 mg/dL      Sodium 135 mmol/L      Potassium 3.5 mmol/L      Chloride 108 mmol/L      CO2 24.0 mmol/L      Calcium 7.6 mg/dL      Total Protein 5.0 g/dL      Albumin 2.70 g/dL      ALT (SGPT) 25 U/L      AST (SGOT) 14 U/L      Alkaline Phosphatase 53 U/L      Total Bilirubin <0.1 mg/dL      eGFR Non African Amer 99 mL/min/1.73      Globulin 2.3 gm/dL      A/G Ratio 1.2 g/dL      BUN/Creatinine Ratio 13.4     Anion Gap 3.0 mmol/L     CBC & Differential [[id number]] Collected:  02/21/19 0519    Specimen:  Blood Updated:  02/21/19 0617    Narrative:       The following orders were created for panel order CBC & Differential.  Procedure                               Abnormality         Status                     ---------                               -----------         ------                     CBC Auto Differential[[id number]]        Abnormal            Final result                 Please view results for these tests on the individual  orders.    CBC Auto Differential [[id number]]  (Abnormal) Collected:  02/21/19 0519    Specimen:  Blood Updated:  02/21/19 0617     WBC 4.40 10*3/mm3      RBC 3.72 10*6/mm3      Hemoglobin 11.2 g/dL      Hematocrit 34.1 %      MCV 91.7 fL      MCH 30.1 pg      MCHC 32.8 g/dL      RDW 13.2 %      RDW-SD 44.3 fl      MPV 9.1 fL      Platelets 224 10*3/mm3      Neutrophil % 41.6 %      Lymphocyte % 46.6 %      Monocyte % 7.0 %      Eosinophil % 3.0 %      Basophil % 0.7 %      Immature Grans % 1.1 %      Neutrophils, Absolute 1.83 10*3/mm3      Lymphocytes, Absolute 2.05 10*3/mm3      Monocytes, Absolute 0.31 10*3/mm3      Eosinophils, Absolute 0.13 10*3/mm3      Basophils, Absolute 0.03 10*3/mm3      Immature Grans, Absolute 0.05 10*3/mm3      nRBC 0.0 /100 WBC     Gastrointestinal Panel, PCR - Stool, Per Rectum [[id number]]  (Normal) Collected:  02/20/19 1655    Specimen:  Stool from Per Rectum Updated:  02/20/19 1947     Campylobacter Not Detected     Clostridium difficile (toxin A/B) Not Detected     Plesiomonas shigelloides Not Detected     Salmonella Not Detected     Vibrio Not Detected     Vibrio cholerae Not Detected     Yersinia enterocolitica Not Detected     Enteroaggregative E. coli (EAEC) Not Detected     Enteropathogenic E. coli (EPEC) Not Detected     Enterotoxigenic E. coli (ETEC) lt/st Not Detected     Shiga-like toxin-producing E. coli (STEC) stx1/stx2 Not Detected     E. coli O157 Not Detected     Shigella/Enteroinvasive E. coli (EIEC) Not Detected     Cryptosporidium Not Detected     Cyclospora cayetanensis Not Detected     Entamoeba histolytica Not Detected     Giardia lamblia Not Detected     Adenovirus F40/41 Not Detected     Astrovirus Not Detected     Norovirus GI/GII Not Detected     Rotavirus A Not Detected     Sapovirus (I, II, IV or V) Not Detected    Narrative:       Testing performed by multiplex PCR system.    Blood Culture - Blood, Arm, Left [[id number]] Collected:  02/18/19 1703     Specimen:  Blood from Arm, Left Updated:  02/20/19 1400     Blood Culture No growth at 2 days    Blood Culture - Blood, Arm, Left [[id number]] Collected:  02/18/19 1350    Specimen:  Blood from Arm, Left Updated:  02/20/19 1400     Blood Culture No growth at 2 days    CBC & Differential [[id number]] Collected:  02/20/19 0957    Specimen:  Blood Updated:  02/20/19 1032    Narrative:       The following orders were created for panel order CBC & Differential.  Procedure                               Abnormality         Status                     ---------                               -----------         ------                     CBC Auto Differential[[id number]]        Abnormal            Final result                 Please view results for these tests on the individual orders.    CBC Auto Differential [[id number]]  (Abnormal) Collected:  02/20/19 0957    Specimen:  Blood Updated:  02/20/19 1032     WBC 5.09 10*3/mm3      RBC 4.05 10*6/mm3      Hemoglobin 11.9 g/dL      Comment: Results confirmed by recollection        Hematocrit 36.5 %      MCV 90.1 fL      MCH 29.4 pg      MCHC 32.6 g/dL      RDW 13.1 %      RDW-SD 43.3 fl      MPV 9.3 fL      Platelets 204 10*3/mm3      Neutrophil % 58.7 %      Lymphocyte % 30.8 %      Monocyte % 6.1 %      Eosinophil % 2.6 %      Basophil % 0.4 %      Immature Grans % 1.4 %      Neutrophils, Absolute 2.99 10*3/mm3      Lymphocytes, Absolute 1.57 10*3/mm3      Monocytes, Absolute 0.31 10*3/mm3      Eosinophils, Absolute 0.13 10*3/mm3      Basophils, Absolute 0.02 10*3/mm3      Immature Grans, Absolute 0.07 10*3/mm3      nRBC 0.0 /100 WBC     Extra Tubes [[id number]] Collected:  02/20/19 0625    Specimen:  Blood, Venous Line Updated:  02/20/19 0730    Narrative:       The following orders were created for panel order Extra Tubes.  Procedure                               Abnormality         Status                     ---------                               -----------         ------                      Lavender Top[[id number]]                                     Final result                 Please view results for these tests on the individual orders.    Lavender Top [[id number]] Collected:  02/20/19 0625    Specimen:  Blood Updated:  02/20/19 0730     Extra Tube hold for add-on     Comment: Auto resulted       Comprehensive Metabolic Panel [[id number]]  (Abnormal) Collected:  02/20/19 0625    Specimen:  Blood Updated:  02/20/19 0719     Glucose 81 mg/dL      BUN 9 mg/dL      Creatinine 0.82 mg/dL      Sodium 136 mmol/L      Potassium 3.9 mmol/L      Chloride 108 mmol/L      CO2 25.0 mmol/L      Calcium 8.6 mg/dL      Total Protein 5.3 g/dL      Albumin 3.00 g/dL      ALT (SGPT) 23 U/L      AST (SGOT) 16 U/L      Alkaline Phosphatase 63 U/L      Total Bilirubin 0.1 mg/dL      eGFR Non African Amer 78 mL/min/1.73      Globulin 2.3 gm/dL      A/G Ratio 1.3 g/dL      BUN/Creatinine Ratio 11.0     Anion Gap 3.0 mmol/L     Lactic Acid, Plasma [[id number]]  (Abnormal) Collected:  02/19/19 0918    Specimen:  Blood Updated:  02/19/19 0939     Lactate 2.1 mmol/L     Extra Tubes [[id number]] Collected:  02/19/19 0621    Specimen:  Blood, Venous Line Updated:  02/19/19 0731    Narrative:       The following orders were created for panel order Extra Tubes.  Procedure                               Abnormality         Status                     ---------                               -----------         ------                     Lavender Top[[id number]]                                     Final result                 Please view results for these tests on the individual orders.    Lavender Top [[id number]] Collected:  02/19/19 0621    Specimen:  Blood Updated:  02/19/19 0731     Extra Tube hold for add-on     Comment: Auto resulted       Lactic Acid, Reflex [[id number]]  (Abnormal) Collected:  02/18/19 1806    Specimen:  Blood Updated:  02/18/19 1849     Lactate 3.5 mmol/L     Lactic Acid, Reflex Timer (This will reflex  a repeat order 3-3:15 hours after ordered.) [[id number]] Collected:  02/18/19 1350    Specimen:  Blood Updated:  02/18/19 1745     Extra Tube Hold for add-ons.     Comment: Auto resulted.       Manual Differential [[id number]]  (Abnormal) Collected:  02/18/19 1231    Specimen:  Blood Updated:  02/18/19 1724     Neutrophil % 73.0 %      Lymphocyte % 14.0 %      Monocyte % 5.0 %      Eosinophil % 3.0 %      Bands %  4.0 %      Atypical Lymphocyte % 1.0 %      Neutrophils Absolute 14.21 10*3/mm3      Lymphocytes Absolute 2.58 10*3/mm3      Monocytes Absolute 0.92 10*3/mm3      Eosinophils Absolute 0.55 10*3/mm3      RBC Morphology Normal     WBC Morphology Normal     Platelet Morphology Normal    [place] Draw [[id number]] Collected:  02/18/19 1231    Specimen:  Blood Updated:  02/18/19 1500    Narrative:       The following orders were created for panel order [place] Draw.  Procedure                               Abnormality         Status                     ---------                               -----------         ------                     Light Blue Top[[id number]]                                   Final result               Green Top (Gel)[[id number]]                                  Final result               Lavender Top[[id number]]                                     Final result               Gold Top - SST[[id number]]                                   Final result                 Please view results for these tests on the individual orders.    Light Blue Top [[id number]] Collected:  02/18/19 1350    Specimen:  Blood Updated:  02/18/19 1500     Extra Tube hold for add-on     Comment: Auto resulted       Green Top (Gel) [[id number]] Collected:  02/18/19 1350    Specimen:  Blood Updated:  02/18/19 1500     Extra Tube Hold for add-ons.     Comment: Auto resulted.       Gold Top - SST [[id number]] Collected:  02/18/19 1350    Specimen:  Blood Updated:  02/18/19 1500     Extra Tube Hold for add-ons.     Comment: Auto  resulted.       Lactic Acid, Plasma [[id number]]  (Abnormal) Collected:  02/18/19 1350    Specimen:  Blood Updated:  02/18/19 1434     Lactate 2.6 mmol/L     Lipase [[id number]]  (Normal) Collected:  02/18/19 1350    Specimen:  Blood Updated:  02/18/19 1427     Lipase 89 U/L     Urinalysis With Culture If Indicated - Urine, Clean Catch [[id number]]  (Abnormal) Collected:  02/18/19 1408    Specimen:  Urine, Clean Catch Updated:  02/18/19 1420     Color, UA Yellow     Appearance, UA Cloudy     pH, UA 5.5     Specific Gravity, UA 1.028     Comment: Result obtained by Refractometer        Glucose, UA Negative     Ketones, UA Negative     Bilirubin, UA Small (1+)     Blood, UA Negative     Protein, UA Trace     Leuk Esterase, UA Negative     Nitrite, UA Negative     Urobilinogen, UA 0.2 E.U./dL    Narrative:       Urine microscopic not indicated.        Imaging Results (most recent)     Procedure Component Value Units Date/Time    US Liver [[id number]] Collected:  02/19/19 0750     Updated:  02/19/19 1354    Narrative:       NAME: ARNULFO CHAIREZ  PROCEDURE: US LIVER  ORDER DATE: 2/19/2019 7:50 AM CST  ACCESSION NUMBER: [id number]      Clinical History: fluid around the liver, K52.9 Noninfective  gastroenteritis and colitis, unspecified    Indication: Same as above.    Comparison: 10/12/2017-ultrasound of the abdomen and CT of the  abdomen and pelvis done on 2/18/2019 .    Technique: Gray scale ultrasound evaluation of the right upper  quadrant of the abdomen was done  along with limited color  Doppler evaluation    Findings:     The gallbladder is surgically absent    The common duct is normal in transverse diameter and measures 4.6  mm. There is no intraductal common duct calculus.    There are no focal liver lesions. The previously seen lesion in  the posterior segment of the right lobe of the liver, on the CT  of the abdomen and pelvis done on 2/18/2019, is difficult to  visualize on the current study. There is no  intrahepatic biliary  dilatation.    There is sliver of ascites fluid superior to the liver    The main portal vein is of normal caliber and shows normal  hepatopedal blood flow. The hepatic veins are patent.    The pancreas is obscured by the bowel gas.     The right kidney measures 9.8  cm in length. There is no  hydronephrosis or nephrolithiasis in the right kidney.    The visualized portion of the abdominal aorta and the inferior  vena cava are unremarkable.    There is no  documented right-sided pleural effusion.      Impression:       Impression:     There are no focal liver lesions. The previously seen lesion in  the posterior segment of the right lobe of the liver, on the CT  of the abdomen and pelvis done on 2/18/2019, is difficult to  visualize on the current study. On the CT examination this lesion  was suspected to be a hemangioma and can be further assessed and  confirmed with a dedicated IV contrast-enhanced CT of the liver,  utilizing hemangioma protocol.    There is sliver of ascites fluid superior to the liver    Electronically signed by:  Tobi Koroma MD  2/19/2019 1:53 PM CST  Workstation: [id number]-    CT Abdomen Pelvis With Contrast [[id number]] Collected:  02/18/19 1500     Updated:  02/18/19 1548    Narrative:         PROCEDURE: Ct abdomen and pelvis with contrast    REASON FOR EXAM: abdominal pain; elevated WBC    FINDINGS: Comparison study dated  November 19, 2018. Axial  computer tomography sequential imaging was performed from the  diaphragms through the symphysis pubis with IV contrast  administration. Sagittal and coronal reformates was performed.  This exam was performed according to our departmental dose  optimization program, which includes automated exposure control,  adjustment of the mA and/or KV according to patient size and/or  use of iterative reconstruction technique.      Imaging through the lung bases reveals no abnormality.    Posterior segment right lobe of liver small  stable hypodense mass  lesion which demonstrates peripheral globular enhancement. On  prior ultrasound this lesion was hyperechoic. These imaging  characteristics are most consistent with benign hemangioma which  measures 1.4 x 1.2 x 1.7 cm. The liver is otherwise normal. The  gallbladder surgically absent. The biliary system appears within  normal limits status post cholecystectomy. The pancreas is  normal. The spleen is normal. Bilateral adrenal glands are  normal. Right kidney and ureter are normal. Left kidney and  ureter are normal. The bladder is normal. The uterus is  surgically absent. Small amount of fluid is seen within the  central pelvis. No enhancing rind surrounding this fluid  collection. Small amount of fluid is seen tracking around the  liver with small to moderate amount of fluid track around the  spleen and extending into left paracolic gutter. No  lymphadenopathy in the abdomen or the pelvis. Imaging of the  hollow viscera reveals mild ascending  colon edematous wall  thickening with surrounding mild fatty stranding. Otherwise  hollow viscera is unremarkable. The appendix is identified  appears normal. No acute osseous abnormality. Old T8 mild  anterior vertebral body wedge compression fracture with loss of  anterior vertebral body height by 25%.      Impression:       1. Posterior segment right lobe of liver small mass lesion which  has imaging characteristics most consistent with benign  hemangioma.  2. Small amount of free fluid tracking around the liver. Small to  moderate amount of free fluid tracking around the spleen and into  left paracolic gutter. Additional small fluid collection within  the lower central pelvis.  These fluid collections appear to  represent ascites. No definite changes to suggest inflammatory  changes and/or abscesses at this time.  3. Ascending colon mild edematous wall thickening and surrounding  mild fatty stranding. These findings would be suspicious for  changes  "from early mild colitis versus less likely ischemic  changes..  4.Old T8 mild anterior vertebral body wedge compression fracture  with loss of anterior vertebral body height by 25%..    Electronically signed by:  Prakash Jang MD  2/18/2019 3:47 PM CST  Workstation: [id number]          Chief Complaint on Day of Discharge: None.  Denies any abdominal pain, nausea, vomiting, shortness of breath, diarrhea or any other concerning symptoms.    Hospital Course:  The patient is a 38 y.o. female who presented to [facility] with the following:    From H&P:\"38 year old female patient who came to the ED due to worsening abdominal pain, associated with diarrhea, nausea and vomiting since yesterday. As per patient she is being treated for bronchitis with omnicef, one more day to go. Her bronchitis is better but yesterday she developed a severe abdominal pain, this was follow by N/V and one episode of diarrhea. She was seen by her PCP, bentyl was given but this morning she could not hold the pain anymore, then came to the ED for further evaluation.  She described the pain as severe, generalized, on and off, 7/10 in severity , non radiated, not aggravating or exacerbating factors. Vomiting are non bilious non bloody. Diarrhea  Is watery and foul smelling.        At the ED labs reported lactic acidosis, and leukocytosis. CT abdomen reported possible liver hemangioma, Small amount of free fluid tracking around the liver. Small to moderate amount of free fluid tracking around the spleen and into  left paracolic gutter and Ascending colon mild edematous wall thickening and surrounding mild fatty stranding. \"    Hospital course: Patient was admitted for possible colitis.  Improved on IV fluids, antibiotics.  GI was consulted and had colonoscopy with colitis.  Biopsy results pending.  Patient's symptoms have resolved at this time.  GI okay with discharge.  Tolerating p.o.  and wants to go home.  Patient was discharged home on " "antibiotics in stable condition..      Condition on Discharge:  Fair     Physical Exam on Discharge:  /88 (BP Location: Left arm, Patient Position: Sitting)   Pulse 74   Temp 98.1 °F (36.7 °C) (Oral)   Resp 18   Ht 152.4 cm (60\")   Wt 91.7 kg (202 lb 1.6 oz)   SpO2 98%   BMI 39.47 kg/m²   Physical Exam   Constitutional: She appears well-developed.   HENT:   Head: Normocephalic and atraumatic.   Eyes: Pupils are equal, round, and reactive to light.   Neck: Normal range of motion.   Cardiovascular: Normal rate.   Pulmonary/Chest: She has decreased breath sounds. She has no wheezes.   Abdominal: Soft. There is no tenderness.   Musculoskeletal: She exhibits no edema.   Neurological: She is alert.   Skin: Skin is warm and dry.   Psychiatric: She has a normal mood and affect.         Discharge Disposition:  Home or Self Care    Discharge Medications:     Discharge Medications      New Medications      Instructions Start Date   levoFLOXacin 750 MG tablet  Commonly known as:  LEVAQUIN   750 mg, Oral, Daily      metroNIDAZOLE 500 MG tablet  Commonly known as:  FLAGYL   500 mg, Oral, 3 Times Daily      probiotic capsule capsule   1 capsule, Oral, 2 Times Daily         Continue These Medications      Instructions Start Date   acetaminophen 325 MG tablet  Commonly known as:  TYLENOL   650 mg, Oral, Every 4 Hours PRN      ALPRAZolam 1 MG tablet  Commonly known as:  XANAX   1 mg, Oral      buprenorphine-naloxone 8-2 MG per SL tablet  Commonly known as:  SUBOXONE   1 tablet, Sublingual, Daily      cefdinir 300 MG capsule  Commonly known as:  OMNICEF   TAKE ONE CAPSULE BY MOUTH TWICE DAILY FOR SEVEN DAYS      dicyclomine 10 MG capsule  Commonly known as:  BENTYL   10 mg, Oral, 3 Times Daily PRN      gabapentin 800 MG tablet  Commonly known as:  NEURONTIN   800 mg, Oral, 3 Times Daily      hydrochlorothiazide 25 MG tablet  Commonly known as:  HYDRODIURIL   25 mg, Oral, Daily      levETIRAcetam 500 MG tablet  Commonly " known as:  KEPPRA   500 mg, Oral, 2 Times Daily      lisinopril 40 MG tablet  Commonly known as:  PRINIVIL,ZESTRIL   40 mg, Oral, Daily      TRINTELLIX 10 MG tablet  Generic drug:  Vortioxetine HBr   1 tablet, Oral, Daily      VENTOLIN  (90 Base) MCG/ACT inhaler  Generic drug:  albuterol sulfate HFA   No dose, route, or frequency recorded.      albuterol (2.5 MG/3ML) 0.083% nebulizer solution  Commonly known as:  PROVENTIL   No dose, route, or frequency recorded.         Stop These Medications    diclofenac 75 MG EC tablet  Commonly known as:  VOLTAREN     methylPREDNISolone 4 MG tablet  Commonly known as:  MEDROL (SELINA)            Discharge Diet: Cardiac    Activity at Discharge: Ad judith.    Discharge Care Plan/Instructions: Complete the course of antibiotics.  Follow-up with GI in 2 weeks for biopsy results.    Follow-up Appointments:   Future Appointments   Date Time Provider Department Center   2/27/2019  3:15 PM Marilou Mario APRN [facility] None       Test Results Pending at Discharge:    Order Current Status    Tissue Pathology Exam In process    Blood Culture - Blood, Arm, Left Preliminary result    Blood Culture - Blood, Arm, Left Preliminary result          Signed     Dr Jonas Quezada DO   2/21/2019  12:26 PM        Time: More than 30 minutes spent on discharge

## 2019-02-21 NOTE — PROGRESS NOTES
SUBJECTIVE:   2/21/2019  Chief Complaint:     Subjective      Patient is 38 y.o. female who states marked improvement in bowel movements and abdominal pain.  Patient is being discharged today to follow-up in GI as an outpatient.  Patient underwent colonoscopy yesterday with little evidence of active colitis biopsies were taken of the colon.     CURRENT MEDICATIONS/OBJECTIVE/VS/PE:     Current Medications:     Current Facility-Administered Medications   Medication Dose Route Frequency Provider Last Rate Last Dose   • acetaminophen (TYLENOL) tablet 650 mg  650 mg Oral Q4H PRN Mario Lazaro MD   650 mg at 02/19/19 1958   • albuterol (PROVENTIL) nebulizer solution 0.083% 2.5 mg/3mL  2.5 mg Nebulization Q6H PRN Mario Lazaro MD       • buprenorphine-naloxone (SUBOXONE) 8-2 MG per SL tablet 1 tablet  1 tablet Sublingual BID Jonas Quezada DO   1 tablet at 02/21/19 0845   • calcium carbonate (TUMS) chewable tablet 500 mg (200 mg elemental)  2 tablet Oral BID PRN Mario Lazaro MD       • dextrose 5 % and sodium chloride 0.45 % infusion  30 mL/hr Intravenous Continuous PRN Virgil Aiken MD       • dicyclomine (BENTYL) capsule 10 mg  10 mg Oral TID PRN Mario Lazaro MD   10 mg at 02/19/19 2104   • famotidine (PEPCID) injection 20 mg  20 mg Intravenous Q12H Mario Lazaro MD   20 mg at 02/21/19 0846   • gabapentin (NEURONTIN) capsule 800 mg  800 mg Oral Q8H Mario Lazaro MD   800 mg at 02/21/19 0559   • heparin (porcine) 5000 UNIT/ML injection 5,000 Units  5,000 Units Subcutaneous Q8H Mario Lazaro MD   5,000 Units at 02/21/19 0558   • hydrochlorothiazide (HYDRODIURIL) tablet 25 mg  25 mg Oral Daily Mario Lazaro MD   25 mg at 02/19/19 0944   • ketorolac (TORADOL) injection 30 mg  30 mg Intravenous Q6H PRN Mario Lazaro H, MD   30 mg at 02/20/19 0633   • levETIRAcetam (KEPPRA) tablet 500 mg  500 mg Oral Q12H  Mario Lazaro MD   500 mg at 02/21/19 0845   • levoFLOXacin (LEVAQUIN) 500 mg/100 mL D5W (premix) (LEVAQUIN) 500 mg  500 mg Intravenous Q24H Mario Lazaro MD   Stopped at 02/20/19 2100   • lisinopril (PRINIVIL,ZESTRIL) tablet 40 mg  40 mg Oral Daily Mario Lazaro MD   40 mg at 02/19/19 0950   • metroNIDAZOLE (FLAGYL) IVPB 500 mg  500 mg Intravenous Q8H Mario Lazaro MD   Stopped at 02/21/19 0440   • morphine injection 2 mg  2 mg Intravenous Q3H PRN Mario Lazaro MD   2 mg at 02/18/19 1937   • ondansetron (ZOFRAN) tablet 4 mg  4 mg Oral Q6H PRN Mario Lazaro MD   4 mg at 02/21/19 0851    Or   • ondansetron ODT (ZOFRAN-ODT) disintegrating tablet 4 mg  4 mg Oral Q6H PRN Mario Lazaro MD        Or   • ondansetron (ZOFRAN) injection 4 mg  4 mg Intravenous Q6H PRN Mario Lazaro MD   4 mg at 02/19/19 1949   • sennosides-docusate sodium (SENOKOT-S) 8.6-50 MG tablet 2 tablet  2 tablet Oral BID PRN Mario Lazaro MD       • sodium chloride 0.9 % flush 3 mL  3 mL Intravenous Q12H Mario Lazaro MD   3 mL at 02/20/19 2000   • sodium chloride 0.9 % flush 3-10 mL  3-10 mL Intravenous PRN Mario Lazaro MD   10 mL at 02/19/19 0945   • sodium chloride 0.9 % infusion  125 mL/hr Intravenous Continuous Jonas Quezada DO   Stopped at 02/21/19 1102   • Vortioxetine HBr tablet 10 mg  10 mg Oral Daily Jonas Quezada DO   10 mg at 02/21/19 0846       Objective     Physical Exam:   Temp:  [97 °F (36.1 °C)-98.6 °F (37 °C)] 98.1 °F (36.7 °C)  Heart Rate:  [70-91] 74  Resp:  [16-18] 18  BP: (119-140)/(62-88) 119/88     Physical Exam:  General Appearance:    Alert, cooperative, in no acute distress   Head:    Normocephalic, without obvious abnormality, atraumatic   Eyes:            Lids and lashes normal, conjunctivae and sclerae normal, no   icterus, no pallor, corneas clear, PERRLA   Ears:    Ears appear intact with no  abnormalities noted   Throat:   No oral lesions, no thrush, oral mucosa moist   Neck:   No adenopathy, supple, trachea midline, no thyromegaly, no     carotid bruit, no JVD   Back:     No kyphosis present, no scoliosis present, no skin lesions,       erythema or scars, no tenderness to percussion or                   palpation,   range of motion normal   Lungs:     Clear to auscultation,respirations regular, even and                   unlabored    Heart:    Regular rhythm and normal rate, normal S1 and S2, no            murmur, no gallop, no rub, no click   Breast Exam:    Deferred   Abdomen:     Normal bowel sounds, no masses, no organomegaly, soft        non-tender, non-distended, no guarding, no rebound                 tenderness   Genitalia:    Deferred   Extremities:   Moves all extremities well, no edema, no cyanosis, no              redness   Pulses:   Pulses palpable and equal bilaterally   Skin:   No bleeding, bruising or rash   Lymph nodes:   No palpable adenopathy   Neurologic:   Cranial nerves 2 - 12 grossly intact, sensation intact, DTR        present and equal bilaterally      Results Review:     Lab Results (last 24 hours)     Procedure Component Value Units Date/Time    Tissue Pathology Exam [[id number]] Collected:  02/20/19 1655    Specimen:  Tissue from Large Intestine Updated:  02/21/19 0818    Comprehensive Metabolic Panel [[id number]]  (Abnormal) Collected:  02/21/19 0519    Specimen:  Blood Updated:  02/21/19 0636     Glucose 103 mg/dL      BUN 9 mg/dL      Creatinine 0.67 mg/dL      Sodium 135 mmol/L      Potassium 3.5 mmol/L      Chloride 108 mmol/L      CO2 24.0 mmol/L      Calcium 7.6 mg/dL      Total Protein 5.0 g/dL      Albumin 2.70 g/dL      ALT (SGPT) 25 U/L      AST (SGOT) 14 U/L      Alkaline Phosphatase 53 U/L      Total Bilirubin <0.1 mg/dL      eGFR Non African Amer 99 mL/min/1.73      Globulin 2.3 gm/dL      A/G Ratio 1.2 g/dL      BUN/Creatinine Ratio 13.4     Anion Gap 3.0 mmol/L      CBC & Differential [[id number]] Collected:  02/21/19 0519    Specimen:  Blood Updated:  02/21/19 0617    Narrative:       The following orders were created for panel order CBC & Differential.  Procedure                               Abnormality         Status                     ---------                               -----------         ------                     CBC Auto Differential[[id number]]        Abnormal            Final result                 Please view results for these tests on the individual orders.    CBC Auto Differential [[id number]]  (Abnormal) Collected:  02/21/19 0519    Specimen:  Blood Updated:  02/21/19 0617     WBC 4.40 10*3/mm3      RBC 3.72 10*6/mm3      Hemoglobin 11.2 g/dL      Hematocrit 34.1 %      MCV 91.7 fL      MCH 30.1 pg      MCHC 32.8 g/dL      RDW 13.2 %      RDW-SD 44.3 fl      MPV 9.1 fL      Platelets 224 10*3/mm3      Neutrophil % 41.6 %      Lymphocyte % 46.6 %      Monocyte % 7.0 %      Eosinophil % 3.0 %      Basophil % 0.7 %      Immature Grans % 1.1 %      Neutrophils, Absolute 1.83 10*3/mm3      Lymphocytes, Absolute 2.05 10*3/mm3      Monocytes, Absolute 0.31 10*3/mm3      Eosinophils, Absolute 0.13 10*3/mm3      Basophils, Absolute 0.03 10*3/mm3      Immature Grans, Absolute 0.05 10*3/mm3      nRBC 0.0 /100 WBC     Gastrointestinal Panel, PCR - Stool, Per Rectum [[id number]]  (Normal) Collected:  02/20/19 1655    Specimen:  Stool from Per Rectum Updated:  02/20/19 1947     Campylobacter Not Detected     Clostridium difficile (toxin A/B) Not Detected     Plesiomonas shigelloides Not Detected     Salmonella Not Detected     Vibrio Not Detected     Vibrio cholerae Not Detected     Yersinia enterocolitica Not Detected     Enteroaggregative E. coli (EAEC) Not Detected     Enteropathogenic E. coli (EPEC) Not Detected     Enterotoxigenic E. coli (ETEC) lt/st Not Detected     Shiga-like toxin-producing E. coli (STEC) stx1/stx2 Not Detected     E. coli O157 Not Detected      Shigella/Enteroinvasive E. coli (EIEC) Not Detected     Cryptosporidium Not Detected     Cyclospora cayetanensis Not Detected     Entamoeba histolytica Not Detected     Giardia lamblia Not Detected     Adenovirus F40/41 Not Detected     Astrovirus Not Detected     Norovirus GI/GII Not Detected     Rotavirus A Not Detected     Sapovirus (I, II, IV or V) Not Detected    Narrative:       Testing performed by multiplex PCR system.    Blood Culture - Blood, Arm, Left [[id number]] Collected:  02/18/19 1350    Specimen:  Blood from Arm, Left Updated:  02/20/19 1400     Blood Culture No growth at 2 days    Blood Culture - Blood, Arm, Left [[id number]] Collected:  02/18/19 1350    Specimen:  Blood from Arm, Left Updated:  02/20/19 1400     Blood Culture No growth at 2 days           I reviewed the patient's new clinical results.  I reviewed the patient's new imaging results and agree with the interpretation.     ASSESSMENT/PLAN:   ASSESSMENT: Patient with remarkable improvement in colitis.  Patient is being discharged home today.  Patient had colonoscopy with biopsies of the colon but no areas of erythema or otitis were seen on colonoscopy.    PLAN: #1 patient should be discharged home on antibiotics.  #2 patient should follow-up with GI within 2 weeks of discharge.  This will be to review patient's results from the colonoscopy and to evaluate patient's response to continue therapy.  The risks, benefits, and alternatives of this procedure have been discussed with the patient or the responsible party- the patient understands and agrees to proceed.         Virgil Aiken MD  02/21/19  12:06 PM           This document has been electronically signed by Virgil Aiken MD on February 21, 2019 12:06 PM

## 2019-02-21 NOTE — PLAN OF CARE
Problem: Bowel Disease, Inflammatory (Adult)  Goal: Signs and Symptoms of Listed Potential Problems Will be Absent, Minimized or Managed (Bowel Disease, Inflammatory)  Outcome: Ongoing (interventions implemented as appropriate)      Problem: Sepsis/Septic Shock (Adult)  Goal: Signs and Symptoms of Listed Potential Problems Will be Absent, Minimized or Managed (Sepsis/Septic Shock)  Outcome: Ongoing (interventions implemented as appropriate)      Problem: Fluid Volume Deficit (Adult)  Goal: Optimal Fluid Balance  Outcome: Ongoing (interventions implemented as appropriate)      Problem: Patient Care Overview  Goal: Plan of Care Review  Outcome: Ongoing (interventions implemented as appropriate)   02/20/19 5658   Coping/Psychosocial   Plan of Care Reviewed With patient   Plan of Care Review   Progress no change   OTHER   Outcome Summary No new complaints at this time; will continue to monitor.

## 2019-02-22 LAB
LAB AP CASE REPORT: NORMAL
PATH REPORT.FINAL DX SPEC: NORMAL
PATH REPORT.GROSS SPEC: NORMAL

## 2019-02-23 LAB
BACTERIA SPEC AEROBE CULT: NORMAL
BACTERIA SPEC AEROBE CULT: NORMAL

## 2019-02-27 ENCOUNTER — OFFICE VISIT (OUTPATIENT)
Dept: GASTROENTEROLOGY | Facility: CLINIC | Age: 39
End: 2019-02-27

## 2019-02-27 VITALS
HEIGHT: 60 IN | DIASTOLIC BLOOD PRESSURE: 80 MMHG | BODY MASS INDEX: 40.52 KG/M2 | SYSTOLIC BLOOD PRESSURE: 122 MMHG | HEART RATE: 74 BPM | WEIGHT: 206.4 LBS

## 2019-02-27 DIAGNOSIS — K58.8 OTHER IRRITABLE BOWEL SYNDROME: ICD-10-CM

## 2019-02-27 DIAGNOSIS — K52.9 COLITIS: Primary | ICD-10-CM

## 2019-02-27 DIAGNOSIS — R10.84 GENERALIZED ABDOMINAL PAIN: ICD-10-CM

## 2019-02-27 PROCEDURE — 99214 OFFICE O/P EST MOD 30 MIN: CPT | Performed by: NURSE PRACTITIONER

## 2019-02-27 RX ORDER — ONDANSETRON 4 MG/1
TABLET, ORALLY DISINTEGRATING ORAL
Refills: 0 | COMMUNITY
Start: 2019-02-11 | End: 2021-05-20 | Stop reason: SDUPTHER

## 2019-02-27 RX ORDER — DICYCLOMINE HYDROCHLORIDE 10 MG/1
10 CAPSULE ORAL 3 TIMES DAILY PRN
Qty: 60 CAPSULE | Refills: 3 | Status: SHIPPED | OUTPATIENT
Start: 2019-02-27 | End: 2019-08-01 | Stop reason: SDUPTHER

## 2019-02-27 NOTE — PROGRESS NOTES
Chief Complaint   Patient presents with   • Abdominal Pain   • Diarrhea       Subjective    Tete Chen is a 38 y.o. female. she is here today for follow-up.    38-year-old female presents for hospital follow-up.  She was hospitalized diagnosed with colitis but states symptoms have been ongoing and worsening in the past 6 months and she has had multiple emergency department or urgent care visits regarding this.  Also reports she recently found out she had 2 uncles with Crohn's disease and is very concerned about inflammatory bowel disease.  Reports intermittent episodes of severe nausea vomiting and loose liquidy stool about 3 times per day then several days between bowel movements denies any current melena reports intermittent hematochezia though has lessened.  Was hospitalized and treated with antibiotics states symptoms did improve.    Colonoscopy was completed 2/20/19 and noted adequate prep external and internal hemorrhoids otherwise normal.  Random colonic biopsy no no significant histologic abnormality.  Stool was obtained which was negative.  She reports Bentyl has helped abdominal cramping and would like refill of that medication.    Abdominal Pain   This is a chronic problem. The current episode started more than 1 month ago (6 months ago ). The problem occurs intermittently. The problem has been waxing and waning. The pain is located in the generalized abdominal region. The pain is moderate. The quality of the pain is cramping and colicky. Associated symptoms include constipation, diarrhea (3 per day ), flatus, hematochezia, nausea and vomiting. Pertinent negatives include no anorexia, arthralgias, belching, fever, frequency, headaches, hematuria, melena, myalgias or weight loss.     Plan; we will schedule patient for MR enterography to further evaluate small bowel and rule out inflammatory bowel disease will obtain IBD panel.  Recommend she continue bland diet follow-up after test return office  sooner if needed.  Continue Bentyl for irritable bowel syndrome.     The following portions of the patient's history were reviewed and updated as appropriate:   Past Medical History:   Diagnosis Date   • Acute maxillary sinusitis    • Adjustment disorder with anxiety    • Adjustment disorder with anxious mood    • Biliary calculus     post cholecystectomy      • Candidiasis    • Central abdominal pain    • Chronic pain    • Community acquired pneumonia    • Dental abscess    • Dental caries     o/e   • Depressive disorder      with some anxiety      • Depressive disorder     not elsewhere classified      • Essential hypertension    • Essential hypertension    • Frequent hospital admissions    • Gastroesophageal reflux disease    • Gastroesophageal reflux disease    • Generalized anxiety disorder    • Hyperlipidemia    • Hyperreflexia    • Influenza     needs immunization   • Irritable bowel syndrome    • Major depressive disorder    • Malaise and fatigue    • Multiple joint pain    • Nausea and vomiting    • Need for prophylactic vaccination against Streptococcus pneumoniae (pneumococcus)    • Obesity    • Rhonchi     low-pitched   • Seizures (CMS/HCC)    • Tobacco dependence syndrome    • Upper respiratory infection      Past Surgical History:   Procedure Laterality Date   • CHOLECYSTECTOMY      laparoscopic (With cholangiogram. Symptomatic gallstones.)   06/03/2015    • COLONOSCOPY N/A 2/20/2019    Procedure: COLONOSCOPY;  Surgeon: Virgil Aiken MD;  Location: [facility] ENDOSCOPY;  Service: Gastroenterology   • ENDOSCOPY      w/ biopsy [id number] (Gastritis found inthe body of the stomach. EGD with biopsy.)   07/21/2015    • ENDOSCOPY      w/ tube [id number] (Normal esophagus. Gastritis in stomach. Biopsy taken. Normal duodenum. Biopsy taken.)   10/19/2011    • HYSTEROSCOPY  07/09/2008   • TOTAL ABDOMINAL HYSTERECTOMY WITH SALPINGO OOPHORECTOMY  09/03/2008   • TUBAL ABDOMINAL LIGATION  08/21/2006   • VAGINAL DELIVERY      ,  P2, had pre-eclampsia with both     Family History   Problem Relation Age of Onset   • Other Other         depressive disorder   • Diabetes Other    • Hypertension Other    • Other Other         Mother is diabetic, hypertensive, anxiety, depression. Father is 61, diabetic, hypertensive, had a CABG, first MI was at 50. He has had CVA's, TIA's. 10-year-old sister who is Type I diabetic     OB History     No data available        Prior to Admission medications    Medication Sig Start Date End Date Taking? Authorizing Provider   acetaminophen (TYLENOL) 325 MG tablet Take 650 mg by mouth every 4 (four) hours as needed for mild pain (1-3).   Yes ProviderHeather MD   albuterol (PROVENTIL) (2.5 MG/3ML) 0.083% nebulizer solution  2/15/19  Yes Emergency, Nurse Bren, RN   ALPRAZolam (XANAX) 1 MG tablet Take 1 mg by mouth.   Yes ProviderHeather MD   buprenorphine-naloxone (SUBOXONE) 8-2 MG per SL tablet Place 1 tablet under the tongue Daily.   Yes ProviderHeather MD   dicyclomine (BENTYL) 10 MG capsule Take 1 capsule by mouth 3 (Three) Times a Day As Needed (abdominal cramping). 2/17/19  Yes Ovi Curran MD   gabapentin (NEURONTIN) 800 MG tablet Take 1 tablet by mouth 3 (Three) Times a Day. 10/14/16  Yes Erin Devine MD   hydrochlorothiazide (HYDRODIURIL) 25 MG tablet Take 25 mg by mouth Daily.   Yes ProviderHeather MD   levETIRAcetam (KEPPRA) 500 MG tablet Take 1 tablet by mouth 2 (Two) Times a Day. 10/14/16  Yes Erin Devine MD   levoFLOXacin (LEVAQUIN) 750 MG tablet Take 1 tablet by mouth Daily. 2/21/19  Yes Jonas Quezada DO   lisinopril (PRINIVIL,ZESTRIL) 40 MG tablet Take 40 mg by mouth Daily.   Yes ProviderHeather MD   metroNIDAZOLE (FLAGYL) 500 MG tablet Take 1 tablet by mouth 3 (Three) Times a Day for 7 days. 2/21/19 2/28/19 Yes Jonas Quezada DO   probiotic (CULTURELLE) capsule capsule Take 1 capsule by mouth 2 (Two) Times a Day. 2/21/19  Yes Jonas Quezada, DO  "  TRINTELLIX 10 MG tablet Take 1 tablet by mouth Daily. 1/18/19  Yes Emergency, Nurse Bren, RN   VENTOLIN  (90 Base) MCG/ACT inhaler  2/15/19  Yes Emergency, Nurse Bren RN   cefdinir (OMNICEF) 300 MG capsule TAKE ONE CAPSULE BY MOUTH TWICE DAILY FOR SEVEN DAYS 2/11/19 2/27/19 Yes Emergency, Nurse Bren RN   ondansetron ODT (ZOFRAN-ODT) 4 MG disintegrating tablet dissolve ONE tablet by MOUTH every 4-6 hours FOR THREE DAYS 2/11/19   Provider, MD Heather     Allergies   Allergen Reactions   • Vancomycin Angioedema   • Ceclor [Cefaclor]    • Reglan [Metoclopramide]    • Valtrex [Valacyclovir Hcl]    • Zithromax [Azithromycin]      z-iam     Social History     Socioeconomic History   • Marital status:      Spouse name: Not on file   • Number of children: Not on file   • Years of education: Not on file   • Highest education level: Not on file   Tobacco Use   • Smoking status: Current Every Day Smoker     Packs/day: 0.25     Types: Electronic Cigarette, Cigarettes   • Smokeless tobacco: Never Used   Substance and Sexual Activity   • Alcohol use: No   • Drug use: No       Review of Systems  Review of Systems   Constitutional: Positive for fatigue. Negative for activity change, appetite change, chills, diaphoresis, fever, unexpected weight change and weight loss.   HENT: Negative for sore throat and trouble swallowing.    Respiratory: Negative for shortness of breath.    Gastrointestinal: Positive for abdominal pain, constipation, diarrhea (3 per day ), flatus, hematochezia, nausea and vomiting. Negative for abdominal distention, anal bleeding, anorexia, blood in stool, melena and rectal pain.   Genitourinary: Negative for frequency and hematuria.   Musculoskeletal: Negative for arthralgias and myalgias.   Skin: Negative for pallor.   Neurological: Negative for light-headedness and headaches.        /80 (BP Location: Left arm)   Pulse 74   Ht 152.4 cm (60\")   Wt 93.6 kg (206 lb 6.4 oz)   BMI " 40.31 kg/m²     Objective    Physical Exam   Constitutional: She is oriented to person, place, and time. She appears well-developed and well-nourished. She is cooperative. No distress.   HENT:   Head: Normocephalic and atraumatic.   Neck: Normal range of motion. Neck supple. No thyromegaly present.   Cardiovascular: Normal rate, regular rhythm and normal heart sounds.   Pulmonary/Chest: Effort normal and breath sounds normal. She has no wheezes. She has no rhonchi. She has no rales.   Abdominal: Soft. Normal appearance and bowel sounds are normal. She exhibits no shifting dullness, no distension, no fluid wave and no ascites. There is no hepatosplenomegaly. There is no tenderness. There is no rigidity and no guarding. No hernia.   Lymphadenopathy:     She has no cervical adenopathy.   Neurological: She is alert and oriented to person, place, and time.   Skin: Skin is warm, dry and intact. No rash noted. No pallor.   Psychiatric: She has a normal mood and affect. Her speech is normal.     Admission on 02/18/2019, Discharged on 02/21/2019   No results displayed because visit has over 200 results.        Assessment/Plan      1. Colitis    2. Generalized abdominal pain    3. Other irritable bowel syndrome    .       Orders placed during this encounter include:  Orders Placed This Encounter   Procedures   • MRI Abdomen Pelvis Enterography     Standing Status:   Future     Standing Expiration Date:   2/27/2020     Order Specific Question:   Patient Pregnant     Answer:   No   • Inflammatory Bowel Disease Panel     Standing Status:   Future     Standing Expiration Date:   2/27/2020       * Surgery not found *    Review and/or summary of lab tests, radiology, procedures, medications. Review and summary of old records and obtaining of history. The risks and benefits of my recommendations, as well as other treatment options were discussed with the patient today. Questions were answered.    New Medications Ordered This Visit    Medications   • dicyclomine (BENTYL) 10 MG capsule     Sig: Take 1 capsule by mouth 3 (Three) Times a Day As Needed (abdominal cramping).     Dispense:  60 capsule     Refill:  3       Follow-up: Return in about 4 weeks (around 3/27/2019).          This document has been electronically signed by DOLORES Messina on February 28, 2019 7:35 AM             Results for orders placed or performed during the hospital encounter of 02/18/19   Gastrointestinal Panel, PCR - Stool, Per Rectum   Result Value Ref Range    Campylobacter Not Detected Not Detected    Clostridium difficile (toxin A/B) Not Detected Not Detected, N/A    Plesiomonas shigelloides Not Detected Not Detected    Salmonella Not Detected Not Detected    Vibrio Not Detected Not Detected    Vibrio cholerae Not Detected Not Detected    Yersinia enterocolitica Not Detected Not Detected    Enteroaggregative E. coli (EAEC) Not Detected Not Detected    Enteropathogenic E. coli (EPEC) Not Detected Not Detected    Enterotoxigenic E. coli (ETEC) lt/st Not Detected Not Detected    Shiga-like toxin-producing E. coli (STEC) stx1/stx2 Not Detected Not Detected    E. coli O157 Not Detected Not Detected    Shigella/Enteroinvasive E. coli (EIEC) Not Detected Not Detected    Cryptosporidium Not Detected Not Detected    Cyclospora cayetanensis Not Detected Not Detected    Entamoeba histolytica Not Detected Not Detected    Giardia lamblia Not Detected Not Detected    Adenovirus F40/41 Not Detected Not Detected    Astrovirus Not Detected Not Detected    Norovirus GI/GII Not Detected Not Detected    Rotavirus A Not Detected Not Detected    Sapovirus (I, II, IV or V) Not Detected Not Detected   Lactic Acid, Reflex Timer (This will reflex a repeat order 3-3:15 hours after ordered.)   Result Value Ref Range    Extra Tube Hold for add-ons.    Lactic Acid, Reflex   Result Value Ref Range    Lactate 3.5 (C) 0.5 - 2.0 mmol/L   Gold Top - SST   Result Value Ref Range    Extra Tube Hold  for add-ons.    Green Top (Gel)   Result Value Ref Range    Extra Tube Hold for add-ons.    Urinalysis With Culture If Indicated - Urine, Clean Catch   Result Value Ref Range    Color, UA Yellow Yellow, Straw, Dark Yellow, Tatyana    Appearance, UA Cloudy (A) Clear    pH, UA 5.5 5.0 - 9.0    Specific Gravity, UA 1.028 1.003 - 1.030    Glucose, UA Negative Negative    Ketones, UA Negative Negative    Bilirubin, UA Small (1+) (A) Negative    Blood, UA Negative Negative    Protein, UA Trace (A) Negative    Leuk Esterase, UA Negative Negative    Nitrite, UA Negative Negative    Urobilinogen, UA 0.2 E.U./dL 0.2 - 1.0 E.U./dL   CBC Auto Differential   Result Value Ref Range    WBC 4.40 3.40 - 10.80 10*3/mm3    RBC 3.72 (L) 3.77 - 5.28 10*6/mm3    Hemoglobin 11.2 (L) 12.0 - 15.9 g/dL    Hematocrit 34.1 34.0 - 46.6 %    MCV 91.7 79.0 - 97.0 fL    MCH 30.1 26.6 - 33.0 pg    MCHC 32.8 31.5 - 35.7 g/dL    RDW 13.2 12.3 - 15.4 %    RDW-SD 44.3 37.0 - 54.0 fl    MPV 9.1 6.0 - 12.0 fL    Platelets 224 140 - 450 10*3/mm3    Neutrophil % 41.6 (L) 42.7 - 76.0 %    Lymphocyte % 46.6 (H) 19.6 - 45.3 %    Monocyte % 7.0 5.0 - 12.0 %    Eosinophil % 3.0 0.3 - 6.2 %    Basophil % 0.7 0.0 - 1.5 %    Immature Grans % 1.1 (H) 0.0 - 0.5 %    Neutrophils, Absolute 1.83 1.40 - 7.00 10*3/mm3    Lymphocytes, Absolute 2.05 0.70 - 3.10 10*3/mm3    Monocytes, Absolute 0.31 0.10 - 0.90 10*3/mm3    Eosinophils, Absolute 0.13 0.00 - 0.40 10*3/mm3    Basophils, Absolute 0.03 0.00 - 0.20 10*3/mm3    Immature Grans, Absolute 0.05 0.00 - 0.05 10*3/mm3    nRBC 0.0 0.0 - 0.0 /100 WBC   CBC Auto Differential   Result Value Ref Range    WBC 5.09 3.40 - 10.80 10*3/mm3    RBC 4.05 3.77 - 5.28 10*6/mm3    Hemoglobin 11.9 (L) 12.0 - 15.9 g/dL    Hematocrit 36.5 34.0 - 46.6 %    MCV 90.1 79.0 - 97.0 fL    MCH 29.4 26.6 - 33.0 pg    MCHC 32.6 31.5 - 35.7 g/dL    RDW 13.1 12.3 - 15.4 %    RDW-SD 43.3 37.0 - 54.0 fl    MPV 9.3 6.0 - 12.0 fL    Platelets 204 140 - 450  10*3/mm3    Neutrophil % 58.7 42.7 - 76.0 %    Lymphocyte % 30.8 19.6 - 45.3 %    Monocyte % 6.1 5.0 - 12.0 %    Eosinophil % 2.6 0.3 - 6.2 %    Basophil % 0.4 0.0 - 1.5 %    Immature Grans % 1.4 (H) 0.0 - 0.5 %    Neutrophils, Absolute 2.99 1.40 - 7.00 10*3/mm3    Lymphocytes, Absolute 1.57 0.70 - 3.10 10*3/mm3    Monocytes, Absolute 0.31 0.10 - 0.90 10*3/mm3    Eosinophils, Absolute 0.13 0.00 - 0.40 10*3/mm3    Basophils, Absolute 0.02 0.00 - 0.20 10*3/mm3    Immature Grans, Absolute 0.07 (H) 0.00 - 0.05 10*3/mm3    nRBC 0.0 0.0 - 0.0 /100 WBC   CBC Auto Differential   Result Value Ref Range    WBC 12.16 (H) 3.40 - 10.80 10*3/mm3    RBC 4.93 3.77 - 5.28 10*6/mm3    Hemoglobin 14.8 12.0 - 15.9 g/dL    Hematocrit 45.8 34.0 - 46.6 %    MCV 92.9 79.0 - 97.0 fL    MCH 30.0 26.6 - 33.0 pg    MCHC 32.3 31.5 - 35.7 g/dL    RDW 13.5 12.3 - 15.4 %    RDW-SD 46.0 37.0 - 54.0 fl    MPV 8.7 6.0 - 12.0 fL    Platelets 300 140 - 450 10*3/mm3    Neutrophil % 87.8 (H) 42.7 - 76.0 %    Lymphocyte % 8.1 (L) 19.6 - 45.3 %    Monocyte % 2.0 (L) 5.0 - 12.0 %    Eosinophil % 0.2 (L) 0.3 - 6.2 %    Basophil % 0.4 0.0 - 1.5 %    Immature Grans % 1.5 (H) 0.0 - 0.5 %    Neutrophils, Absolute 10.68 (H) 1.40 - 7.00 10*3/mm3    Lymphocytes, Absolute 0.99 0.70 - 3.10 10*3/mm3    Monocytes, Absolute 0.24 0.10 - 0.90 10*3/mm3    Eosinophils, Absolute 0.02 0.00 - 0.40 10*3/mm3    Basophils, Absolute 0.05 0.00 - 0.20 10*3/mm3    Immature Grans, Absolute 0.18 (H) 0.00 - 0.05 10*3/mm3    nRBC 0.0 0.0 - 0.0 /100 WBC     *Note: Due to a large number of results and/or encounters for the requested time period, some results have not been displayed. A complete set of results can be found in Results Review.

## 2019-02-27 NOTE — PATIENT INSTRUCTIONS

## 2019-03-06 ENCOUNTER — APPOINTMENT (OUTPATIENT)
Dept: MRI IMAGING | Facility: HOSPITAL | Age: 39
End: 2019-03-06

## 2019-03-07 ENCOUNTER — APPOINTMENT (OUTPATIENT)
Dept: MRI IMAGING | Facility: HOSPITAL | Age: 39
End: 2019-03-07

## 2019-03-15 ENCOUNTER — OFFICE VISIT (OUTPATIENT)
Dept: ORTHOPEDIC SURGERY | Facility: CLINIC | Age: 39
End: 2019-03-15

## 2019-03-15 VITALS — WEIGHT: 198 LBS | BODY MASS INDEX: 38.87 KG/M2 | HEIGHT: 60 IN

## 2019-03-15 DIAGNOSIS — M25.562 CHRONIC PAIN OF LEFT KNEE: Primary | ICD-10-CM

## 2019-03-15 DIAGNOSIS — G89.29 CHRONIC PAIN OF LEFT KNEE: Primary | ICD-10-CM

## 2019-03-15 DIAGNOSIS — T14.8XXA CONTUSION OF BONE: ICD-10-CM

## 2019-03-15 DIAGNOSIS — M76.32 ILIOTIBIAL BAND SYNDROME OF LEFT SIDE: ICD-10-CM

## 2019-03-15 PROCEDURE — 99213 OFFICE O/P EST LOW 20 MIN: CPT | Performed by: NURSE PRACTITIONER
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
## 2019-03-19 DIAGNOSIS — M25.562 CHRONIC PAIN OF LEFT KNEE: Primary | ICD-10-CM

## 2019-03-19 DIAGNOSIS — G89.29 CHRONIC PAIN OF LEFT KNEE: Primary | ICD-10-CM

## 2019-03-21 ENCOUNTER — OFFICE VISIT (OUTPATIENT)
Dept: ORTHOPEDIC SURGERY | Facility: CLINIC | Age: 39
End: 2019-03-21

## 2019-03-21 VITALS — WEIGHT: 199.3 LBS | BODY MASS INDEX: 39.13 KG/M2 | HEIGHT: 60 IN

## 2019-03-21 DIAGNOSIS — M25.562 CHRONIC PAIN OF LEFT KNEE: Primary | ICD-10-CM

## 2019-03-21 DIAGNOSIS — M23.92 INTERNAL DERANGEMENT OF LEFT KNEE: ICD-10-CM

## 2019-03-21 DIAGNOSIS — G89.29 CHRONIC PAIN OF LEFT KNEE: Primary | ICD-10-CM

## 2019-03-21 DIAGNOSIS — M25.462 EFFUSION OF KNEE JOINT, LEFT: ICD-10-CM

## 2019-03-21 DIAGNOSIS — T14.8XXA CONTUSION OF BONE: ICD-10-CM

## 2019-03-21 DIAGNOSIS — M76.32 ILIOTIBIAL BAND SYNDROME OF LEFT SIDE: ICD-10-CM

## 2019-03-21 PROCEDURE — 99213 OFFICE O/P EST LOW 20 MIN: CPT | Performed by: ORTHOPAEDIC SURGERY
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
## 2019-03-27 ENCOUNTER — APPOINTMENT (OUTPATIENT)
Dept: PHYSICAL THERAPY | Facility: HOSPITAL | Age: 39
End: 2019-03-27

## 2019-04-02 ENCOUNTER — APPOINTMENT (OUTPATIENT)
Dept: PHYSICAL THERAPY | Facility: HOSPITAL | Age: 39
End: 2019-04-02

## 2019-04-08 ENCOUNTER — APPOINTMENT (OUTPATIENT)
Dept: PHYSICAL THERAPY | Facility: HOSPITAL | Age: 39
End: 2019-04-08

## 2019-04-12 ENCOUNTER — APPOINTMENT (OUTPATIENT)
Dept: PHYSICAL THERAPY | Facility: HOSPITAL | Age: 39
End: 2019-04-12

## 2019-04-15 ENCOUNTER — TELEPHONE (OUTPATIENT)
Dept: ORTHOPEDIC SURGERY | Facility: CLINIC | Age: 39
End: 2019-04-15

## 2019-04-15 ENCOUNTER — HOSPITAL ENCOUNTER (OUTPATIENT)
Dept: PHYSICAL THERAPY | Facility: HOSPITAL | Age: 39
Setting detail: THERAPIES SERIES
Discharge: HOME | End: 2019-04-15

## 2019-04-15 DIAGNOSIS — T14.8XXA CONTUSION OF BONE: ICD-10-CM

## 2019-04-15 DIAGNOSIS — G89.29 CHRONIC PAIN OF LEFT KNEE: ICD-10-CM

## 2019-04-15 DIAGNOSIS — M23.92 INTERNAL DERANGEMENT OF LEFT KNEE: Primary | ICD-10-CM

## 2019-04-15 DIAGNOSIS — M25.562 CHRONIC PAIN OF LEFT KNEE: ICD-10-CM

## 2019-04-15 DIAGNOSIS — M25.562 CHRONIC PAIN OF LEFT KNEE: Primary | ICD-10-CM

## 2019-04-15 DIAGNOSIS — M76.32 ILIOTIBIAL BAND SYNDROME OF LEFT SIDE: ICD-10-CM

## 2019-04-15 DIAGNOSIS — G89.29 CHRONIC PAIN OF LEFT KNEE: Primary | ICD-10-CM

## 2019-04-15 DIAGNOSIS — M25.462 EFFUSION OF KNEE JOINT, LEFT: ICD-10-CM

## 2019-04-15 PROCEDURE — 97162 PT EVAL MOD COMPLEX 30 MIN: CPT | Performed by: PHYSICAL THERAPIST
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
## 2019-04-15 NOTE — TELEPHONE ENCOUNTER
PATIENT IS REQUESTING AN ORDER FOR A CANE TO BE CALLED TO [facility].    PHONE # [phone]

## 2019-04-17 ENCOUNTER — APPOINTMENT (OUTPATIENT)
Dept: PHYSICAL THERAPY | Facility: HOSPITAL | Age: 39
End: 2019-04-17

## 2019-04-19 ENCOUNTER — HOSPITAL ENCOUNTER (OUTPATIENT)
Dept: PHYSICAL THERAPY | Facility: HOSPITAL | Age: 39
Setting detail: THERAPIES SERIES
Discharge: HOME | End: 2019-04-19

## 2019-04-19 DIAGNOSIS — M25.462 EFFUSION OF KNEE JOINT, LEFT: ICD-10-CM

## 2019-04-19 DIAGNOSIS — G89.29 CHRONIC PAIN OF LEFT KNEE: Primary | ICD-10-CM

## 2019-04-19 DIAGNOSIS — M23.92 INTERNAL DERANGEMENT OF LEFT KNEE: ICD-10-CM

## 2019-04-19 DIAGNOSIS — M25.562 CHRONIC PAIN OF LEFT KNEE: Primary | ICD-10-CM

## 2019-04-19 DIAGNOSIS — T14.8XXA CONTUSION OF BONE: ICD-10-CM

## 2019-04-19 DIAGNOSIS — M76.32 ILIOTIBIAL BAND SYNDROME OF LEFT SIDE: ICD-10-CM

## 2019-04-19 PROCEDURE — 97110 THERAPEUTIC EXERCISES: CPT

## 2019-04-19 PROCEDURE — 97035 APP MDLTY 1+ULTRASOUND EA 15: CPT

## 2019-04-19 NOTE — THERAPY TREATMENT NOTE
Outpatient Physical Therapy Ortho Treatment Note  [facility]     Patient Name: Tete Chen  : 1980  MRN: [id number]  Today's Date: 2019      Visit Date: 2019    Sub imp 0  Visit 2/2 Eval + 6  (19-19, 18 units/ 3 per)  MD none scheduled with ortho  Re 19    Visit Dx:    ICD-10-CM ICD-9-CM   1. Chronic pain of left knee M25.562 719.46    G89.29 338.29   2. Iliotibial band syndrome of left side M76.32 728.89   3. Contusion of bone T14.8XXA 924.9   4. Effusion of knee joint, left M25.462 719.06   5. Internal derangement of left knee M23.92 717.9       Patient Active Problem List   Diagnosis   • Tobacco dependence syndrome   • Obesity   • Depressive disorder   • Generalized anxiety disorder   • Essential hypertension   • Bilateral hand numbness   • Pain in both hands   • Numbness and tingling in both hands   • Acute pain of left knee   • Effusion of knee joint, left   • Chronic pain of left knee   • Iliotibial band syndrome of left side   • Contusion of bone   • Colitis   • Internal derangement of left knee        Past Medical History:   Diagnosis Date   • Acute maxillary sinusitis    • Adjustment disorder with anxiety    • Adjustment disorder with anxious mood    • Biliary calculus     post cholecystectomy      • Candidiasis    • Central abdominal pain    • Chronic pain    • Community acquired pneumonia    • Dental abscess    • Dental caries     o/e   • Depressive disorder      with some anxiety      • Depressive disorder     not elsewhere classified      • Essential hypertension    • Essential hypertension    • Frequent hospital admissions    • Gastroesophageal reflux disease    • Gastroesophageal reflux disease    • Generalized anxiety disorder    • Hyperlipidemia    • Hyperreflexia    • Influenza     needs immunization   • Irritable bowel syndrome    • Major depressive disorder    • Malaise and fatigue    • Multiple joint pain    • Nausea and vomiting    • Need for  prophylactic vaccination against Streptococcus pneumoniae (pneumococcus)    • Obesity    • Rhonchi     low-pitched   • Seizures (CMS/HCC)    • Tobacco dependence syndrome    • Upper respiratory infection         Past Surgical History:   Procedure Laterality Date   • CHOLECYSTECTOMY      laparoscopic (With cholangiogram. Symptomatic gallstones.)   06/03/2015    • COLONOSCOPY N/A 2/20/2019    Procedure: COLONOSCOPY;  Surgeon: Virgil Aiken MD;  Location: [facility] ENDOSCOPY;  Service: Gastroenterology   • ENDOSCOPY      w/ biopsy 22937 (Gastritis found inthe body of the stomach. EGD with biopsy.)   07/21/2015    • ENDOSCOPY      w/ tube 01181 (Normal esophagus. Gastritis in stomach. Biopsy taken. Normal duodenum. Biopsy taken.)   10/19/2011    • HYSTEROSCOPY  07/09/2008   • TOTAL ABDOMINAL HYSTERECTOMY WITH SALPINGO OOPHORECTOMY  09/03/2008   • TUBAL ABDOMINAL LIGATION  08/21/2006   • VAGINAL DELIVERY      , P2, had pre-eclampsia with both                       PT Assessment/Plan     Row Name 04/19/19 1146          PT Assessment    Assessment Comments  Ex sheets for new HEP, Demo'ed appropriately. Pt limited to 3 units per Rx per Insurance.   (Pended)   -        PT Plan    PT Frequency  2x/week  (Pended)   -     Predicted Duration of Therapy Intervention (Therapy Eval)  6 weeks  (Pended)   -     PT Plan Comments  Cont per POC. US, ROM, strength. Add clamshell/Hip Abd next.   (Pended)   -       User Key  (r) = Recorded By, (t) = Taken By, (c) = Cosigned By    Initials Name Provider Type    PERFECTO Ronaldo Luo, ATC           Modalities     Row Name 04/19/19 1100             Ultrasound 91585    Location  L Ant-medial/Ant-lateral knee  (Pended)   -      Duty Cycle  100  (Pended)   -PERFECTO      Frequency  3.0 MHz  (Pended)   -PERFECTO      Intensity - Wts/cm  1.5  (Pended)   -PERFECTO      27877 - PT Ultrasound Minutes  8  (Pended)   -        User Key  (r) = Recorded By, (t) = Taken By, (c) = Cosigned By     "Initials Name Provider Type    Ronaldo Russo, ATC         Exercises     Row Name 04/19/19 1100             Subjective Comments    Subjective Comments  Reports continued pain with L knee. Pain mostly on Front and inside.   (Pended)   -         Subjective Pain    Able to rate subjective pain?  yes  (Pended)   -PERFECTO      Pre-Treatment Pain Level  3  (Pended)   -PERFECTO      Post-Treatment Pain Level  3  (Pended)   -PERFECTO         Exercise 1    Exercise Name 1  US See modalities  (Pended)   -PERFECTO      Time 1  8'  (Pended)   -PERFECTO         Exercise 2    Exercise Name 2  QS  (Pended)   -PERFECTO      Reps 2  20  (Pended)   -PERFECTO         Exercise 3    Exercise Name 3  SLR  (Pended)   -PERFECTO      Sets 3  2  (Pended)   -PERFECTO      Reps 3  10  (Pended)   -PERFECTO         Exercise 4    Exercise Name 4  SAQ  (Pended)   -PERFECTO      Sets 4  2  (Pended)   -PERFECTO      Reps 4  10  (Pended)   -PERFECTO         Exercise 5    Exercise Name 5  Ham set  (Pended)   -PERFECTO      Sets 5  2  (Pended)   -PERFECTO      Reps 5  10  (Pended)   -PERFECTO         Exercise 6    Exercise Name 6  Pro II seat 9  (Pended)   -PERFECTO      Time 6  10'  (Pended)   -      Additional Comments  L1  (Pended)   -PERFECTO         Exercise 7    Exercise Name 7  Standing Ham stretch  (Pended)   -PERFECTO      Sets 7  3  (Pended)   -PERFECTO      Time 7  30\"  (Pended)   -PERFECTO         Exercise 8    Exercise Name 8  Incline stretch  (Pended)   -PERFECTO      Sets 8  3  (Pended)   -PERFECTO      Time 8  30\"  (Pended)   -PERFECTO         Exercise 9    Exercise Name 9  Ice  (Pended)   -PERFECTO      Time 9  15'  (Pended)   -        User Key  (r) = Recorded By, (t) = Taken By, (c) = Cosigned By    Initials Name Provider Type    Rnoaldo Russo, ATC                        PT OP Goals     Row Name 04/19/19 1100          PT Short Term Goals    STG Date to Achieve  05/06/19  (Pended)   -     STG 1  Note a >/= 25% subjective improvement.  (Pended)   -     STG 2  Active L knee extension to 0 degrees.  (Pended)   -     STG 3  Active L knee " flexion to be >/= 105 degrees.  (Pended)   -     STG 4  Increase L hip extension MMT to >/= 4/5.  (Pended)   -     STG 5  LEFS score to be >/= 35/80.  (Pended)   -        Long Term Goals    LTG Date to Achieve  05/27/19  (Pended)   -     LTG 1  Independent with HEP.  (Pended)   -     LTG 2  L knee active flexion to be >/= 120 degrees.  (Pended)   -     LTG 3  L hip and knee MMT 5/5 in all planes.  (Pended)   -     LTG 4  Minimally antalgic gait.  (Pended)   -       User Key  (r) = Recorded By, (t) = Taken By, (c) = Cosigned By    Initials Name Provider Type    Ronaldo Russo ATC           Therapy Education  Given: (P) HEP  Program: (P) Reinforced, New  How Provided: (P) Verbal, Written  Provided to: (P) Patient  Level of Understanding: (P) Verbalized, Demonstrated              Time Calculation:   Start Time: (P) 1110  Stop Time: (P) 1203  Time Calculation (min): (P) 53 min  Total Timed Code Minutes- PT: (P) 38 minute(s)  Therapy Charges for Today     Code Description Service Date Service Provider Modifiers Qty    45491055665 HC PT THER SUPP EA 15 MIN 4/19/2019 Ronaldo Luo, ATC  1    92593821967 HC PT THER PROC EA 15 MIN 4/19/2019 Ronaldo Luo, ATC  2    38334209597 HC PT ULTRASOUND EA 15 MIN 4/19/2019 Ronaldo Luo, ATC  1                    Ronaldo Luo ATC  4/19/2019

## 2019-04-23 ENCOUNTER — APPOINTMENT (OUTPATIENT)
Dept: PHYSICAL THERAPY | Facility: HOSPITAL | Age: 39
End: 2019-04-23

## 2019-04-24 ENCOUNTER — APPOINTMENT (OUTPATIENT)
Dept: PHYSICAL THERAPY | Facility: HOSPITAL | Age: 39
End: 2019-04-24

## 2019-04-25 ENCOUNTER — HOSPITAL ENCOUNTER (OUTPATIENT)
Dept: PHYSICAL THERAPY | Facility: HOSPITAL | Age: 39
Setting detail: THERAPIES SERIES
Discharge: HOME | End: 2019-04-25

## 2019-04-25 DIAGNOSIS — M76.32 ILIOTIBIAL BAND SYNDROME OF LEFT SIDE: ICD-10-CM

## 2019-04-25 DIAGNOSIS — M23.92 INTERNAL DERANGEMENT OF LEFT KNEE: ICD-10-CM

## 2019-04-25 DIAGNOSIS — M25.562 CHRONIC PAIN OF LEFT KNEE: Primary | ICD-10-CM

## 2019-04-25 DIAGNOSIS — M25.462 EFFUSION OF KNEE JOINT, LEFT: ICD-10-CM

## 2019-04-25 DIAGNOSIS — T14.8XXA CONTUSION OF BONE: ICD-10-CM

## 2019-04-25 DIAGNOSIS — G89.29 CHRONIC PAIN OF LEFT KNEE: Primary | ICD-10-CM

## 2019-04-25 PROCEDURE — 97035 APP MDLTY 1+ULTRASOUND EA 15: CPT

## 2019-04-25 PROCEDURE — G0283 ELEC STIM OTHER THAN WOUND: HCPCS

## 2019-04-25 PROCEDURE — 97110 THERAPEUTIC EXERCISES: CPT

## 2019-04-25 NOTE — THERAPY TREATMENT NOTE
Outpatient Physical Therapy Ortho Treatment Note  [facility]     Patient Name: Tete Chen  : 1980  MRN: [id number]  Today's Date: 2019      Visit Date: 2019    Subjective Improvement 0  Visits 2/2  Visits approved 6 from 2019 to   RTMD PRN  Recert Date 2019  Left and and ITB pain    Visit Dx:    ICD-10-CM ICD-9-CM   1. Chronic pain of left knee M25.562 719.46    G89.29 338.29   2. Iliotibial band syndrome of left side M76.32 728.89   3. Contusion of bone T14.8XXA 924.9   4. Effusion of knee joint, left M25.462 719.06   5. Internal derangement of left knee M23.92 717.9       Patient Active Problem List   Diagnosis   • Tobacco dependence syndrome   • Obesity   • Depressive disorder   • Generalized anxiety disorder   • Essential hypertension   • Bilateral hand numbness   • Pain in both hands   • Numbness and tingling in both hands   • Acute pain of left knee   • Effusion of knee joint, left   • Chronic pain of left knee   • Iliotibial band syndrome of left side   • Contusion of bone   • Colitis   • Internal derangement of left knee        Past Medical History:   Diagnosis Date   • Acute maxillary sinusitis    • Adjustment disorder with anxiety    • Adjustment disorder with anxious mood    • Biliary calculus     post cholecystectomy      • Candidiasis    • Central abdominal pain    • Chronic pain    • Community acquired pneumonia    • Dental abscess    • Dental caries     o/e   • Depressive disorder      with some anxiety      • Depressive disorder     not elsewhere classified      • Essential hypertension    • Essential hypertension    • Frequent hospital admissions    • Gastroesophageal reflux disease    • Gastroesophageal reflux disease    • Generalized anxiety disorder    • Hyperlipidemia    • Hyperreflexia    • Influenza     needs immunization   • Irritable bowel syndrome    • Major depressive disorder    • Malaise and fatigue    • Multiple joint pain    •  Nausea and vomiting    • Need for prophylactic vaccination against Streptococcus pneumoniae (pneumococcus)    • Obesity    • Rhonchi     low-pitched   • Seizures (CMS/HCC)    • Tobacco dependence syndrome    • Upper respiratory infection         Past Surgical History:   Procedure Laterality Date   • CHOLECYSTECTOMY      laparoscopic (With cholangiogram. Symptomatic gallstones.)   06/03/2015    • COLONOSCOPY N/A 2/20/2019    Procedure: COLONOSCOPY;  Surgeon: Virgil Aiken MD;  Location: [facility] ENDOSCOPY;  Service: Gastroenterology   • ENDOSCOPY      w/ biopsy 09895 (Gastritis found inthe body of the stomach. EGD with biopsy.)   07/21/2015    • ENDOSCOPY      w/ tube 34004 (Normal esophagus. Gastritis in stomach. Biopsy taken. Normal duodenum. Biopsy taken.)   10/19/2011    • HYSTEROSCOPY  07/09/2008   • TOTAL ABDOMINAL HYSTERECTOMY WITH SALPINGO OOPHORECTOMY  09/03/2008   • TUBAL ABDOMINAL LIGATION  08/21/2006   • VAGINAL DELIVERY      , P2, had pre-eclampsia with both       PT Ortho     Row Name 04/25/19 0800       Posture/Observations    Posture/Observations Comments  amb independent with left brace  -CP       Left Lower Ext    Lt Knee Extension/Flexion AROM  0-95  -CP      User Key  (r) = Recorded By, (t) = Taken By, (c) = Cosigned By    Initials Name Provider Type    Marilou Bergman PTA Physical Therapy Assistant                      PT Assessment/Plan     Row Name 04/25/19 0934          PT Assessment    Assessment Comments  Edema noted in medial left knee  -CP        PT Plan    PT Frequency  2x/week  -CP     Predicted Duration of Therapy Intervention (Therapy Eval)  4 weeks  -CP     PT Plan Comments  Cont with POC.  Review HEP  -CP       User Key  (r) = Recorded By, (t) = Taken By, (c) = Cosigned By    Initials Name Provider Type    Marilou Bergman, PTA Physical Therapy Assistant          Modalities     Row Name 04/25/19 0800             Ice    Ice Applied  Yes  -CP      Location  left knee   -CP       Rx Minutes  -- 20 minutes with ifc  -CP      Ice S/P Rx  Yes  -CP         Ultrasound 10833    Location  l ant medial knee  -CP      Duty Cycle  100  -CP      Frequency  3.0 MHz  -CP      Intensity - Wts/cm  1.5  -CP      19818 - PT Ultrasound Minutes  8  -CP         ELECTRICAL STIMULATION    Attended/Unattended  Unattended  -CP      Stimulation Type  IFC  -CP      Location/Electrode Placement/Other  left knee  -CP       PT E-Stim Unattended (Manual) Minutes  20  -CP        User Key  (r) = Recorded By, (t) = Taken By, (c) = Cosigned By    Initials Name Provider Type    Marilou Bergman, PTA Physical Therapy Assistant        Exercises     Row Name 04/25/19 0800             Subjective Comments    Subjective Comments  Reports more knee pain today.  States that she helped a friend move yesterday.   States that MD suggested that she walk with crutches or a cane.  She is going to get some today  -CP         Subjective Pain    Able to rate subjective pain?  yes  -CP      Pre-Treatment Pain Level  4  -CP      Post-Treatment Pain Level  4  -CP         Exercise 1    Exercise Name 1  Pro II level 2  -CP      Time 1  10  -CP      Additional Comments  UE/LE  -CP         Exercise 2    Exercise Name 2  incline stretch  -CP      Cueing 2  Verbal  -CP      Sets 2  3  -CP      Time 2  30  -CP         Exercise 3    Exercise Name 3  LAQ  -CP      Cueing 3  Verbal;Demo  -CP      Sets 3  2  -CP      Reps 3  10  -CP         Exercise 4    Exercise Name 4  heelsldes with strap  -CP      Cueing 4  Verbal  -CP      Reps 4  30  -CP         Exercise 5    Exercise Name 5  QS  -CP      Cueing 5  Verbal  -CP      Sets 5  2  -CP      Reps 5  10  -CP         Exercise 6    Exercise Name 6  SLR  -CP      Cueing 6  Verbal  -CP      Sets 6  2  -CP      Reps 6  10  -CP        User Key  (r) = Recorded By, (t) = Taken By, (c) = Cosigned By    Initials Name Provider Type    Marilou Bergman, PTA Physical Therapy Assistant                        PT OP Goals     Row Name 04/25/19 0900          PT Short Term Goals    STG Date to Achieve  05/06/19  -CP     STG 1  Note a >/= 25% subjective improvement.  -CP     STG 1 Progress  Not Met  -CP     STG 2  Active L knee extension to 0 degrees.  -CP     STG 2 Progress  Met  -CP     STG 3  Active L knee flexion to be >/= 105 degrees.  -CP     STG 3 Progress  Progressing  -CP     STG 4  Increase L hip extension MMT to >/= 4/5.  -CP     STG 4 Progress  Progressing  -CP     STG 5  LEFS score to be >/= 35/80.  -CP     STG 5 Progress  Not Met  -CP        Long Term Goals    LTG Date to Achieve  05/27/19  -CP     LTG 1  Independent with HEP.  -CP     LTG 1 Progress  Ongoing  -CP     LTG 2  L knee active flexion to be >/= 120 degrees.  -CP     LTG 2 Progress  Progressing  -CP     LTG 3  L hip and knee MMT 5/5 in all planes.  -CP     LTG 3 Progress  Progressing  -CP     LTG 4  Minimally antalgic gait.  -CP     LTG 4 Progress  Not Met  -CP        Time Calculation    PT Goal Re-Cert Due Date  05/06/19  -CP       User Key  (r) = Recorded By, (t) = Taken By, (c) = Cosigned By    Initials Name Provider Type    CP Marilou Esposito, FELECIA Physical Therapy Assistant                         Time Calculation:   Start Time: 0845  Stop Time: 0950  Time Calculation (min): 65 min  Total Timed Code Minutes- PT: 39 minute(s)  Therapy Charges for Today     Code Description Service Date Service Provider Modifiers Qty    08536281861 HC PT ELECTRICAL STIM UNATTENDED 4/25/2019 Marilou Esposito, PTA  1    34341454532 HC PT ULTRASOUND EA 15 MIN 4/25/2019 Marilou Esposito, PTA GP 1    46544959675 HC PT THER PROC EA 15 MIN 4/25/2019 Marilou Esposito, FELECIA GP 2                    Marilou Esposito PTA  4/25/2019

## 2019-04-26 ENCOUNTER — APPOINTMENT (OUTPATIENT)
Dept: PHYSICAL THERAPY | Facility: HOSPITAL | Age: 39
End: 2019-04-26

## 2019-05-02 ENCOUNTER — APPOINTMENT (OUTPATIENT)
Dept: PHYSICAL THERAPY | Facility: HOSPITAL | Age: 39
End: 2019-05-02

## 2019-08-01 DIAGNOSIS — R10.84 GENERALIZED ABDOMINAL PAIN: ICD-10-CM

## 2019-08-01 DIAGNOSIS — K58.8 OTHER IRRITABLE BOWEL SYNDROME: ICD-10-CM

## 2019-08-01 RX ORDER — DICYCLOMINE HYDROCHLORIDE 10 MG/1
CAPSULE ORAL
Qty: 60 CAPSULE | Refills: 3 | Status: SHIPPED | OUTPATIENT
Start: 2019-08-01 | End: 2019-09-10 | Stop reason: DRUGHIGH

## 2019-09-10 ENCOUNTER — OFFICE VISIT (OUTPATIENT)
Dept: GASTROENTEROLOGY | Facility: CLINIC | Age: 39
End: 2019-09-10

## 2019-09-10 VITALS
WEIGHT: 203 LBS | SYSTOLIC BLOOD PRESSURE: 132 MMHG | HEIGHT: 60 IN | BODY MASS INDEX: 39.85 KG/M2 | HEART RATE: 86 BPM | DIASTOLIC BLOOD PRESSURE: 84 MMHG

## 2019-09-10 DIAGNOSIS — R11.0 NAUSEA: ICD-10-CM

## 2019-09-10 DIAGNOSIS — R10.10 PAIN OF UPPER ABDOMEN: Primary | ICD-10-CM

## 2019-09-10 PROCEDURE — 99214 OFFICE O/P EST MOD 30 MIN: CPT | Performed by: NURSE PRACTITIONER

## 2019-09-10 RX ORDER — DEXTROSE AND SODIUM CHLORIDE 5; .45 G/100ML; G/100ML
30 INJECTION, SOLUTION INTRAVENOUS CONTINUOUS PRN
Status: CANCELLED | OUTPATIENT
Start: 2019-10-21

## 2019-09-10 RX ORDER — SODIUM CHLORIDE 0.9 % (FLUSH) 0.9 %
3 SYRINGE (ML) INJECTION EVERY 12 HOURS SCHEDULED
Status: CANCELLED | OUTPATIENT
Start: 2019-09-10

## 2019-09-10 RX ORDER — SODIUM CHLORIDE 0.9 % (FLUSH) 0.9 %
10 SYRINGE (ML) INJECTION AS NEEDED
Status: CANCELLED | OUTPATIENT
Start: 2019-09-10

## 2019-09-10 RX ORDER — DICYCLOMINE HCL 20 MG
20 TABLET ORAL
Qty: 90 TABLET | Refills: 5 | Status: SHIPPED | OUTPATIENT
Start: 2019-09-10 | End: 2019-10-10

## 2019-09-10 NOTE — PROGRESS NOTES
Chief Complaint   Patient presents with   • Abdominal Pain       Subjective    Tete Chen is a 39 y.o. female. she is here today for follow-up.    Abdominal Pain   This is a chronic problem. The onset quality is gradual. The problem occurs daily. The problem has been waxing and waning. The pain is located in the generalized abdominal region. The pain is mild. The quality of the pain is colicky and cramping. The abdominal pain does not radiate. Associated symptoms include diarrhea, hematochezia, nausea and vomiting. Pertinent negatives include no anorexia, arthralgias, belching, constipation, dysuria, fever, flatus, frequency, headaches, hematuria or weight loss. The pain is aggravated by eating. Prior diagnostic workup includes lower endoscopy. Her past medical history is significant for GERD.   Patient reports abdominal pain is been ongoing since last visit in February.  Her colonoscopy at that time noted adequate prep and otherwise normal stool was obtained which was negative.  Bentyl helps her symptoms she would like to continue this medication states about every 3 weeks she has episodes of severe cramping nausea vomiting with diarrhea.  Hospitalized for colitis earlier this year and symptoms improved with antibiotics.  Has family history of Crohn's and 2 uncles.  Has tried to modify her diet and states in between episodes of pain and cramping will have days of constipation.  States when constipated hemorrhoids seem to flareup she will have rectal pain and occasional blood within her stool.  Plan; schedule patient for EGD due to upper abdominal pain on exam episodes of nausea and vomiting.  Discussed scheduling MR enterography is nonconclusive but will follow up with results of the EGD first.  Will increase Bentyl to 20 mg but cautioned patient this may worsen constipation.  Anusol for hemorrhoidal pain when needed         The following portions of the patient's history were reviewed and updated as  appropriate:   Past Medical History:   Diagnosis Date   • Acute maxillary sinusitis    • Adjustment disorder with anxiety    • Adjustment disorder with anxious mood    • Biliary calculus     post cholecystectomy      • Candidiasis    • Central abdominal pain    • Chronic pain    • Community acquired pneumonia    • Dental abscess    • Dental caries     o/e   • Depressive disorder      with some anxiety      • Depressive disorder     not elsewhere classified      • Essential hypertension    • Essential hypertension    • Frequent hospital admissions    • Gastroesophageal reflux disease    • Gastroesophageal reflux disease    • Generalized anxiety disorder    • Hyperlipidemia    • Hyperreflexia    • Influenza     needs immunization   • Irritable bowel syndrome    • Major depressive disorder    • Malaise and fatigue    • Multiple joint pain    • Nausea and vomiting    • Need for prophylactic vaccination against Streptococcus pneumoniae (pneumococcus)    • Obesity    • Rhonchi     low-pitched   • Seizures (CMS/HCC)    • Tobacco dependence syndrome    • Upper respiratory infection      Past Surgical History:   Procedure Laterality Date   • CHOLECYSTECTOMY      laparoscopic (With cholangiogram. Symptomatic gallstones.)   06/03/2015    • COLONOSCOPY N/A 2/20/2019    Procedure: COLONOSCOPY;  Surgeon: Virgil Aiken MD;  Location: [facility] ENDOSCOPY;  Service: Gastroenterology   • ENDOSCOPY      w/ biopsy 93673 (Gastritis found inthe body of the stomach. EGD with biopsy.)   07/21/2015    • ENDOSCOPY      w/ tube 41028 (Normal esophagus. Gastritis in stomach. Biopsy taken. Normal duodenum. Biopsy taken.)   10/19/2011    • HYSTEROSCOPY  07/09/2008   • TOTAL ABDOMINAL HYSTERECTOMY WITH SALPINGO OOPHORECTOMY  09/03/2008   • TUBAL ABDOMINAL LIGATION  08/21/2006   • VAGINAL DELIVERY      , P2, had pre-eclampsia with both     Family History   Problem Relation Age of Onset   • Other Other         depressive disorder   • Diabetes Other     • Hypertension Other    • Other Other         Mother is diabetic, hypertensive, anxiety, depression. Father is 61, diabetic, hypertensive, had a CABG, first MI was at 50. He has had CVA's, TIA's. 10-year-old sister who is Type I diabetic     OB History     No data available        Prior to Admission medications    Medication Sig Start Date End Date Taking? Authorizing Provider   acetaminophen (TYLENOL) 325 MG tablet Take 650 mg by mouth every 4 (four) hours as needed for mild pain (1-3).   Yes Heather Queen MD   albuterol (PROVENTIL) (2.5 MG/3ML) 0.083% nebulizer solution  2/15/19  Yes Emergency, Nurse Bren RN   ALPRAZolam (XANAX) 1 MG tablet Take 1 mg by mouth.   Yes Heather Queen MD   buprenorphine-naloxone (SUBOXONE) 8-2 MG per SL tablet Place  under the tongue Daily. 1 3/4 tablets a day   Yes Heather Queen MD   citalopram (CeleXA) 20 MG tablet Take 20 mg by mouth Daily. 5/14/19  Yes Emergency, Nurse Bren RN   dicyclomine (BENTYL) 10 MG capsule take ONE capsule by MOUTH three times daily AS NEEDED FOR abdominal cramping 8/1/19  Yes Marilou Mario APRN   gabapentin (NEURONTIN) 800 MG tablet Take 1 tablet by mouth 3 (Three) Times a Day.  Patient taking differently: Take 600 mg by mouth 3 (Three) Times a Day. 10/14/16  Yes Erin Devine MD   hydrochlorothiazide (HYDRODIURIL) 25 MG tablet Take 25 mg by mouth Daily.   Yes ProviderHeather MD   levETIRAcetam (KEPPRA) 500 MG tablet Take 1 tablet by mouth 2 (Two) Times a Day. 10/14/16  Yes Erin Devine MD   lisinopril (PRINIVIL,ZESTRIL) 40 MG tablet Take 40 mg by mouth Daily.   Yes ProviderHeather MD   MOVANTIK 25 MG tablet Take 25 mg by mouth Every Morning. 4/29/19  Yes Emergency, Nurse Bren RN   ondansetron ODT (ZOFRAN-ODT) 4 MG disintegrating tablet dissolve ONE tablet by MOUTH every 4-6 hours FOR THREE DAYS 2/11/19  Yes Heather Queen MD   probiotic (CULTURELLE) capsule capsule Take 1 capsule by mouth 2 (Two) Times  "a Day. 2/21/19  Yes Jonas Quezada, DO   VENTOLIN  (90 Base) MCG/ACT inhaler  2/15/19  Yes Emergency, Nurse Bren, RN     Allergies   Allergen Reactions   • Ceclor [Cefaclor] Anaphylaxis   • Vancomycin Angioedema   • Reglan [Metoclopramide] Mental Status Change   • Zithromax [Azithromycin] Nausea And Vomiting     z-iam   • Valtrex [Valacyclovir Hcl] Itching     Social History     Socioeconomic History   • Marital status:      Spouse name: Not on file   • Number of children: Not on file   • Years of education: Not on file   • Highest education level: Not on file   Tobacco Use   • Smoking status: Current Every Day Smoker     Packs/day: 0.25     Types: Electronic Cigarette, Cigarettes   • Smokeless tobacco: Never Used   Substance and Sexual Activity   • Alcohol use: No   • Drug use: No       Review of Systems  Review of Systems   Constitutional: Positive for fatigue. Negative for activity change, appetite change, chills, diaphoresis, fever, unexpected weight change and weight loss.   HENT: Negative for sore throat and trouble swallowing.    Respiratory: Negative for shortness of breath.    Gastrointestinal: Positive for abdominal distention, abdominal pain, diarrhea, hematochezia, nausea and vomiting. Negative for anal bleeding, anorexia, blood in stool, constipation, flatus and rectal pain.   Genitourinary: Negative for dysuria, frequency and hematuria.   Musculoskeletal: Negative for arthralgias.   Skin: Negative for pallor.   Neurological: Negative for light-headedness and headaches.        /84 (BP Location: Left arm)   Pulse 86   Ht 152.4 cm (60\")   Wt 92.1 kg (203 lb)   BMI 39.65 kg/m²     Objective    Physical Exam   Constitutional: She is oriented to person, place, and time. She appears well-developed and well-nourished. She is cooperative. No distress.   HENT:   Head: Normocephalic and atraumatic.   Neck: Normal range of motion. Neck supple. No thyromegaly present.   Cardiovascular: " Normal rate, regular rhythm and normal heart sounds.   Pulmonary/Chest: Effort normal and breath sounds normal. She has no wheezes. She has no rhonchi. She has no rales.   Abdominal: Soft. Normal appearance and bowel sounds are normal. She exhibits no distension. There is no hepatosplenomegaly. There is tenderness in the epigastric area and periumbilical area. There is no rigidity and no guarding. No hernia.   Lymphadenopathy:     She has no cervical adenopathy.   Neurological: She is alert and oriented to person, place, and time.   Skin: Skin is warm, dry and intact. No rash noted. No pallor.   Psychiatric: She has a normal mood and affect. Her speech is normal.     Admission on 02/18/2019, Discharged on 02/21/2019   No results displayed because visit has over 200 results.        Assessment/Plan      1. Pain of upper abdomen    2. Nausea    .       Orders placed during this encounter include:  Orders Placed This Encounter   Procedures   • Follow Anesthesia Guidelines / Standing Orders     Standing Status:   Future   • Obtain Informed Consent     Standing Status:   Future     Order Specific Question:   Informed Consent Given For     Answer:   ESOPHAGOGASTRODUODENOSCOPY possible dilation       ESOPHAGOGASTRODUODENOSCOPY possible dilation (N/A)    Review and/or summary of lab tests, radiology, procedures, medications. Review and summary of old records and obtaining of history. The risks and benefits of my recommendations, as well as other treatment options were discussed with the patient today. Questions were answered.    New Medications Ordered This Visit   Medications   • dicyclomine (BENTYL) 20 MG tablet     Sig: Take 1 tablet by mouth 4 (Four) Times a Day Before Meals & at Bedtime As Needed (cramping) for up to 30 days.     Dispense:  90 tablet     Refill:  5   • hydrocortisone (ANUSOL-HC) 2.5 % rectal cream     Sig: Insert  into the rectum 2 (Two) Times a Day As Needed for Hemorrhoids (rectal discomfort).      Dispense:  30 g     Refill:  5       Follow-up: Return in about 4 weeks (around 10/8/2019) for After test.          This document has been electronically signed by DOLORES Messina on September 10, 2019 3:11 PM             Results for orders placed or performed during the hospital encounter of 02/18/19   Gastrointestinal Panel, PCR - Stool, Per Rectum   Result Value Ref Range    Campylobacter Not Detected Not Detected    Clostridium difficile (toxin A/B) Not Detected Not Detected, N/A    Plesiomonas shigelloides Not Detected Not Detected    Salmonella Not Detected Not Detected    Vibrio Not Detected Not Detected    Vibrio cholerae Not Detected Not Detected    Yersinia enterocolitica Not Detected Not Detected    Enteroaggregative E. coli (EAEC) Not Detected Not Detected    Enteropathogenic E. coli (EPEC) Not Detected Not Detected    Enterotoxigenic E. coli (ETEC) lt/st Not Detected Not Detected    Shiga-like toxin-producing E. coli (STEC) stx1/stx2 Not Detected Not Detected    E. coli O157 Not Detected Not Detected    Shigella/Enteroinvasive E. coli (EIEC) Not Detected Not Detected    Cryptosporidium Not Detected Not Detected    Cyclospora cayetanensis Not Detected Not Detected    Entamoeba histolytica Not Detected Not Detected    Giardia lamblia Not Detected Not Detected    Adenovirus F40/41 Not Detected Not Detected    Astrovirus Not Detected Not Detected    Norovirus GI/GII Not Detected Not Detected    Rotavirus A Not Detected Not Detected    Sapovirus (I, II, IV or V) Not Detected Not Detected   Lactic Acid, Reflex Timer (This will reflex a repeat order 3-3:15 hours after ordered.)   Result Value Ref Range    Extra Tube Hold for add-ons.    Lactic Acid, Reflex   Result Value Ref Range    Lactate 3.5 (C) 0.5 - 2.0 mmol/L   Gold Top - SST   Result Value Ref Range    Extra Tube Hold for add-ons.    Green Top (Gel)   Result Value Ref Range    Extra Tube Hold for add-ons.    Urinalysis With Culture If Indicated -  Urine, Clean Catch   Result Value Ref Range    Color, UA Yellow Yellow, Straw, Dark Yellow, Tatyana    Appearance, UA Cloudy (A) Clear    pH, UA 5.5 5.0 - 9.0    Specific Gravity, UA 1.028 1.003 - 1.030    Glucose, UA Negative Negative    Ketones, UA Negative Negative    Bilirubin, UA Small (1+) (A) Negative    Blood, UA Negative Negative    Protein, UA Trace (A) Negative    Leuk Esterase, UA Negative Negative    Nitrite, UA Negative Negative    Urobilinogen, UA 0.2 E.U./dL 0.2 - 1.0 E.U./dL   CBC Auto Differential   Result Value Ref Range    WBC 4.40 3.40 - 10.80 10*3/mm3    RBC 3.72 (L) 3.77 - 5.28 10*6/mm3    Hemoglobin 11.2 (L) 12.0 - 15.9 g/dL    Hematocrit 34.1 34.0 - 46.6 %    MCV 91.7 79.0 - 97.0 fL    MCH 30.1 26.6 - 33.0 pg    MCHC 32.8 31.5 - 35.7 g/dL    RDW 13.2 12.3 - 15.4 %    RDW-SD 44.3 37.0 - 54.0 fl    MPV 9.1 6.0 - 12.0 fL    Platelets 224 140 - 450 10*3/mm3    Neutrophil % 41.6 (L) 42.7 - 76.0 %    Lymphocyte % 46.6 (H) 19.6 - 45.3 %    Monocyte % 7.0 5.0 - 12.0 %    Eosinophil % 3.0 0.3 - 6.2 %    Basophil % 0.7 0.0 - 1.5 %    Immature Grans % 1.1 (H) 0.0 - 0.5 %    Neutrophils, Absolute 1.83 1.40 - 7.00 10*3/mm3    Lymphocytes, Absolute 2.05 0.70 - 3.10 10*3/mm3    Monocytes, Absolute 0.31 0.10 - 0.90 10*3/mm3    Eosinophils, Absolute 0.13 0.00 - 0.40 10*3/mm3    Basophils, Absolute 0.03 0.00 - 0.20 10*3/mm3    Immature Grans, Absolute 0.05 0.00 - 0.05 10*3/mm3    nRBC 0.0 0.0 - 0.0 /100 WBC   CBC Auto Differential   Result Value Ref Range    WBC 5.09 3.40 - 10.80 10*3/mm3    RBC 4.05 3.77 - 5.28 10*6/mm3    Hemoglobin 11.9 (L) 12.0 - 15.9 g/dL    Hematocrit 36.5 34.0 - 46.6 %    MCV 90.1 79.0 - 97.0 fL    MCH 29.4 26.6 - 33.0 pg    MCHC 32.6 31.5 - 35.7 g/dL    RDW 13.1 12.3 - 15.4 %    RDW-SD 43.3 37.0 - 54.0 fl    MPV 9.3 6.0 - 12.0 fL    Platelets 204 140 - 450 10*3/mm3    Neutrophil % 58.7 42.7 - 76.0 %    Lymphocyte % 30.8 19.6 - 45.3 %    Monocyte % 6.1 5.0 - 12.0 %    Eosinophil % 2.6  0.3 - 6.2 %    Basophil % 0.4 0.0 - 1.5 %    Immature Grans % 1.4 (H) 0.0 - 0.5 %    Neutrophils, Absolute 2.99 1.40 - 7.00 10*3/mm3    Lymphocytes, Absolute 1.57 0.70 - 3.10 10*3/mm3    Monocytes, Absolute 0.31 0.10 - 0.90 10*3/mm3    Eosinophils, Absolute 0.13 0.00 - 0.40 10*3/mm3    Basophils, Absolute 0.02 0.00 - 0.20 10*3/mm3    Immature Grans, Absolute 0.07 (H) 0.00 - 0.05 10*3/mm3    nRBC 0.0 0.0 - 0.0 /100 WBC   CBC Auto Differential   Result Value Ref Range    WBC 12.16 (H) 3.40 - 10.80 10*3/mm3    RBC 4.93 3.77 - 5.28 10*6/mm3    Hemoglobin 14.8 12.0 - 15.9 g/dL    Hematocrit 45.8 34.0 - 46.6 %    MCV 92.9 79.0 - 97.0 fL    MCH 30.0 26.6 - 33.0 pg    MCHC 32.3 31.5 - 35.7 g/dL    RDW 13.5 12.3 - 15.4 %    RDW-SD 46.0 37.0 - 54.0 fl    MPV 8.7 6.0 - 12.0 fL    Platelets 300 140 - 450 10*3/mm3    Neutrophil % 87.8 (H) 42.7 - 76.0 %    Lymphocyte % 8.1 (L) 19.6 - 45.3 %    Monocyte % 2.0 (L) 5.0 - 12.0 %    Eosinophil % 0.2 (L) 0.3 - 6.2 %    Basophil % 0.4 0.0 - 1.5 %    Immature Grans % 1.5 (H) 0.0 - 0.5 %    Neutrophils, Absolute 10.68 (H) 1.40 - 7.00 10*3/mm3    Lymphocytes, Absolute 0.99 0.70 - 3.10 10*3/mm3    Monocytes, Absolute 0.24 0.10 - 0.90 10*3/mm3    Eosinophils, Absolute 0.02 0.00 - 0.40 10*3/mm3    Basophils, Absolute 0.05 0.00 - 0.20 10*3/mm3    Immature Grans, Absolute 0.18 (H) 0.00 - 0.05 10*3/mm3    nRBC 0.0 0.0 - 0.0 /100 WBC     *Note: Due to a large number of results and/or encounters for the requested time period, some results have not been displayed. A complete set of results can be found in Results Review.

## 2019-09-10 NOTE — PATIENT INSTRUCTIONS

## 2019-10-17 RX ORDER — DICYCLOMINE HCL 20 MG
20 TABLET ORAL 4 TIMES DAILY
COMMUNITY
End: 2020-09-30 | Stop reason: SDUPTHER

## 2019-11-01 ENCOUNTER — HOSPITAL ENCOUNTER (OUTPATIENT)
Facility: HOSPITAL | Age: 39
Setting detail: HOSPITAL OUTPATIENT SURGERY
Discharge: HOME | End: 2019-11-01
Attending: INTERNAL MEDICINE | Admitting: INTERNAL MEDICINE

## 2019-11-01 ENCOUNTER — ANESTHESIA EVENT (OUTPATIENT)
Dept: GASTROENTEROLOGY | Facility: HOSPITAL | Age: 39
End: 2019-11-01

## 2019-11-01 ENCOUNTER — ANESTHESIA (OUTPATIENT)
Dept: GASTROENTEROLOGY | Facility: HOSPITAL | Age: 39
End: 2019-11-01

## 2019-11-01 VITALS
OXYGEN SATURATION: 97 % | BODY MASS INDEX: 40.25 KG/M2 | RESPIRATION RATE: 17 BRPM | SYSTOLIC BLOOD PRESSURE: 102 MMHG | TEMPERATURE: 97.7 F | HEART RATE: 77 BPM | HEIGHT: 60 IN | DIASTOLIC BLOOD PRESSURE: 68 MMHG | WEIGHT: 205.03 LBS

## 2019-11-01 DIAGNOSIS — R11.0 NAUSEA: ICD-10-CM

## 2019-11-01 DIAGNOSIS — R10.10 PAIN OF UPPER ABDOMEN: ICD-10-CM

## 2019-11-01 PROCEDURE — 88305 TISSUE EXAM BY PATHOLOGIST: CPT | Performed by: INTERNAL MEDICINE

## 2019-11-01 PROCEDURE — 25010000002 PROPOFOL 10 MG/ML EMULSION: Performed by: NURSE ANESTHETIST, CERTIFIED REGISTERED

## 2019-11-01 PROCEDURE — 43239 EGD BIOPSY SINGLE/MULTIPLE: CPT | Performed by: INTERNAL MEDICINE

## 2019-11-01 PROCEDURE — 88305 TISSUE EXAM BY PATHOLOGIST: CPT | Performed by: PATHOLOGY

## 2019-11-01 RX ORDER — PROPOFOL 10 MG/ML
VIAL (ML) INTRAVENOUS AS NEEDED
Status: DISCONTINUED | OUTPATIENT
Start: 2019-11-01 | End: 2019-11-01 | Stop reason: SURG

## 2019-11-01 RX ORDER — DEXTROSE AND SODIUM CHLORIDE 5; .45 G/100ML; G/100ML
30 INJECTION, SOLUTION INTRAVENOUS CONTINUOUS PRN
Status: DISCONTINUED | OUTPATIENT
Start: 2019-11-01 | End: 2019-11-01 | Stop reason: HOSPADM

## 2019-11-01 RX ORDER — LIDOCAINE HYDROCHLORIDE 20 MG/ML
INJECTION, SOLUTION INTRAVENOUS AS NEEDED
Status: DISCONTINUED | OUTPATIENT
Start: 2019-11-01 | End: 2019-11-01 | Stop reason: SURG

## 2019-11-01 RX ADMIN — PROPOFOL 40 MG: 10 INJECTION, EMULSION INTRAVENOUS at 10:33

## 2019-11-01 RX ADMIN — PROPOFOL 80 MG: 10 INJECTION, EMULSION INTRAVENOUS at 10:28

## 2019-11-01 RX ADMIN — LIDOCAINE HYDROCHLORIDE 60 MG: 20 INJECTION, SOLUTION INTRAVENOUS at 10:28

## 2019-11-01 RX ADMIN — DEXTROSE AND SODIUM CHLORIDE 30 ML/HR: 5; 450 INJECTION, SOLUTION INTRAVENOUS at 09:37

## 2019-11-01 RX ADMIN — PROPOFOL 20 MG: 10 INJECTION, EMULSION INTRAVENOUS at 10:30

## 2019-11-01 RX ADMIN — PROPOFOL 50 MG: 10 INJECTION, EMULSION INTRAVENOUS at 10:31

## 2019-11-01 NOTE — ANESTHESIA POSTPROCEDURE EVALUATION
Patient: Tete Chen    Procedure Summary     Date:  11/01/19 Room / Location:  [facility] ENDOSCOPY 3 / [facility] ENDOSCOPY    Anesthesia Start:  1008 Anesthesia Stop:  1036    Procedure:  ESOPHAGOGASTRODUODENOSCOPY possible dilation (N/A ) Diagnosis:       Pain of upper abdomen      Nausea      (Pain of upper abdomen [R10.10])      (Nausea [R11.0])    Surgeon:  Virgil Aiken MD Provider:  Zina Kumar CRNA    Anesthesia Type:  MAC ASA Status:  3          Anesthesia Type: MAC  Last vitals  BP   112/83 (11/01/19 0925)   Temp   97.3 °F (36.3 °C) (11/01/19 0925)   Pulse   76 (11/01/19 0925)   Resp   18 (11/01/19 0925)     SpO2   94 % (11/01/19 0925)     Post Anesthesia Care and Evaluation    Patient location during evaluation: bedside  Patient participation: complete - patient participated  Level of consciousness: awake  Pain score: 0  Pain management: adequate  Airway patency: patent  Anesthetic complications: No anesthetic complications  PONV Status: none  Cardiovascular status: acceptable and hemodynamically stable  Respiratory status: acceptable  Hydration status: acceptable  Post Neuraxial Block status: Motor and sensory function returned to baseline

## 2019-11-01 NOTE — H&P
No chief complaint on file.      Subjective    Tete Chen is a 39 y.o. female. she is here today for follow-up.    Abdominal Pain   This is a chronic problem. The onset quality is gradual. The problem occurs daily. The problem has been waxing and waning. The pain is located in the generalized abdominal region. The pain is mild. The quality of the pain is colicky and cramping. The abdominal pain does not radiate. Associated symptoms include diarrhea, hematochezia, nausea and vomiting. Pertinent negatives include no anorexia, arthralgias, belching, constipation, dysuria, fever, flatus, frequency, headaches, hematuria or weight loss. The pain is aggravated by eating. Prior diagnostic workup includes lower endoscopy. Her past medical history is significant for GERD.   Patient reports abdominal pain is been ongoing since last visit in February.  Her colonoscopy at that time noted adequate prep and otherwise normal stool was obtained which was negative.  Bentyl helps her symptoms she would like to continue this medication states about every 3 weeks she has episodes of severe cramping nausea vomiting with diarrhea.  Hospitalized for colitis earlier this year and symptoms improved with antibiotics.  Has family history of Crohn's and 2 uncles.  Has tried to modify her diet and states in between episodes of pain and cramping will have days of constipation.  States when constipated hemorrhoids seem to flareup she will have rectal pain and occasional blood within her stool.  Plan; schedule patient for EGD due to upper abdominal pain on exam episodes of nausea and vomiting.  Discussed scheduling MR enterography is nonconclusive but will follow up with results of the EGD first.  Will increase Bentyl to 20 mg but cautioned patient this may worsen constipation.  Anusol for hemorrhoidal pain when needed         The following portions of the patient's history were reviewed and updated as appropriate:   Past Medical History:    Diagnosis Date   • Acute maxillary sinusitis    • Adjustment disorder with anxiety    • Adjustment disorder with anxious mood    • Biliary calculus     post cholecystectomy      • Candidiasis    • Central abdominal pain    • Chronic pain    • Community acquired pneumonia    • Dental abscess    • Dental caries     o/e   • Depressive disorder      with some anxiety      • Depressive disorder     not elsewhere classified      • Essential hypertension    • Essential hypertension    • Frequent hospital admissions    • Gastroesophageal reflux disease    • Gastroesophageal reflux disease    • Generalized anxiety disorder    • Hyperlipidemia    • Hyperreflexia    • Influenza     needs immunization   • Irritable bowel syndrome    • Major depressive disorder    • Malaise and fatigue    • Multiple joint pain    • Nausea and vomiting    • Need for prophylactic vaccination against Streptococcus pneumoniae (pneumococcus)    • Obesity    • Rhonchi     low-pitched   • Seizures (CMS/HCC)    • Tobacco dependence syndrome    • Upper respiratory infection      Past Surgical History:   Procedure Laterality Date   • CHOLECYSTECTOMY      laparoscopic (With cholangiogram. Symptomatic gallstones.)   06/03/2015    • COLONOSCOPY N/A 2/20/2019    Procedure: COLONOSCOPY;  Surgeon: Virgil Aiken MD;  Location: [facility] ENDOSCOPY;  Service: Gastroenterology   • ENDOSCOPY      w/ biopsy 11058 (Gastritis found inthe body of the stomach. EGD with biopsy.)   07/21/2015    • ENDOSCOPY      w/ tube 04348 (Normal esophagus. Gastritis in stomach. Biopsy taken. Normal duodenum. Biopsy taken.)   10/19/2011    • HYSTEROSCOPY  07/09/2008   • TOTAL ABDOMINAL HYSTERECTOMY WITH SALPINGO OOPHORECTOMY  09/03/2008   • TUBAL ABDOMINAL LIGATION  08/21/2006   • VAGINAL DELIVERY      , P2, had pre-eclampsia with both     Family History   Problem Relation Age of Onset   • Other Other         depressive disorder   • Diabetes Other    • Hypertension Other    • Other  Other         Mother is diabetic, hypertensive, anxiety, depression. Father is 61, diabetic, hypertensive, had a CABG, first MI was at 50. He has had CVA's, TIA's. 10-year-old sister who is Type I diabetic     OB History     No data available        Prior to Admission medications    Medication Sig Start Date End Date Taking? Authorizing Provider   acetaminophen (TYLENOL) 325 MG tablet Take 650 mg by mouth every 4 (four) hours as needed for mild pain (1-3).   Yes Heather Queen MD   albuterol (PROVENTIL) (2.5 MG/3ML) 0.083% nebulizer solution  2/15/19  Yes EmergencyNurse Bren RN   ALPRAZolam (XANAX) 1 MG tablet Take 1 mg by mouth.   Yes Heather Queen MD   buprenorphine-naloxone (SUBOXONE) 8-2 MG per SL tablet Place  under the tongue Daily. 1 3/4 tablets a day   Yes Heather Queen MD   citalopram (CeleXA) 20 MG tablet Take 20 mg by mouth Daily. 5/14/19  Yes Nurse Bren Bonilla RN   dicyclomine (BENTYL) 10 MG capsule take ONE capsule by MOUTH three times daily AS NEEDED FOR abdominal cramping 8/1/19  Yes Marilou Mario APRN   gabapentin (NEURONTIN) 800 MG tablet Take 1 tablet by mouth 3 (Three) Times a Day.  Patient taking differently: Take 600 mg by mouth 3 (Three) Times a Day. 10/14/16  Yes Erin Devine MD   hydrochlorothiazide (HYDRODIURIL) 25 MG tablet Take 25 mg by mouth Daily.   Yes Heather Queen MD   levETIRAcetam (KEPPRA) 500 MG tablet Take 1 tablet by mouth 2 (Two) Times a Day. 10/14/16  Yes Erin Devine MD   lisinopril (PRINIVIL,ZESTRIL) 40 MG tablet Take 40 mg by mouth Daily.   Yes Heather Queen MD   MOVANTIK 25 MG tablet Take 25 mg by mouth Every Morning. 4/29/19  Yes Nurse Bren Bonilla RN   ondansetron ODT (ZOFRAN-ODT) 4 MG disintegrating tablet dissolve ONE tablet by MOUTH every 4-6 hours FOR THREE DAYS 2/11/19  Yes Heather Queen MD   probiotic (CULTURELLE) capsule capsule Take 1 capsule by mouth 2 (Two) Times a Day. 2/21/19  Yes Jonas Quezada  "T, DO   VENTOLIN  (90 Base) MCG/ACT inhaler  2/15/19  Yes Emergency, Nurse Bren, RN     Allergies   Allergen Reactions   • Ceclor [Cefaclor] Anaphylaxis   • Vancomycin Angioedema   • Reglan [Metoclopramide] Mental Status Change   • Zithromax [Azithromycin] Nausea And Vomiting     z-iam   • Valtrex [Valacyclovir Hcl] Itching     Social History     Socioeconomic History   • Marital status:      Spouse name: Not on file   • Number of children: Not on file   • Years of education: Not on file   • Highest education level: Not on file   Tobacco Use   • Smoking status: Current Every Day Smoker     Packs/day: 0.25     Types: Electronic Cigarette, Cigarettes   • Smokeless tobacco: Never Used   Substance and Sexual Activity   • Alcohol use: No   • Drug use: No       Review of Systems  Review of Systems   Constitutional: Positive for fatigue. Negative for activity change, appetite change, chills, diaphoresis, fever, unexpected weight change and weight loss.   HENT: Negative for sore throat and trouble swallowing.    Respiratory: Negative for shortness of breath.    Gastrointestinal: Positive for abdominal distention, abdominal pain, diarrhea, hematochezia, nausea and vomiting. Negative for anal bleeding, anorexia, blood in stool, constipation, flatus and rectal pain.   Genitourinary: Negative for dysuria, frequency and hematuria.   Musculoskeletal: Negative for arthralgias.   Skin: Negative for pallor.   Neurological: Negative for light-headedness and headaches.        /83   Pulse 76   Temp 97.3 °F (36.3 °C)   Resp 18   Ht 152.4 cm (60\")   Wt 93 kg (205 lb 0.4 oz)   SpO2 94%   BMI 40.04 kg/m²     Objective    Physical Exam   Constitutional: She is oriented to person, place, and time. She appears well-developed and well-nourished. She is cooperative. No distress.   HENT:   Head: Normocephalic and atraumatic.   Neck: Normal range of motion. Neck supple. No thyromegaly present.   Cardiovascular: Normal " rate, regular rhythm and normal heart sounds.   Pulmonary/Chest: Effort normal and breath sounds normal. She has no wheezes. She has no rhonchi. She has no rales.   Abdominal: Soft. Normal appearance and bowel sounds are normal. She exhibits no distension. There is no hepatosplenomegaly. There is tenderness in the epigastric area and periumbilical area. There is no rigidity and no guarding. No hernia.   Lymphadenopathy:     She has no cervical adenopathy.   Neurological: She is alert and oriented to person, place, and time.   Skin: Skin is warm, dry and intact. No rash noted. No pallor.   Psychiatric: She has a normal mood and affect. Her speech is normal.     Admission on 02/18/2019, Discharged on 02/21/2019   No results displayed because visit has over 200 results.        Assessment/Plan      1. Pain of upper abdomen    2. Nausea    .       Orders placed during this encounter include:  Orders Placed This Encounter   Procedures   • Pregnancy, Urine -     If child bearing age and not had hysterectomy or tubal ligation, may obtain serum if unable to void     Standing Status:   Standing     Number of Occurrences:   1   • Implement Anesthesia Orders Day of Procedure     Standing Status:   Standing     Number of Occurrences:   1   • Obtain Informed Consent     Standing Status:   Standing     Number of Occurrences:   1     Order Specific Question:   Informed Consent Given For     Answer:   ESOPHAGOGASTRODUODENOSCOPY   • POC Glucose Once     Prior to Procedure on ALL Diabetic Patients     Standing Status:   Standing     Number of Occurrences:   1   • Insert Peripheral IV     Standing Status:   Standing     Number of Occurrences:   1       ESOPHAGOGASTRODUODENOSCOPY possible dilation (N/A)    Review and/or summary of lab tests, radiology, procedures, medications. Review and summary of old records and obtaining of history. The risks and benefits of my recommendations, as well as other treatment options were discussed with  the patient today. Questions were answered.    New Medications Ordered This Visit   Medications   • dextrose 5 % and sodium chloride 0.45 % infusion       Follow-up: No Follow-up on file.          This document has been electronically signed by Virgil Aiken MD on November 1, 2019 10:00 AM             Results for orders placed or performed during the hospital encounter of 02/18/19   Gastrointestinal Panel, PCR - Stool, Per Rectum   Result Value Ref Range    Campylobacter Not Detected Not Detected    Clostridium difficile (toxin A/B) Not Detected Not Detected, N/A    Plesiomonas shigelloides Not Detected Not Detected    Salmonella Not Detected Not Detected    Vibrio Not Detected Not Detected    Vibrio cholerae Not Detected Not Detected    Yersinia enterocolitica Not Detected Not Detected    Enteroaggregative E. coli (EAEC) Not Detected Not Detected    Enteropathogenic E. coli (EPEC) Not Detected Not Detected    Enterotoxigenic E. coli (ETEC) lt/st Not Detected Not Detected    Shiga-like toxin-producing E. coli (STEC) stx1/stx2 Not Detected Not Detected    E. coli O157 Not Detected Not Detected    Shigella/Enteroinvasive E. coli (EIEC) Not Detected Not Detected    Cryptosporidium Not Detected Not Detected    Cyclospora cayetanensis Not Detected Not Detected    Entamoeba histolytica Not Detected Not Detected    Giardia lamblia Not Detected Not Detected    Adenovirus F40/41 Not Detected Not Detected    Astrovirus Not Detected Not Detected    Norovirus GI/GII Not Detected Not Detected    Rotavirus A Not Detected Not Detected    Sapovirus (I, II, IV or V) Not Detected Not Detected   Lactic Acid, Reflex Timer (This will reflex a repeat order 3-3:15 hours after ordered.)   Result Value Ref Range    Extra Tube Hold for add-ons.    Lactic Acid, Reflex   Result Value Ref Range    Lactate 3.5 (C) 0.5 - 2.0 mmol/L   Gold Top - SST   Result Value Ref Range    Extra Tube Hold for add-ons.    Green Top (Gel)   Result Value Ref  Range    Extra Tube Hold for add-ons.    Urinalysis With Culture If Indicated - Urine, Clean Catch   Result Value Ref Range    Color, UA Yellow Yellow, Straw, Dark Yellow, Tatyana    Appearance, UA Cloudy (A) Clear    pH, UA 5.5 5.0 - 9.0    Specific Gravity, UA 1.028 1.003 - 1.030    Glucose, UA Negative Negative    Ketones, UA Negative Negative    Bilirubin, UA Small (1+) (A) Negative    Blood, UA Negative Negative    Protein, UA Trace (A) Negative    Leuk Esterase, UA Negative Negative    Nitrite, UA Negative Negative    Urobilinogen, UA 0.2 E.U./dL 0.2 - 1.0 E.U./dL   CBC Auto Differential   Result Value Ref Range    WBC 4.40 3.40 - 10.80 10*3/mm3    RBC 3.72 (L) 3.77 - 5.28 10*6/mm3    Hemoglobin 11.2 (L) 12.0 - 15.9 g/dL    Hematocrit 34.1 34.0 - 46.6 %    MCV 91.7 79.0 - 97.0 fL    MCH 30.1 26.6 - 33.0 pg    MCHC 32.8 31.5 - 35.7 g/dL    RDW 13.2 12.3 - 15.4 %    RDW-SD 44.3 37.0 - 54.0 fl    MPV 9.1 6.0 - 12.0 fL    Platelets 224 140 - 450 10*3/mm3    Neutrophil % 41.6 (L) 42.7 - 76.0 %    Lymphocyte % 46.6 (H) 19.6 - 45.3 %    Monocyte % 7.0 5.0 - 12.0 %    Eosinophil % 3.0 0.3 - 6.2 %    Basophil % 0.7 0.0 - 1.5 %    Immature Grans % 1.1 (H) 0.0 - 0.5 %    Neutrophils, Absolute 1.83 1.40 - 7.00 10*3/mm3    Lymphocytes, Absolute 2.05 0.70 - 3.10 10*3/mm3    Monocytes, Absolute 0.31 0.10 - 0.90 10*3/mm3    Eosinophils, Absolute 0.13 0.00 - 0.40 10*3/mm3    Basophils, Absolute 0.03 0.00 - 0.20 10*3/mm3    Immature Grans, Absolute 0.05 0.00 - 0.05 10*3/mm3    nRBC 0.0 0.0 - 0.0 /100 WBC   CBC Auto Differential   Result Value Ref Range    WBC 5.09 3.40 - 10.80 10*3/mm3    RBC 4.05 3.77 - 5.28 10*6/mm3    Hemoglobin 11.9 (L) 12.0 - 15.9 g/dL    Hematocrit 36.5 34.0 - 46.6 %    MCV 90.1 79.0 - 97.0 fL    MCH 29.4 26.6 - 33.0 pg    MCHC 32.6 31.5 - 35.7 g/dL    RDW 13.1 12.3 - 15.4 %    RDW-SD 43.3 37.0 - 54.0 fl    MPV 9.3 6.0 - 12.0 fL    Platelets 204 140 - 450 10*3/mm3    Neutrophil % 58.7 42.7 - 76.0 %     Lymphocyte % 30.8 19.6 - 45.3 %    Monocyte % 6.1 5.0 - 12.0 %    Eosinophil % 2.6 0.3 - 6.2 %    Basophil % 0.4 0.0 - 1.5 %    Immature Grans % 1.4 (H) 0.0 - 0.5 %    Neutrophils, Absolute 2.99 1.40 - 7.00 10*3/mm3    Lymphocytes, Absolute 1.57 0.70 - 3.10 10*3/mm3    Monocytes, Absolute 0.31 0.10 - 0.90 10*3/mm3    Eosinophils, Absolute 0.13 0.00 - 0.40 10*3/mm3    Basophils, Absolute 0.02 0.00 - 0.20 10*3/mm3    Immature Grans, Absolute 0.07 (H) 0.00 - 0.05 10*3/mm3    nRBC 0.0 0.0 - 0.0 /100 WBC   CBC Auto Differential   Result Value Ref Range    WBC 12.16 (H) 3.40 - 10.80 10*3/mm3    RBC 4.93 3.77 - 5.28 10*6/mm3    Hemoglobin 14.8 12.0 - 15.9 g/dL    Hematocrit 45.8 34.0 - 46.6 %    MCV 92.9 79.0 - 97.0 fL    MCH 30.0 26.6 - 33.0 pg    MCHC 32.3 31.5 - 35.7 g/dL    RDW 13.5 12.3 - 15.4 %    RDW-SD 46.0 37.0 - 54.0 fl    MPV 8.7 6.0 - 12.0 fL    Platelets 300 140 - 450 10*3/mm3    Neutrophil % 87.8 (H) 42.7 - 76.0 %    Lymphocyte % 8.1 (L) 19.6 - 45.3 %    Monocyte % 2.0 (L) 5.0 - 12.0 %    Eosinophil % 0.2 (L) 0.3 - 6.2 %    Basophil % 0.4 0.0 - 1.5 %    Immature Grans % 1.5 (H) 0.0 - 0.5 %    Neutrophils, Absolute 10.68 (H) 1.40 - 7.00 10*3/mm3    Lymphocytes, Absolute 0.99 0.70 - 3.10 10*3/mm3    Monocytes, Absolute 0.24 0.10 - 0.90 10*3/mm3    Eosinophils, Absolute 0.02 0.00 - 0.40 10*3/mm3    Basophils, Absolute 0.05 0.00 - 0.20 10*3/mm3    Immature Grans, Absolute 0.18 (H) 0.00 - 0.05 10*3/mm3    nRBC 0.0 0.0 - 0.0 /100 WBC     *Note: Due to a large number of results and/or encounters for the requested time period, some results have not been displayed. A complete set of results can be found in Results Review.

## 2019-11-04 LAB
LAB AP CASE REPORT: NORMAL
PATH REPORT.FINAL DX SPEC: NORMAL
PATH REPORT.GROSS SPEC: NORMAL

## 2020-03-31 ENCOUNTER — TELEMEDICINE (OUTPATIENT)
Dept: GASTROENTEROLOGY | Facility: CLINIC | Age: 40
End: 2020-03-31

## 2020-03-31 DIAGNOSIS — R19.7 DIARRHEA, UNSPECIFIED TYPE: ICD-10-CM

## 2020-03-31 DIAGNOSIS — R19.4 CHANGE IN BOWEL HABIT: ICD-10-CM

## 2020-03-31 DIAGNOSIS — R10.84 GENERALIZED ABDOMINAL PAIN: Primary | ICD-10-CM

## 2020-03-31 PROCEDURE — 99213 OFFICE O/P EST LOW 20 MIN: CPT | Performed by: NURSE PRACTITIONER

## 2020-03-31 NOTE — PROGRESS NOTES
No chief complaint on file.      Subjective    Tete Chen is a 39 y.o. female. she is   via telehealth follow-up.    39-year-old female presents for follow-up regarding abdominal pain change in bowel habits and diarrhea that is been alternating with constipation.  States symptoms were severe in February she was evaluated first at primary care office and given Levaquin and seen in urgent care and Flagyl was added to that.  She also had taken amoxicillin prior to that.  She had similar episode of symptoms last year February 2019 underwent CT which noted colitis and follow-up colonoscopy noted normal colon biopsies were negative.  States since she finished antibiotics 4 days ago symptoms have improved though still reports cramping and bowel movements every 2 to 3 days though stool is more formed.  She reports decreased appetite but has been trying to lose weight following a Slim fast diet.  She reports nausea but denies any vomiting.  States Bentyl helps cramping when she is able to take it.    Abdominal Pain   This is a new problem. The current episode started in the past 7 days. The problem occurs daily. The pain is located in the generalized abdominal region. The quality of the pain is cramping and sharp. Associated symptoms include constipation (alternating, takes movantik when constipated on suboxone ), diarrhea (more formed recently still has mucus ), nausea and vomiting. Pertinent negatives include no anorexia, arthralgias, belching or fever. She has tried antibiotics for the symptoms. The treatment provided mild relief.          The following portions of the patient's history were reviewed and updated as appropriate:   Past Medical History:   Diagnosis Date   • Acute maxillary sinusitis    • Adjustment disorder with anxiety    • Adjustment disorder with anxious mood    • Biliary calculus     post cholecystectomy      • Candidiasis    • Central abdominal pain    • Chronic pain    • Community acquired  pneumonia    • Dental abscess    • Dental caries     o/e   • Depressive disorder      with some anxiety      • Depressive disorder     not elsewhere classified      • Essential hypertension    • Essential hypertension    • Frequent hospital admissions    • Gastroesophageal reflux disease    • Gastroesophageal reflux disease    • Generalized anxiety disorder    • Hyperlipidemia    • Hyperreflexia    • Influenza     needs immunization   • Irritable bowel syndrome    • Major depressive disorder    • Malaise and fatigue    • Multiple joint pain    • Nausea and vomiting    • Need for prophylactic vaccination against Streptococcus pneumoniae (pneumococcus)    • Obesity    • Rhonchi     low-pitched   • Seizures (CMS/HCC)    • Tobacco dependence syndrome    • Upper respiratory infection      Past Surgical History:   Procedure Laterality Date   • CHOLECYSTECTOMY      laparoscopic (With cholangiogram. Symptomatic gallstones.)   06/03/2015    • COLONOSCOPY N/A 2/20/2019    Procedure: COLONOSCOPY;  Surgeon: Virgil Aiken MD;  Location: [facility] ENDOSCOPY;  Service: Gastroenterology   • ENDOSCOPY      w/ biopsy 89147 (Gastritis found inthe body of the stomach. EGD with biopsy.)   07/21/2015    • ENDOSCOPY      w/ tube [id number] (Normal esophagus. Gastritis in stomach. Biopsy taken. Normal duodenum. Biopsy taken.)   10/19/2011    • ENDOSCOPY N/A 11/1/2019    Procedure: ESOPHAGOGASTRODUODENOSCOPY possible dilation;  Surgeon: Virgil Aiken MD;  Location: [facility] ENDOSCOPY;  Service: Gastroenterology   • HYSTEROSCOPY  07/09/2008   • TOTAL ABDOMINAL HYSTERECTOMY WITH SALPINGO OOPHORECTOMY  09/03/2008   • TUBAL ABDOMINAL LIGATION  08/21/2006   • VAGINAL DELIVERY      , P2, had pre-eclampsia with both     Family History   Problem Relation Age of Onset   • Other Other         depressive disorder   • Diabetes Other    • Hypertension Other    • Other Other         Mother is diabetic, hypertensive, anxiety, depression. Father is 61,  diabetic, hypertensive, had a CABG, first MI was at 50. He has had CVA's, TIA's. 10-year-old sister who is Type I diabetic     OB History    None       Prior to Admission medications    Medication Sig Start Date End Date Taking? Authorizing Provider   albuterol (PROVENTIL) (2.5 MG/3ML) 0.083% nebulizer solution  2/15/19   Emergency, Nurse Epic, RN   ALPRAZolam (XANAX) 1 MG tablet Take 1 mg by mouth Every 6 (Six) Hours As Needed.    ProviderHeather MD   amoxicillin (AMOXIL) 500 MG capsule Take 500 mg by mouth 2 (Two) Times a Day. 1/29/20   Emergency, Nurse Epic, RN   buprenorphine-naloxone (SUBOXONE) 8-2 MG per SL tablet Place  under the tongue Daily. 1 3/4 tablets a day    ProviderHeather MD   citalopram (CeleXA) 20 MG tablet Take 40 mg by mouth Daily. 5/14/19   Emergency, Nurse Epic, RN   dicyclomine (BENTYL) 20 MG tablet Take 20 mg by mouth 4 (Four) Times a Day.    ProviderHeather MD   fluconazole (DIFLUCAN) 150 MG tablet Take 150 mg by mouth. 2/7/20   Emergency, Nurse Bren RN   gabapentin (NEURONTIN) 800 MG tablet Take 1 tablet by mouth 3 (Three) Times a Day.  Patient taking differently: Take 600 mg by mouth 3 (Three) Times a Day. 10/14/16   Erin Devine MD   hydrochlorothiazide (HYDRODIURIL) 25 MG tablet Take 12.5 mg by mouth Daily.    ProviderHeather MD   hydrocortisone (ANUSOL-HC) 2.5 % rectal cream Insert  into the rectum 2 (Two) Times a Day As Needed for Hemorrhoids (rectal discomfort). 9/10/19   Marilou Mario APRN   levETIRAcetam (KEPPRA) 500 MG tablet Take 1 tablet by mouth 2 (Two) Times a Day. 10/14/16   Erin Devine MD   levoFLOXacin (LEVAQUIN) 500 MG tablet Take 500 mg by mouth Daily. 2/7/20   Emergency, Nurse Correia RN   lisinopril (PRINIVIL,ZESTRIL) 40 MG tablet Take 20 mg by mouth Daily.    ProviderHeather MD   MOVANTIK 25 MG tablet Take 25 mg by mouth Every Morning. 4/29/19   Emergency, Nurse Epic, RN   omeprazole (priLOSEC) 40 MG capsule Take 40 mg by mouth  Daily. 1/21/20   Emergency, Nurse Bren, RN   ondansetron ODT (ZOFRAN-ODT) 4 MG disintegrating tablet dissolve ONE tablet by MOUTH every 4-6 hours FOR THREE DAYS 2/11/19   Provider, MD Heather   probiotic (CULTURELLE) capsule capsule Take 1 capsule by mouth 2 (Two) Times a Day. 2/21/19   Jonas Quezada,    VENTOLIN  (90 Base) MCG/ACT inhaler  2/15/19   Emergency, Nurse Bren, RN     Allergies   Allergen Reactions   • Ceclor [Cefaclor] Anaphylaxis   • Vancomycin Angioedema   • Reglan [Metoclopramide] Mental Status Change   • Zithromax [Azithromycin] Nausea And Vomiting     z-iam   • Valtrex [Valacyclovir Hcl] Itching     Social History     Socioeconomic History   • Marital status:      Spouse name: Not on file   • Number of children: Not on file   • Years of education: Not on file   • Highest education level: Not on file   Tobacco Use   • Smoking status: Current Every Day Smoker     Packs/day: 0.25     Types: Electronic Cigarette, Cigarettes   • Smokeless tobacco: Never Used   Substance and Sexual Activity   • Alcohol use: No   • Drug use: No       Review of Systems  Review of Systems   Constitutional: Positive for fatigue. Negative for activity change, appetite change, chills, diaphoresis, fever and unexpected weight change.   HENT: Negative for sore throat and trouble swallowing.    Respiratory: Negative for shortness of breath.    Gastrointestinal: Positive for abdominal pain, blood in stool (hemorrhoidal with straining ), constipation (alternating, takes movantik when constipated on suboxone ), diarrhea (more formed recently still has mucus ), nausea and vomiting. Negative for abdominal distention, anal bleeding, anorexia and rectal pain.   Musculoskeletal: Negative for arthralgias.   Skin: Negative for pallor.   Neurological: Negative for light-headedness.        There were no vitals taken for this visit.    Objective    Physical Exam  Admission on 11/01/2019, Discharged on 11/01/2019    Component Date Value Ref Range Status   • Case Report 11/01/2019    Final                    Value:Surgical Pathology Report                         Case: [id number]                                  Authorizing Provider:  Virgil Aiken MD        Collected:           11/01/2019 10:34 AM          Ordering Location:     [facility]             Received:            11/01/2019 11:23 AM                                 [facility]                                                     Pathologist:           Eddie Montero MD                                                        Specimens:   1) - Gastric, Antrum                                                                                2) - Small Intestine, Duodenum                                                                      3) - Esophagus, Distal                                                                    • Final Diagnosis 11/01/2019    Final                    Value:This result contains rich text formatting which cannot be displayed here.   • Gross Description 11/01/2019    Final                    Value:This result contains rich text formatting which cannot be displayed here.     Assessment/Plan      1. Generalized abdominal pain    2. Change in bowel habit    3. Diarrhea, unspecified type    .   We will schedule patient for CT abdomen pelvis to further evaluate discussed with patient that she likely needs a repeat colonoscopy however unable to schedule due to pandemic at this time.  Also obtain lab work and plan colonoscopy when able to schedule.  Would also consider MR enterography to further evaluate small bowel she has family history of Crohn disease.    Orders placed during this encounter include:  Orders Placed This Encounter   Procedures   • CT Abdomen Pelvis With Contrast     Standing Status:   Future     Standing Expiration Date:   3/31/2021     Order Specific Question:   Will Oral Contrast be needed for this procedure?      Answer:   Yes     Order Specific Question:   Patient Pregnant     Answer:   No   • CBC (No Diff)   • Comprehensive Metabolic Panel   • Sedimentation Rate     Standing Status:   Future     Standing Expiration Date:   3/31/2021       * Surgery not found *    Review and/or summary of lab tests, radiology, procedures, medications. Review and summary of old records and obtaining of history. The risks and benefits of my recommendations, as well as other treatment options were discussed with the patient today. Questions were answered.    No orders of the defined types were placed in this encounter.      Follow-up: Return in about 4 weeks (around 4/28/2020) for After test.          This document has been electronically signed by DOLORES Messina on March 31, 2020 12:58             Results for orders placed or performed during the hospital encounter of 11/01/19   Tissue Pathology Exam   Result Value Ref Range    Case Report       Surgical Pathology Report                         Case: [id number]                                  Authorizing Provider:  Virgil Aiken MD        Collected:           11/01/2019 10:34 AM          Ordering Location:     [facility]             Received:            11/01/2019 11:23 AM                                 [facility]                                                     Pathologist:           Eddie Montero MD                                                        Specimens:   1) - Gastric, Antrum                                                                                2) - Small Intestine, Duodenum                                                                      3) - Esophagus, Distal                                                                     Final Diagnosis       1.  GASTRIC ANTRUM, MUCOSAL BIOPSY:  MILD CHRONIC GASTRITIS.  NO EVIDENCE OF HELICOBACTER PYLORI.    2.  DUODENUM, MUCOSAL BIOPSY:  MILD CHRONIC INFLAMMATION.    3.  DISTAL  ESOPHAGUS, MUCOSAL BIOPSY:  SEGMENT OF MILDLY INFLAMED STRATIFIED SQUAMOUS EPITHELIUM.      Gross Description       In 3 containers, each of these show mucosal biopsies measuring up to 0.3 cm in greatest dimension.  Embedded accordingly.  1A antrum; 2A duodenum; 3A distal esophagus.     Results for orders placed or performed during the hospital encounter of 02/18/19   Gastrointestinal Panel, PCR - Stool, Per Rectum   Result Value Ref Range    Campylobacter Not Detected Not Detected    Clostridium difficile (toxin A/B) Not Detected Not Detected, N/A    Plesiomonas shigelloides Not Detected Not Detected    Salmonella Not Detected Not Detected    Vibrio Not Detected Not Detected    Vibrio cholerae Not Detected Not Detected    Yersinia enterocolitica Not Detected Not Detected    Enteroaggregative E. coli (EAEC) Not Detected Not Detected    Enteropathogenic E. coli (EPEC) Not Detected Not Detected    Enterotoxigenic E. coli (ETEC) lt/st Not Detected Not Detected    Shiga-like toxin-producing E. coli (STEC) stx1/stx2 Not Detected Not Detected    E. coli O157 Not Detected Not Detected    Shigella/Enteroinvasive E. coli (EIEC) Not Detected Not Detected    Cryptosporidium Not Detected Not Detected    Cyclospora cayetanensis Not Detected Not Detected    Entamoeba histolytica Not Detected Not Detected    Giardia lamblia Not Detected Not Detected    Adenovirus F40/41 Not Detected Not Detected    Astrovirus Not Detected Not Detected    Norovirus GI/GII Not Detected Not Detected    Rotavirus A Not Detected Not Detected    Sapovirus (I, II, IV or V) Not Detected Not Detected   Lactic Acid, Reflex Timer (This will reflex a repeat order 3-3:15 hours after ordered.)   Result Value Ref Range    Extra Tube Hold for add-ons.    Lactic Acid, Reflex   Result Value Ref Range    Lactate 3.5 (C) 0.5 - 2.0 mmol/L   Gold Top - SST   Result Value Ref Range    Extra Tube Hold for add-ons.    Green Top (Gel)   Result Value Ref Range    Extra Tube  Hold for add-ons.    Urinalysis With Culture If Indicated - Urine, Clean Catch   Result Value Ref Range    Color, UA Yellow Yellow, Straw, Dark Yellow, Tatyana    Appearance, UA Cloudy (A) Clear    pH, UA 5.5 5.0 - 9.0    Specific Gravity, UA 1.028 1.003 - 1.030    Glucose, UA Negative Negative    Ketones, UA Negative Negative    Bilirubin, UA Small (1+) (A) Negative    Blood, UA Negative Negative    Protein, UA Trace (A) Negative    Leuk Esterase, UA Negative Negative    Nitrite, UA Negative Negative    Urobilinogen, UA 0.2 E.U./dL 0.2 - 1.0 E.U./dL   CBC Auto Differential   Result Value Ref Range    WBC 4.40 3.40 - 10.80 10*3/mm3    RBC 3.72 (L) 3.77 - 5.28 10*6/mm3    Hemoglobin 11.2 (L) 12.0 - 15.9 g/dL    Hematocrit 34.1 34.0 - 46.6 %    MCV 91.7 79.0 - 97.0 fL    MCH 30.1 26.6 - 33.0 pg    MCHC 32.8 31.5 - 35.7 g/dL    RDW 13.2 12.3 - 15.4 %    RDW-SD 44.3 37.0 - 54.0 fl    MPV 9.1 6.0 - 12.0 fL    Platelets 224 140 - 450 10*3/mm3    Neutrophil % 41.6 (L) 42.7 - 76.0 %    Lymphocyte % 46.6 (H) 19.6 - 45.3 %    Monocyte % 7.0 5.0 - 12.0 %    Eosinophil % 3.0 0.3 - 6.2 %    Basophil % 0.7 0.0 - 1.5 %    Immature Grans % 1.1 (H) 0.0 - 0.5 %    Neutrophils, Absolute 1.83 1.40 - 7.00 10*3/mm3    Lymphocytes, Absolute 2.05 0.70 - 3.10 10*3/mm3    Monocytes, Absolute 0.31 0.10 - 0.90 10*3/mm3    Eosinophils, Absolute 0.13 0.00 - 0.40 10*3/mm3    Basophils, Absolute 0.03 0.00 - 0.20 10*3/mm3    Immature Grans, Absolute 0.05 0.00 - 0.05 10*3/mm3    nRBC 0.0 0.0 - 0.0 /100 WBC   CBC Auto Differential   Result Value Ref Range    WBC 5.09 3.40 - 10.80 10*3/mm3    RBC 4.05 3.77 - 5.28 10*6/mm3    Hemoglobin 11.9 (L) 12.0 - 15.9 g/dL    Hematocrit 36.5 34.0 - 46.6 %    MCV 90.1 79.0 - 97.0 fL    MCH 29.4 26.6 - 33.0 pg    MCHC 32.6 31.5 - 35.7 g/dL    RDW 13.1 12.3 - 15.4 %    RDW-SD 43.3 37.0 - 54.0 fl    MPV 9.3 6.0 - 12.0 fL    Platelets 204 140 - 450 10*3/mm3    Neutrophil % 58.7 42.7 - 76.0 %    Lymphocyte % 30.8 19.6 -  45.3 %    Monocyte % 6.1 5.0 - 12.0 %    Eosinophil % 2.6 0.3 - 6.2 %    Basophil % 0.4 0.0 - 1.5 %    Immature Grans % 1.4 (H) 0.0 - 0.5 %    Neutrophils, Absolute 2.99 1.40 - 7.00 10*3/mm3    Lymphocytes, Absolute 1.57 0.70 - 3.10 10*3/mm3    Monocytes, Absolute 0.31 0.10 - 0.90 10*3/mm3    Eosinophils, Absolute 0.13 0.00 - 0.40 10*3/mm3    Basophils, Absolute 0.02 0.00 - 0.20 10*3/mm3    Immature Grans, Absolute 0.07 (H) 0.00 - 0.05 10*3/mm3    nRBC 0.0 0.0 - 0.0 /100 WBC   CBC Auto Differential   Result Value Ref Range    WBC 12.16 (H) 3.40 - 10.80 10*3/mm3    RBC 4.93 3.77 - 5.28 10*6/mm3    Hemoglobin 14.8 12.0 - 15.9 g/dL    Hematocrit 45.8 34.0 - 46.6 %    MCV 92.9 79.0 - 97.0 fL    MCH 30.0 26.6 - 33.0 pg    MCHC 32.3 31.5 - 35.7 g/dL    RDW 13.5 12.3 - 15.4 %    RDW-SD 46.0 37.0 - 54.0 fl    MPV 8.7 6.0 - 12.0 fL    Platelets 300 140 - 450 10*3/mm3    Neutrophil % 87.8 (H) 42.7 - 76.0 %    Lymphocyte % 8.1 (L) 19.6 - 45.3 %    Monocyte % 2.0 (L) 5.0 - 12.0 %    Eosinophil % 0.2 (L) 0.3 - 6.2 %    Basophil % 0.4 0.0 - 1.5 %    Immature Grans % 1.5 (H) 0.0 - 0.5 %    Neutrophils, Absolute 10.68 (H) 1.40 - 7.00 10*3/mm3    Lymphocytes, Absolute 0.99 0.70 - 3.10 10*3/mm3    Monocytes, Absolute 0.24 0.10 - 0.90 10*3/mm3    Eosinophils, Absolute 0.02 0.00 - 0.40 10*3/mm3    Basophils, Absolute 0.05 0.00 - 0.20 10*3/mm3    Immature Grans, Absolute 0.18 (H) 0.00 - 0.05 10*3/mm3    nRBC 0.0 0.0 - 0.0 /100 WBC     *Note: Due to a large number of results and/or encounters for the requested time period, some results have not been displayed. A complete set of results can be found in Results Review.

## 2020-04-15 ENCOUNTER — APPOINTMENT (OUTPATIENT)
Dept: CT IMAGING | Facility: HOSPITAL | Age: 40
End: 2020-04-15

## 2020-05-21 DIAGNOSIS — R10.84 GENERALIZED ABDOMINAL PAIN: Primary | ICD-10-CM

## 2020-05-26 ENCOUNTER — APPOINTMENT (OUTPATIENT)
Dept: CT IMAGING | Facility: HOSPITAL | Age: 40
End: 2020-05-26

## 2020-05-26 ENCOUNTER — TELEPHONE (OUTPATIENT)
Dept: GENERAL RADIOLOGY | Facility: HOSPITAL | Age: 40
End: 2020-05-26

## 2020-05-26 NOTE — TELEPHONE ENCOUNTER
Please contact patient and reschedule if needed.  Marilou Reed     PT WAS A NO SHOW FOR CT APPT ON [id number]

## 2020-05-27 ENCOUNTER — TELEPHONE (OUTPATIENT)
Dept: ENDOCRINOLOGY | Facility: CLINIC | Age: 40
End: 2020-05-27

## 2020-05-27 NOTE — TELEPHONE ENCOUNTER
Contacted patient regarding missed CT appointment on 5-. Patient sated she would like to reschedule and was transferred to radiology department.

## 2020-05-28 ENCOUNTER — APPOINTMENT (OUTPATIENT)
Dept: GENERAL RADIOLOGY | Facility: HOSPITAL | Age: 40
End: 2020-05-28

## 2020-05-28 ENCOUNTER — APPOINTMENT (OUTPATIENT)
Dept: CT IMAGING | Facility: HOSPITAL | Age: 40
End: 2020-05-28

## 2020-05-28 ENCOUNTER — HOSPITAL ENCOUNTER (OUTPATIENT)
Facility: HOSPITAL | Age: 40
Setting detail: OBSERVATION
Discharge: HOME | End: 2020-05-29
Attending: EMERGENCY MEDICINE | Admitting: FAMILY MEDICINE

## 2020-05-28 DIAGNOSIS — R07.9 CHEST PAIN, UNSPECIFIED TYPE: Primary | ICD-10-CM

## 2020-05-28 PROBLEM — J18.9 PNEUMONIA DUE TO INFECTIOUS ORGANISM: Status: ACTIVE | Noted: 2020-05-28

## 2020-05-28 LAB
ALBUMIN SERPL-MCNC: 3.8 G/DL (ref 3.5–5.2)
ALBUMIN/GLOB SERPL: 1.6 G/DL
ALP SERPL-CCNC: 106 U/L (ref 39–117)
ALT SERPL W P-5'-P-CCNC: 51 U/L (ref 1–33)
ANION GAP SERPL CALCULATED.3IONS-SCNC: 8 MMOL/L (ref 5–15)
AST SERPL-CCNC: 46 U/L (ref 1–32)
BASOPHILS # BLD AUTO: 0.04 10*3/MM3 (ref 0–0.2)
BASOPHILS NFR BLD AUTO: 0.7 % (ref 0–1.5)
BILIRUB SERPL-MCNC: 0.3 MG/DL (ref 0.2–1.2)
BUN BLD-MCNC: 6 MG/DL (ref 6–20)
BUN/CREAT SERPL: 7.5 (ref 7–25)
CALCIUM SPEC-SCNC: 8.8 MG/DL (ref 8.6–10.5)
CHLORIDE SERPL-SCNC: 105 MMOL/L (ref 98–107)
CO2 SERPL-SCNC: 25 MMOL/L (ref 22–29)
CREAT BLD-MCNC: 0.8 MG/DL (ref 0.57–1)
D-DIMER, QUANTITATIVE (MAD,POW, STR): 722 NG/ML (FEU) (ref 0–470)
DEPRECATED RDW RBC AUTO: 44.9 FL (ref 37–54)
EOSINOPHIL # BLD AUTO: 0.15 10*3/MM3 (ref 0–0.4)
EOSINOPHIL NFR BLD AUTO: 2.7 % (ref 0.3–6.2)
ERYTHROCYTE [DISTWIDTH] IN BLOOD BY AUTOMATED COUNT: 13.1 % (ref 12.3–15.4)
GFR SERPL CREATININE-BSD FRML MDRD: 80 ML/MIN/1.73
GLOBULIN UR ELPH-MCNC: 2.4 GM/DL
GLUCOSE BLD-MCNC: 121 MG/DL (ref 65–99)
HCG SERPL QL: NEGATIVE
HCT VFR BLD AUTO: 46.1 % (ref 34–46.6)
HGB BLD-MCNC: 15.4 G/DL (ref 12–15.9)
HOLD SPECIMEN: NORMAL
IMM GRANULOCYTES # BLD AUTO: 0.03 10*3/MM3 (ref 0–0.05)
IMM GRANULOCYTES NFR BLD AUTO: 0.5 % (ref 0–0.5)
LYMPHOCYTES # BLD AUTO: 2.25 10*3/MM3 (ref 0.7–3.1)
LYMPHOCYTES NFR BLD AUTO: 39.8 % (ref 19.6–45.3)
MCH RBC QN AUTO: 31.8 PG (ref 26.6–33)
MCHC RBC AUTO-ENTMCNC: 33.4 G/DL (ref 31.5–35.7)
MCV RBC AUTO: 95.1 FL (ref 79–97)
MONOCYTES # BLD AUTO: 0.34 10*3/MM3 (ref 0.1–0.9)
MONOCYTES NFR BLD AUTO: 6 % (ref 5–12)
NEUTROPHILS # BLD AUTO: 2.84 10*3/MM3 (ref 1.7–7)
NEUTROPHILS NFR BLD AUTO: 50.3 % (ref 42.7–76)
NRBC BLD AUTO-RTO: 0 /100 WBC (ref 0–0.2)
NT-PROBNP SERPL-MCNC: 168.6 PG/ML (ref 5–450)
PLATELET # BLD AUTO: 255 10*3/MM3 (ref 140–450)
PMV BLD AUTO: 8.6 FL (ref 6–12)
POTASSIUM BLD-SCNC: 3.6 MMOL/L (ref 3.5–5.2)
PROT SERPL-MCNC: 6.2 G/DL (ref 6–8.5)
RBC # BLD AUTO: 4.85 10*6/MM3 (ref 3.77–5.28)
SARS-COV-2 RNA RESP QL NAA+PROBE: NOT DETECTED
SODIUM BLD-SCNC: 138 MMOL/L (ref 136–145)
TROPONIN T SERPL-MCNC: <0.01 NG/ML (ref 0–0.03)
WBC NRBC COR # BLD: 5.65 10*3/MM3 (ref 3.4–10.8)
WHOLE BLOOD HOLD SPECIMEN: NORMAL

## 2020-05-28 PROCEDURE — 93010 ELECTROCARDIOGRAM REPORT: CPT | Performed by: INTERNAL MEDICINE

## 2020-05-28 PROCEDURE — 87635 SARS-COV-2 COVID-19 AMP PRB: CPT | Performed by: EMERGENCY MEDICINE

## 2020-05-28 PROCEDURE — G0378 HOSPITAL OBSERVATION PER HR: HCPCS

## 2020-05-28 PROCEDURE — 93005 ELECTROCARDIOGRAM TRACING: CPT | Performed by: EMERGENCY MEDICINE

## 2020-05-28 PROCEDURE — 25010000002 MORPHINE PER 10 MG: Performed by: EMERGENCY MEDICINE

## 2020-05-28 PROCEDURE — 83880 ASSAY OF NATRIURETIC PEPTIDE: CPT | Performed by: EMERGENCY MEDICINE

## 2020-05-28 PROCEDURE — 85025 COMPLETE CBC W/AUTO DIFF WBC: CPT | Performed by: EMERGENCY MEDICINE

## 2020-05-28 PROCEDURE — 96372 THER/PROPH/DIAG INJ SC/IM: CPT

## 2020-05-28 PROCEDURE — 84484 ASSAY OF TROPONIN QUANT: CPT

## 2020-05-28 PROCEDURE — 93005 ELECTROCARDIOGRAM TRACING: CPT | Performed by: FAMILY MEDICINE

## 2020-05-28 PROCEDURE — 71275 CT ANGIOGRAPHY CHEST: CPT

## 2020-05-28 PROCEDURE — 71045 X-RAY EXAM CHEST 1 VIEW: CPT

## 2020-05-28 PROCEDURE — 84703 CHORIONIC GONADOTROPIN ASSAY: CPT | Performed by: EMERGENCY MEDICINE

## 2020-05-28 PROCEDURE — 36415 COLL VENOUS BLD VENIPUNCTURE: CPT

## 2020-05-28 PROCEDURE — 84484 ASSAY OF TROPONIN QUANT: CPT | Performed by: EMERGENCY MEDICINE

## 2020-05-28 PROCEDURE — 80053 COMPREHEN METABOLIC PANEL: CPT | Performed by: EMERGENCY MEDICINE

## 2020-05-28 PROCEDURE — 99285 EMERGENCY DEPT VISIT HI MDM: CPT

## 2020-05-28 PROCEDURE — 25010000002 ENOXAPARIN PER 10 MG: Performed by: FAMILY MEDICINE

## 2020-05-28 PROCEDURE — 0 IOPAMIDOL PER 1 ML: Performed by: EMERGENCY MEDICINE

## 2020-05-28 PROCEDURE — 85379 FIBRIN DEGRADATION QUANT: CPT | Performed by: EMERGENCY MEDICINE

## 2020-05-28 PROCEDURE — 96374 THER/PROPH/DIAG INJ IV PUSH: CPT

## 2020-05-28 RX ORDER — SODIUM CHLORIDE 0.9 % (FLUSH) 0.9 %
10 SYRINGE (ML) INJECTION AS NEEDED
Status: DISCONTINUED | OUTPATIENT
Start: 2020-05-28 | End: 2020-05-29 | Stop reason: HOSPADM

## 2020-05-28 RX ORDER — ALPRAZOLAM 1 MG/1
1 TABLET ORAL 4 TIMES DAILY PRN
Status: DISCONTINUED | OUTPATIENT
Start: 2020-05-28 | End: 2020-05-29 | Stop reason: HOSPADM

## 2020-05-28 RX ORDER — MORPHINE SULFATE 2 MG/ML
1 INJECTION, SOLUTION INTRAMUSCULAR; INTRAVENOUS EVERY 4 HOURS PRN
Status: DISCONTINUED | OUTPATIENT
Start: 2020-05-28 | End: 2020-05-29 | Stop reason: HOSPADM

## 2020-05-28 RX ORDER — LISINOPRIL 10 MG/1
10 TABLET ORAL DAILY
Status: DISCONTINUED | OUTPATIENT
Start: 2020-05-29 | End: 2020-05-29 | Stop reason: HOSPADM

## 2020-05-28 RX ORDER — NITROGLYCERIN 0.4 MG/1
0.4 TABLET SUBLINGUAL
Status: DISCONTINUED | OUTPATIENT
Start: 2020-05-28 | End: 2020-05-29 | Stop reason: HOSPADM

## 2020-05-28 RX ORDER — BUPRENORPHINE HYDROCHLORIDE AND NALOXONE HYDROCHLORIDE DIHYDRATE 8; 2 MG/1; MG/1
1.5 TABLET SUBLINGUAL DAILY
Status: DISCONTINUED | OUTPATIENT
Start: 2020-05-29 | End: 2020-05-29 | Stop reason: HOSPADM

## 2020-05-28 RX ORDER — PANTOPRAZOLE SODIUM 40 MG/1
40 TABLET, DELAYED RELEASE ORAL
Status: DISCONTINUED | OUTPATIENT
Start: 2020-05-29 | End: 2020-05-29 | Stop reason: HOSPADM

## 2020-05-28 RX ORDER — NALOXONE HCL 0.4 MG/ML
0.4 VIAL (ML) INJECTION
Status: DISCONTINUED | OUTPATIENT
Start: 2020-05-28 | End: 2020-05-29 | Stop reason: HOSPADM

## 2020-05-28 RX ORDER — BUPRENORPHINE HYDROCHLORIDE AND NALOXONE HYDROCHLORIDE DIHYDRATE 8; 2 MG/1; MG/1
1 TABLET SUBLINGUAL DAILY
Status: DISCONTINUED | OUTPATIENT
Start: 2020-05-28 | End: 2020-05-28 | Stop reason: DRUGHIGH

## 2020-05-28 RX ORDER — ASPIRIN 81 MG/1
81 TABLET ORAL DAILY
Status: DISCONTINUED | OUTPATIENT
Start: 2020-05-29 | End: 2020-05-29 | Stop reason: HOSPADM

## 2020-05-28 RX ORDER — NITROGLYCERIN 0.4 MG/1
0.4 TABLET SUBLINGUAL
Status: DISCONTINUED | OUTPATIENT
Start: 2020-05-28 | End: 2020-05-28 | Stop reason: SDUPTHER

## 2020-05-28 RX ORDER — CITALOPRAM 40 MG/1
40 TABLET ORAL DAILY
Status: DISCONTINUED | OUTPATIENT
Start: 2020-05-29 | End: 2020-05-29 | Stop reason: HOSPADM

## 2020-05-28 RX ORDER — ASPIRIN 325 MG
325 TABLET ORAL ONCE
Status: COMPLETED | OUTPATIENT
Start: 2020-05-28 | End: 2020-05-28

## 2020-05-28 RX ORDER — ASPIRIN 81 MG/1
81 TABLET, CHEWABLE ORAL ONCE
Status: DISCONTINUED | OUTPATIENT
Start: 2020-05-28 | End: 2020-05-28 | Stop reason: SDUPTHER

## 2020-05-28 RX ORDER — DICYCLOMINE HCL 20 MG
20 TABLET ORAL 4 TIMES DAILY
Status: DISCONTINUED | OUTPATIENT
Start: 2020-05-28 | End: 2020-05-29 | Stop reason: HOSPADM

## 2020-05-28 RX ORDER — LEVETIRACETAM 500 MG/1
500 TABLET ORAL EVERY 12 HOURS SCHEDULED
Status: DISCONTINUED | OUTPATIENT
Start: 2020-05-28 | End: 2020-05-29 | Stop reason: HOSPADM

## 2020-05-28 RX ORDER — AMOXICILLIN AND CLAVULANATE POTASSIUM 875; 125 MG/1; MG/1
1 TABLET, FILM COATED ORAL EVERY 12 HOURS SCHEDULED
Status: DISCONTINUED | OUTPATIENT
Start: 2020-05-28 | End: 2020-05-29 | Stop reason: HOSPADM

## 2020-05-28 RX ORDER — SODIUM CHLORIDE 0.9 % (FLUSH) 0.9 %
10 SYRINGE (ML) INJECTION EVERY 12 HOURS SCHEDULED
Status: DISCONTINUED | OUTPATIENT
Start: 2020-05-28 | End: 2020-05-29 | Stop reason: HOSPADM

## 2020-05-28 RX ORDER — ATORVASTATIN CALCIUM 40 MG/1
40 TABLET, FILM COATED ORAL NIGHTLY
Status: DISCONTINUED | OUTPATIENT
Start: 2020-05-28 | End: 2020-05-29 | Stop reason: HOSPADM

## 2020-05-28 RX ORDER — GABAPENTIN 300 MG/1
600 CAPSULE ORAL EVERY 8 HOURS SCHEDULED
Status: DISCONTINUED | OUTPATIENT
Start: 2020-05-28 | End: 2020-05-29 | Stop reason: HOSPADM

## 2020-05-28 RX ORDER — ALBUTEROL SULFATE 90 UG/1
2 AEROSOL, METERED RESPIRATORY (INHALATION) EVERY 6 HOURS PRN
Status: DISCONTINUED | OUTPATIENT
Start: 2020-05-28 | End: 2020-05-29 | Stop reason: HOSPADM

## 2020-05-28 RX ADMIN — MORPHINE SULFATE 4 MG: 4 INJECTION, SOLUTION INTRAMUSCULAR; INTRAVENOUS at 14:54

## 2020-05-28 RX ADMIN — ATORVASTATIN CALCIUM 40 MG: 40 TABLET, FILM COATED ORAL at 20:13

## 2020-05-28 RX ADMIN — IOPAMIDOL 53 ML: 755 INJECTION, SOLUTION INTRAVENOUS at 14:38

## 2020-05-28 RX ADMIN — ASPIRIN 325 MG: 325 TABLET, COATED ORAL at 14:54

## 2020-05-28 RX ADMIN — AMOXICILLIN AND CLAVULANATE POTASSIUM 1 TABLET: 875; 125 TABLET, FILM COATED ORAL at 20:12

## 2020-05-28 RX ADMIN — ALPRAZOLAM 1 MG: 1 TABLET ORAL at 20:13

## 2020-05-28 RX ADMIN — GABAPENTIN 600 MG: 300 CAPSULE ORAL at 20:13

## 2020-05-28 RX ADMIN — SODIUM CHLORIDE, PRESERVATIVE FREE 10 ML: 5 INJECTION INTRAVENOUS at 20:13

## 2020-05-28 RX ADMIN — DICYCLOMINE HYDROCHLORIDE 20 MG: 20 TABLET ORAL at 20:14

## 2020-05-28 RX ADMIN — ENOXAPARIN SODIUM 40 MG: 40 INJECTION SUBCUTANEOUS at 20:12

## 2020-05-29 ENCOUNTER — APPOINTMENT (OUTPATIENT)
Dept: NUCLEAR MEDICINE | Facility: HOSPITAL | Age: 40
End: 2020-05-29

## 2020-05-29 VITALS
TEMPERATURE: 96.8 F | OXYGEN SATURATION: 95 % | BODY MASS INDEX: 41.16 KG/M2 | SYSTOLIC BLOOD PRESSURE: 124 MMHG | HEART RATE: 72 BPM | WEIGHT: 209.66 LBS | DIASTOLIC BLOOD PRESSURE: 72 MMHG | HEIGHT: 60 IN | RESPIRATION RATE: 18 BRPM

## 2020-05-29 LAB
ANION GAP SERPL CALCULATED.3IONS-SCNC: 9 MMOL/L (ref 5–15)
BUN BLD-MCNC: 9 MG/DL (ref 6–20)
BUN/CREAT SERPL: 11.1 (ref 7–25)
CALCIUM SPEC-SCNC: 8.8 MG/DL (ref 8.6–10.5)
CHLORIDE SERPL-SCNC: 105 MMOL/L (ref 98–107)
CHOLEST SERPL-MCNC: 203 MG/DL (ref 0–200)
CO2 SERPL-SCNC: 26 MMOL/L (ref 22–29)
CREAT BLD-MCNC: 0.81 MG/DL (ref 0.57–1)
DEPRECATED RDW RBC AUTO: 46 FL (ref 37–54)
ERYTHROCYTE [DISTWIDTH] IN BLOOD BY AUTOMATED COUNT: 13 % (ref 12.3–15.4)
GFR SERPL CREATININE-BSD FRML MDRD: 79 ML/MIN/1.73
GLUCOSE BLD-MCNC: 118 MG/DL (ref 65–99)
HBA1C MFR BLD: 5.3 % (ref 4.8–5.6)
HCT VFR BLD AUTO: 42.4 % (ref 34–46.6)
HDLC SERPL-MCNC: 22 MG/DL (ref 40–60)
HGB BLD-MCNC: 14.1 G/DL (ref 12–15.9)
LDLC SERPL CALC-MCNC: 109 MG/DL (ref 0–100)
LDLC/HDLC SERPL: 4.95 {RATIO}
MCH RBC QN AUTO: 31.8 PG (ref 26.6–33)
MCHC RBC AUTO-ENTMCNC: 33.3 G/DL (ref 31.5–35.7)
MCV RBC AUTO: 95.7 FL (ref 79–97)
PLATELET # BLD AUTO: 225 10*3/MM3 (ref 140–450)
PMV BLD AUTO: 9.2 FL (ref 6–12)
POTASSIUM BLD-SCNC: 3.5 MMOL/L (ref 3.5–5.2)
RBC # BLD AUTO: 4.43 10*6/MM3 (ref 3.77–5.28)
SODIUM BLD-SCNC: 140 MMOL/L (ref 136–145)
TRIGL SERPL-MCNC: 361 MG/DL (ref 0–150)
TROPONIN T SERPL-MCNC: <0.01 NG/ML (ref 0–0.03)
TSH SERPL DL<=0.05 MIU/L-ACNC: 1.18 UIU/ML (ref 0.27–4.2)
VLDLC SERPL-MCNC: 72.2 MG/DL
WBC NRBC COR # BLD: 4.35 10*3/MM3 (ref 3.4–10.8)

## 2020-05-29 PROCEDURE — G0378 HOSPITAL OBSERVATION PER HR: HCPCS

## 2020-05-29 PROCEDURE — 84443 ASSAY THYROID STIM HORMONE: CPT | Performed by: FAMILY MEDICINE

## 2020-05-29 PROCEDURE — 93005 ELECTROCARDIOGRAM TRACING: CPT | Performed by: FAMILY MEDICINE

## 2020-05-29 PROCEDURE — 78452 HT MUSCLE IMAGE SPECT MULT: CPT

## 2020-05-29 PROCEDURE — 85027 COMPLETE CBC AUTOMATED: CPT | Performed by: FAMILY MEDICINE

## 2020-05-29 PROCEDURE — 93017 CV STRESS TEST TRACING ONLY: CPT

## 2020-05-29 PROCEDURE — 0 TECHNETIUM SESTAMIBI: Performed by: FAMILY MEDICINE

## 2020-05-29 PROCEDURE — 80061 LIPID PANEL: CPT | Performed by: FAMILY MEDICINE

## 2020-05-29 PROCEDURE — 93010 ELECTROCARDIOGRAM REPORT: CPT | Performed by: INTERNAL MEDICINE

## 2020-05-29 PROCEDURE — 78452 HT MUSCLE IMAGE SPECT MULT: CPT | Performed by: INTERNAL MEDICINE

## 2020-05-29 PROCEDURE — 93018 CV STRESS TEST I&R ONLY: CPT | Performed by: INTERNAL MEDICINE

## 2020-05-29 PROCEDURE — 83036 HEMOGLOBIN GLYCOSYLATED A1C: CPT | Performed by: FAMILY MEDICINE

## 2020-05-29 PROCEDURE — 84484 ASSAY OF TROPONIN QUANT: CPT

## 2020-05-29 PROCEDURE — 80048 BASIC METABOLIC PNL TOTAL CA: CPT | Performed by: FAMILY MEDICINE

## 2020-05-29 PROCEDURE — A9500 TC99M SESTAMIBI: HCPCS | Performed by: FAMILY MEDICINE

## 2020-05-29 PROCEDURE — 93016 CV STRESS TEST SUPVJ ONLY: CPT | Performed by: INTERNAL MEDICINE

## 2020-05-29 RX ORDER — ATORVASTATIN CALCIUM 40 MG/1
40 TABLET, FILM COATED ORAL NIGHTLY
Qty: 30 TABLET | Refills: 0 | Status: SHIPPED | OUTPATIENT
Start: 2020-05-29 | End: 2020-06-28

## 2020-05-29 RX ORDER — LISINOPRIL 10 MG/1
10 TABLET ORAL DAILY
Start: 2020-05-29

## 2020-05-29 RX ORDER — AMOXICILLIN AND CLAVULANATE POTASSIUM 875; 125 MG/1; MG/1
1 TABLET, FILM COATED ORAL EVERY 12 HOURS SCHEDULED
Qty: 12 TABLET | Refills: 0 | Status: SHIPPED | OUTPATIENT
Start: 2020-05-29 | End: 2020-06-04

## 2020-05-29 RX ADMIN — DICYCLOMINE HYDROCHLORIDE 20 MG: 20 TABLET ORAL at 11:47

## 2020-05-29 RX ADMIN — AMOXICILLIN AND CLAVULANATE POTASSIUM 1 TABLET: 875; 125 TABLET, FILM COATED ORAL at 08:26

## 2020-05-29 RX ADMIN — LEVETIRACETAM 500 MG: 500 TABLET ORAL at 08:25

## 2020-05-29 RX ADMIN — CITALOPRAM HYDROBROMIDE 40 MG: 40 TABLET ORAL at 08:25

## 2020-05-29 RX ADMIN — SODIUM CHLORIDE, PRESERVATIVE FREE 10 ML: 5 INJECTION INTRAVENOUS at 08:25

## 2020-05-29 RX ADMIN — ALPRAZOLAM 1 MG: 1 TABLET ORAL at 11:46

## 2020-05-29 RX ADMIN — ASPIRIN 81 MG: 81 TABLET, COATED ORAL at 08:26

## 2020-05-29 RX ADMIN — TECHNETIUM TC 99M SESTAMIBI 1 DOSE: 1 INJECTION INTRAVENOUS at 10:10

## 2020-05-29 RX ADMIN — LISINOPRIL 10 MG: 10 TABLET ORAL at 08:25

## 2020-05-29 RX ADMIN — BUPRENORPHINE HYDROCHLORIDE AND NALOXONE HYDROCHLORIDE DIHYDRATE 1.5 TABLET: 8; 2 TABLET SUBLINGUAL at 08:26

## 2020-05-29 RX ADMIN — DICYCLOMINE HYDROCHLORIDE 20 MG: 20 TABLET ORAL at 08:26

## 2020-06-01 ENCOUNTER — HOSPITAL ENCOUNTER (OUTPATIENT)
Dept: CARDIOLOGY | Facility: HOSPITAL | Age: 40
Discharge: HOME | End: 2020-06-01

## 2020-06-01 ENCOUNTER — HOSPITAL ENCOUNTER (OUTPATIENT)
Dept: NUCLEAR MEDICINE | Facility: HOSPITAL | Age: 40
Discharge: HOME | End: 2020-06-01

## 2020-06-01 LAB
BH CV STRESS BP STAGE 1: NORMAL
BH CV STRESS COMMENTS STAGE 1: NORMAL
BH CV STRESS DOSE REGADENOSON STAGE 1: 0.4
BH CV STRESS DURATION MIN STAGE 1: 0
BH CV STRESS DURATION SEC STAGE 1: 10
BH CV STRESS HR STAGE 1: 85
BH CV STRESS PROTOCOL 1: NORMAL
BH CV STRESS RECOVERY BP: NORMAL MMHG
BH CV STRESS RECOVERY HR: 75 BPM
BH CV STRESS STAGE 1: 1
MAXIMAL PREDICTED HEART RATE: 182 BPM
PERCENT MAX PREDICTED HR: 51.65 %
STRESS BASELINE BP: NORMAL MMHG
STRESS BASELINE HR: 62 BPM
STRESS PERCENT HR: 61 %
STRESS POST ESTIMATED WORKLOAD: 1 METS
STRESS POST PEAK BP: NORMAL MMHG
STRESS POST PEAK HR: 94 BPM
STRESS TARGET HR: 155 BPM

## 2020-06-01 PROCEDURE — 25010000002 REGADENOSON 0.4 MG/5ML SOLUTION: Performed by: FAMILY MEDICINE

## 2020-06-01 PROCEDURE — A9500 TC99M SESTAMIBI: HCPCS | Performed by: FAMILY MEDICINE

## 2020-06-01 PROCEDURE — 0 TECHNETIUM SESTAMIBI: Performed by: FAMILY MEDICINE

## 2020-06-01 RX ORDER — SODIUM CHLORIDE 0.9 % (FLUSH) 0.9 %
10 SYRINGE (ML) INJECTION ONCE
Status: COMPLETED | OUTPATIENT
Start: 2020-06-01 | End: 2020-06-01

## 2020-06-01 RX ADMIN — TECHNETIUM TC 99M SESTAMIBI 1 DOSE: 1 INJECTION INTRAVENOUS at 08:53

## 2020-06-01 RX ADMIN — SODIUM CHLORIDE, PRESERVATIVE FREE 10 ML: 5 INJECTION INTRAVENOUS at 08:52

## 2020-06-01 RX ADMIN — REGADENOSON 0.4 MG: 0.08 INJECTION, SOLUTION INTRAVENOUS at 08:52

## 2020-06-02 ENCOUNTER — TELEPHONE (OUTPATIENT)
Dept: INTERNAL MEDICINE | Facility: HOSPITAL | Age: 40
End: 2020-06-02

## 2020-06-02 NOTE — TELEPHONE ENCOUNTER
Called patient to give her results of stress test.  She verbalized understanding and requested to see Dr Lubin.  I called Dr. Lubin and discussed patient with him.  He was given patient information and will contact the patient to set up appointment.        This document has been electronically signed by Bam Valdovinos MD on June 2, 2020 10:13

## 2020-06-04 ENCOUNTER — PREP FOR SURGERY (OUTPATIENT)
Dept: OTHER | Facility: HOSPITAL | Age: 40
End: 2020-06-04

## 2020-06-04 DIAGNOSIS — R07.9 CHEST PAIN, UNSPECIFIED TYPE: Primary | ICD-10-CM

## 2020-06-04 DIAGNOSIS — Z01.818 PREOPERATIVE TESTING: Primary | ICD-10-CM

## 2020-06-04 RX ORDER — SODIUM CHLORIDE 0.9 % (FLUSH) 0.9 %
10 SYRINGE (ML) INJECTION AS NEEDED
Status: CANCELLED | OUTPATIENT
Start: 2020-06-08

## 2020-06-04 RX ORDER — SODIUM CHLORIDE 0.9 % (FLUSH) 0.9 %
3 SYRINGE (ML) INJECTION EVERY 12 HOURS SCHEDULED
Status: CANCELLED | OUTPATIENT
Start: 2020-06-08

## 2020-06-04 RX ORDER — SODIUM CHLORIDE 9 MG/ML
75 INJECTION, SOLUTION INTRAVENOUS CONTINUOUS
Status: CANCELLED | OUTPATIENT
Start: 2020-06-08

## 2020-06-05 PROCEDURE — U0003 INFECTIOUS AGENT DETECTION BY NUCLEIC ACID (DNA OR RNA); SEVERE ACUTE RESPIRATORY SYNDROME CORONAVIRUS 2 (SARS-COV-2) (CORONAVIRUS DISEASE [COVID-19]), AMPLIFIED PROBE TECHNIQUE, MAKING USE OF HIGH THROUGHPUT TECHNOLOGIES AS DESCRIBED BY CMS-2020-01-R: HCPCS | Performed by: INTERNAL MEDICINE

## 2020-06-06 LAB
COVID LABCORP PRIORITY: NORMAL
SARS-COV-2 RNA RESP QL NAA+PROBE: NOT DETECTED

## 2020-06-06 NOTE — H&P
Cardiology History and Physical Note        Patient Name: Tete Chen  Age/Sex: 39 y.o. female  : 1980  MRN: [id number]    Date of Admission : 2020    Primary care Physician: Alfred Stahl APRN    Reason for Admission: Chest pain positive nuclear stress test left heart catheterization.    Subjective:       Chief Complaint: Chest pain.    History of Present Illness:  Tete Chen is a 39 y.o. female     Weight of 209 pounds height of 5 feet with a body mask index 40 with a past medical history significant for arterial hypertension, hypertensive heart disease, hyperlipidemia, obesity, history of seizure disorder, gastroesophageal reflux disease, anxiety and depression, carpal tunnel syndrome, strong family history for atherosclerotic coronary artery disease, chronic back pain, who was recently hospitalized on May 28, 2020 with symptoms of chest pain.  Patient during that hospitalization was ruled out for myocardial infarction.  Patient had an elevated d-dimer and underwent a CTA of the chest with did not reveal of any evidence of any pulmonary embolism.  Patient troponin was negative.  Patient EKG had revealed T wave inversion in lead V2 to V4.  Patient underwent a nuclear Cardiolite stress test.  Patient nuclear Cardiolite stress test was intermediate risk with reversible ischemia in the anterior wall distribution.    Patient since the discharge from the hospital has had recurrent symptoms of substernal chest pain associated with shortness of breath.  Patient also complained of having back pain.  Patient symptoms of chest pain is associated with shortness of breath and diaphoresis.  Patient had taken nitroglycerin with complete resolution of the discomfort.  Patient on further questioning denies any lower extremity edema.  Patient denies any hemoptysis hematuria bright of blood per rectum.  Patient denies any nausea vomiting.  Patient denies any fever chill productive cough.  Patient denies  any episodes of any bleeding diastasis.    Patient 10 point review of system except for what stated in the history of present illness and negative.    Concurrent Medical History:  1.  Chest pain with recent hospitalization.  2.  Positive nuclear Cardiolite stress test for reversible ischemia in the anterior wall distribution.  3.  Arterial hypertension.  4.  Hypertensive heart disease.  5.  Hyperlipidemia.  6.  CTA of the chest which was negative for pulmonary embolism.  7.  Previous history of seizure activity.  8.  Anxiety and depression.  9.  Gastroesophageal reflux disease.  10.  Chronic pain on Suboxone.  11.  Carpal tunnel syndrome.  12.  Obesity with a body mass index of 40.  13.  Family history for coronary artery disease.      Past Surgical History:  1.  Tubal ligation.  2.  Hysterectomy.  3.  Cholecystectomy.  4.  Esophagogastroduodenoscopy.  5.  Colonoscopy.  6.  Hysteroscopy.      Family History: Family history significant for mother who had atherosclerotic coronary artery disease.      Social History: Significant for 1 pack/day tobacco abuse denies any alcohol intake.       Cardiac Risk factor:   1.  Tobacco abuse.  2.  Arterial hypertension.  3.  Obesity.  4.  Hyperlipidemia.  5.  Family history of coronary artery disease.    Allergies:  Allergies   Allergen Reactions   • Ceclor [Cefaclor] Anaphylaxis   • Vancomycin Angioedema   • Reglan [Metoclopramide] Mental Status Change   • Zithromax [Azithromycin] Nausea And Vomiting     z-iam   • Valtrex [Valacyclovir Hcl] Itching       Home Medication::  Prior to Admission medications    Medication Sig Start Date End Date Taking? Authorizing Provider   albuterol (PROVENTIL) (2.5 MG/3ML) 0.083% nebulizer solution  2/15/19   Emergency, Nurse Epic, RN   ALPRAZolam (XANAX) 1 MG tablet Take 1 mg by mouth Every 6 (Six) Hours As Needed.    Provider, MD Heather   atorvastatin (LIPITOR) 40 MG tablet Take 1 tablet by mouth Every Night for 30 days. 5/29/20 6/28/20   Willie Rollins MD   buprenorphine-naloxone (SUBOXONE) 8-2 MG per SL tablet Place  under the tongue Daily. 1 3/4 tablets a day    ProviderHeather MD   citalopram (CeleXA) 20 MG tablet Take 40 mg by mouth Daily. 5/14/19   Emergency, Nurse Epic, RN   dicyclomine (BENTYL) 20 MG tablet Take 20 mg by mouth 4 (Four) Times a Day.    ProviderHeather MD   gabapentin (NEURONTIN) 800 MG tablet Take 1 tablet by mouth 3 (Three) Times a Day.  Patient taking differently: Take 600 mg by mouth 3 (Three) Times a Day. 10/14/16   Erin Devine MD   levETIRAcetam (KEPPRA) 500 MG tablet Take 1 tablet by mouth 2 (Two) Times a Day. 10/14/16   Erin Devine MD   lisinopril (PRINIVIL,ZESTRIL) 10 MG tablet Take 1 tablet by mouth Daily. 5/29/20   Willie Rollins MD   MOVANTIK 25 MG tablet Take 25 mg by mouth Every Morning. 4/29/19   Emergency, Nurse Bren RN   omeprazole (priLOSEC) 40 MG capsule Take 40 mg by mouth Daily. 1/21/20   Emergency, Nurse JOHANNA Correia   ondansetron ODT (ZOFRAN-ODT) 4 MG disintegrating tablet dissolve ONE tablet by MOUTH every 4-6 hours FOR THREE DAYS 2/11/19   ProviderHeather MD   probiotic (CULTURELLE) capsule capsule Take 1 capsule by mouth 2 (Two) Times a Day. 2/21/19   Jonas Quezada,    VENTOLIN  (90 Base) MCG/ACT inhaler  2/15/19   Emergency, Nurse Bren, RN         Review of Systems   Constitutional: Negative for chills, fever and unexpected weight change.   HENT: Negative for hearing loss and nosebleeds.    Eyes: Negative for visual disturbance.   Respiratory: Positive for chest tightness and shortness of breath. Negative for cough and wheezing.    Cardiovascular: Positive for chest pain and palpitations. Negative for leg swelling.   Gastrointestinal: Negative for abdominal pain, blood in stool, constipation, diarrhea, nausea and vomiting.   Endocrine: Negative for cold intolerance, heat intolerance, polydipsia, polyphagia and polyuria.   Genitourinary: Negative for hematuria.    Musculoskeletal: Negative for joint swelling, myalgias and neck pain.   Skin: Negative for color change, rash and wound.   Neurological: Negative for dizziness, seizures, syncope, light-headedness, numbness and headaches.   Hematological: Does not bruise/bleed easily.         Objective:     LMP  (LMP Unknown)   Blood pressure 128/80 heart rate of 80 bpm respiration of 18 oxygen saturation of 98% height of 5 feet weight of 209 pounds with a body mass index of 40.  Physical Exam   Constitutional: She is oriented to person, place, and time. She appears well-developed and well-nourished.   HENT:   Head: Normocephalic and atraumatic.   Left Ear: External ear normal.   Nose: Nose normal.   Mouth/Throat: Oropharynx is clear and moist.   Eyes: Pupils are equal, round, and reactive to light. Conjunctivae and EOM are normal. No scleral icterus.   Neck: Normal range of motion. Neck supple. No JVD present. No tracheal deviation present. No thyromegaly present.   Cardiovascular: Normal rate and regular rhythm.   Pulmonary/Chest: Breath sounds normal. No stridor.   Abdominal: Bowel sounds are normal.   Musculoskeletal: Normal range of motion.   Lymphadenopathy:     She has no cervical adenopathy.   Neurological: She is alert and oriented to person, place, and time. She has normal reflexes.   Skin: Skin is warm and dry.   Psychiatric: She has a normal mood and affect. Her behavior is normal. Judgment and thought content normal.       Lab Review:           Invalid input(s): LABALBU, PROT                                    EKG:   ECG/EMG Results (last 24 hours)     ** No results found for the last 24 hours. **          Imaging:  Imaging Results (Last 24 Hours)     ** No results found for the last 24 hours. **          I personally viewed and interpreted the patient's EKG/Telemetry data.    Assessment:   1.  Chest pain.  2.  Positive nuclear Cardiolite stress test for stress-induced ischemia.  3.  Arterial hypertension.  4.   Hypertensive heart disease.  5.  Hyperlipidemia.  6.  Obesity.          Plan:   1.  Chest pain.  Patient does have history of tobacco abuse arterial hypertension hyperlipidemia and strong family history for coronary artery disease was recently hospitalized with symptoms of chest pain with negative CT of the chest for any evidence of any pulmonary embolism.  Patient underwent a nuclear Cardiolite stress test which revealed anterior wall ischemia.  Patient had electrocardiographic changes in the anterior wall.  Due to the patient positive nuclear Cardiolite stress test with ongoing symptoms of chest pain with risk factor for coronary artery disease patient was recommended a coronary angiogram.  Risk-benefit treatment option for the coronary angiogram were discussed with the patient and an informed consent was obtained.  Patient Mallampati score #2 patient ASA classification 1 #2.  Risk for minimal sedation was also discussed with the patient and an informed consent was obtained.  Plan was to proceed with a coronary angiogram on 6/8/2020.    2.  Arterial hypertension.  Patient blood pressure 128/80.  Patient at the present time has been recommended to decrease her salt intake and to continue with the present dose of the lisinopril.    3.  Hypertensive heart disease.  Clinically at the present time patient is not in congestive heart failure.  Patient has been recommended to undergo transthoracic echocardiogram to evaluate the left ventricular systolic function and to rule out regional segmental wall motion abnormality.    4.  Hyperlipidemia.  Patient has been counseled on low-fat low-cholesterol diet and to continue with the present dose of the Lipitor.    5.  Obesity with a body mass index of 40.  Patient has been counseled on weight reduction lifestyle modification and dietary restriction.  Patient has been explained the risk associated with obesity and the occurrence and progression of atherosclerotic coronary artery  disease and peripheral vascular disease.    6.  Anxiety and depression patient is currently on Celexa.    7.  History of seizure disorder.  Patient is currently on Keppra 500 mg 3 times a day.    8.  Risk factor modification.  Patient has been counseled to quit smoking.  Pharmacological agent to help the patient quit smoking was offered.    9.  Gastroesophageal reflux disease.  Patient is currently on Prilosec 40 mg daily.    10.  Chronic pain.  Patient is currently on Suboxone gabapentin for chronic pain.    The above plan of management were discussed with the patient.      Time: time spent in face-to-face evaluation of greater than 55 minutes and interacting and formulating examining and discussing the plan with the patient with 50% of greater time spent in face-to-face interaction.    Garo Lubin MD  06/06/20  08:25      Dictated utilizing Dragon dictation.

## 2020-06-08 ENCOUNTER — HOSPITAL ENCOUNTER (OUTPATIENT)
Facility: HOSPITAL | Age: 40
Setting detail: HOSPITAL OUTPATIENT SURGERY
Discharge: HOME | End: 2020-06-08
Attending: INTERNAL MEDICINE | Admitting: INTERNAL MEDICINE

## 2020-06-08 VITALS
HEART RATE: 76 BPM | OXYGEN SATURATION: 94 % | TEMPERATURE: 97.1 F | SYSTOLIC BLOOD PRESSURE: 115 MMHG | WEIGHT: 209.22 LBS | DIASTOLIC BLOOD PRESSURE: 77 MMHG | RESPIRATION RATE: 18 BRPM | BODY MASS INDEX: 41.08 KG/M2 | HEIGHT: 60 IN

## 2020-06-08 DIAGNOSIS — R07.9 CHEST PAIN, UNSPECIFIED TYPE: ICD-10-CM

## 2020-06-08 LAB
ANION GAP SERPL CALCULATED.3IONS-SCNC: 11 MMOL/L (ref 5–15)
BASOPHILS # BLD AUTO: 0.06 10*3/MM3 (ref 0–0.2)
BASOPHILS NFR BLD AUTO: 1.1 % (ref 0–1.5)
BUN BLD-MCNC: 6 MG/DL (ref 6–20)
BUN/CREAT SERPL: 7.1 (ref 7–25)
CALCIUM SPEC-SCNC: 9.1 MG/DL (ref 8.6–10.5)
CHLORIDE SERPL-SCNC: 104 MMOL/L (ref 98–107)
CO2 SERPL-SCNC: 24 MMOL/L (ref 22–29)
CREAT BLD-MCNC: 0.85 MG/DL (ref 0.57–1)
DEPRECATED RDW RBC AUTO: 43.5 FL (ref 37–54)
EOSINOPHIL # BLD AUTO: 0.23 10*3/MM3 (ref 0–0.4)
EOSINOPHIL NFR BLD AUTO: 4.1 % (ref 0.3–6.2)
ERYTHROCYTE [DISTWIDTH] IN BLOOD BY AUTOMATED COUNT: 12.6 % (ref 12.3–15.4)
GFR SERPL CREATININE-BSD FRML MDRD: 75 ML/MIN/1.73
GLUCOSE BLD-MCNC: 110 MG/DL (ref 65–99)
HCT VFR BLD AUTO: 43.8 % (ref 34–46.6)
HGB BLD-MCNC: 15 G/DL (ref 12–15.9)
IMM GRANULOCYTES # BLD AUTO: 0.01 10*3/MM3 (ref 0–0.05)
IMM GRANULOCYTES NFR BLD AUTO: 0.2 % (ref 0–0.5)
INR PPP: 0.97 (ref 0.8–1.2)
LYMPHOCYTES # BLD AUTO: 2.09 10*3/MM3 (ref 0.7–3.1)
LYMPHOCYTES NFR BLD AUTO: 37.7 % (ref 19.6–45.3)
MCH RBC QN AUTO: 32.4 PG (ref 26.6–33)
MCHC RBC AUTO-ENTMCNC: 34.2 G/DL (ref 31.5–35.7)
MCV RBC AUTO: 94.6 FL (ref 79–97)
MONOCYTES # BLD AUTO: 0.29 10*3/MM3 (ref 0.1–0.9)
MONOCYTES NFR BLD AUTO: 5.2 % (ref 5–12)
NEUTROPHILS # BLD AUTO: 2.87 10*3/MM3 (ref 1.7–7)
NEUTROPHILS NFR BLD AUTO: 51.7 % (ref 42.7–76)
NRBC BLD AUTO-RTO: 0 /100 WBC (ref 0–0.2)
PLATELET # BLD AUTO: 248 10*3/MM3 (ref 140–450)
PMV BLD AUTO: 8.9 FL (ref 6–12)
POTASSIUM BLD-SCNC: 4.2 MMOL/L (ref 3.5–5.2)
PROTHROMBIN TIME: 12.7 SECONDS (ref 11.1–15.3)
RBC # BLD AUTO: 4.63 10*6/MM3 (ref 3.77–5.28)
SODIUM BLD-SCNC: 139 MMOL/L (ref 136–145)
WBC NRBC COR # BLD: 5.55 10*3/MM3 (ref 3.4–10.8)

## 2020-06-08 PROCEDURE — 93458 L HRT ARTERY/VENTRICLE ANGIO: CPT | Performed by: INTERNAL MEDICINE

## 2020-06-08 PROCEDURE — 85025 COMPLETE CBC W/AUTO DIFF WBC: CPT | Performed by: INTERNAL MEDICINE

## 2020-06-08 PROCEDURE — C1769 GUIDE WIRE: HCPCS | Performed by: INTERNAL MEDICINE

## 2020-06-08 PROCEDURE — C1894 INTRO/SHEATH, NON-LASER: HCPCS | Performed by: INTERNAL MEDICINE

## 2020-06-08 PROCEDURE — 80048 BASIC METABOLIC PNL TOTAL CA: CPT | Performed by: INTERNAL MEDICINE

## 2020-06-08 PROCEDURE — 25010000002 FENTANYL CITRATE (PF) 100 MCG/2ML SOLUTION: Performed by: INTERNAL MEDICINE

## 2020-06-08 PROCEDURE — 85610 PROTHROMBIN TIME: CPT | Performed by: INTERNAL MEDICINE

## 2020-06-08 PROCEDURE — 25010000002 MIDAZOLAM PER 1 MG: Performed by: INTERNAL MEDICINE

## 2020-06-08 PROCEDURE — 0 IOPAMIDOL PER 1 ML: Performed by: INTERNAL MEDICINE

## 2020-06-08 PROCEDURE — C1760 CLOSURE DEV, VASC: HCPCS | Performed by: INTERNAL MEDICINE

## 2020-06-08 RX ORDER — LIDOCAINE HYDROCHLORIDE 20 MG/ML
INJECTION, SOLUTION INFILTRATION; PERINEURAL AS NEEDED
Status: DISCONTINUED | OUTPATIENT
Start: 2020-06-08 | End: 2020-06-08 | Stop reason: HOSPADM

## 2020-06-08 RX ORDER — SODIUM CHLORIDE 0.9 % (FLUSH) 0.9 %
10 SYRINGE (ML) INJECTION AS NEEDED
Status: DISCONTINUED | OUTPATIENT
Start: 2020-06-08 | End: 2020-06-08 | Stop reason: HOSPADM

## 2020-06-08 RX ORDER — SODIUM CHLORIDE 9 MG/ML
75 INJECTION, SOLUTION INTRAVENOUS CONTINUOUS
Status: DISCONTINUED | OUTPATIENT
Start: 2020-06-08 | End: 2020-06-08

## 2020-06-08 RX ORDER — MIDAZOLAM HYDROCHLORIDE 1 MG/ML
INJECTION INTRAMUSCULAR; INTRAVENOUS AS NEEDED
Status: DISCONTINUED | OUTPATIENT
Start: 2020-06-08 | End: 2020-06-08 | Stop reason: HOSPADM

## 2020-06-08 RX ORDER — SODIUM CHLORIDE 9 MG/ML
100 INJECTION, SOLUTION INTRAVENOUS CONTINUOUS
Status: DISCONTINUED | OUTPATIENT
Start: 2020-06-08 | End: 2020-06-08 | Stop reason: HOSPADM

## 2020-06-08 RX ORDER — ISOSORBIDE DINITRATE 30 MG/1
30 TABLET ORAL DAILY
COMMUNITY

## 2020-06-08 RX ORDER — SODIUM CHLORIDE 0.9 % (FLUSH) 0.9 %
3 SYRINGE (ML) INJECTION EVERY 12 HOURS SCHEDULED
Status: DISCONTINUED | OUTPATIENT
Start: 2020-06-08 | End: 2020-06-08 | Stop reason: HOSPADM

## 2020-06-08 RX ORDER — ACETAMINOPHEN 325 MG/1
650 TABLET ORAL EVERY 4 HOURS PRN
Status: CANCELLED | OUTPATIENT
Start: 2020-06-08

## 2020-06-08 RX ORDER — FENTANYL CITRATE 50 UG/ML
INJECTION, SOLUTION INTRAMUSCULAR; INTRAVENOUS AS NEEDED
Status: DISCONTINUED | OUTPATIENT
Start: 2020-06-08 | End: 2020-06-08 | Stop reason: HOSPADM

## 2020-06-08 RX ADMIN — SODIUM CHLORIDE 75 ML/HR: 9 INJECTION, SOLUTION INTRAVENOUS at 10:36

## 2020-06-10 ENCOUNTER — TELEPHONE (OUTPATIENT)
Dept: GENERAL RADIOLOGY | Facility: HOSPITAL | Age: 40
End: 2020-06-10

## 2020-06-10 NOTE — TELEPHONE ENCOUNTER
Patient was a no show for appointment on 06/10/2020 at 9:00am for a CT Abdomen Pelvis With contrast   
complains of pain/discomfort

## 2020-09-30 ENCOUNTER — OFFICE VISIT (OUTPATIENT)
Dept: GASTROENTEROLOGY | Facility: CLINIC | Age: 40
End: 2020-09-30

## 2020-09-30 ENCOUNTER — LAB (OUTPATIENT)
Dept: LAB | Facility: HOSPITAL | Age: 40
End: 2020-09-30

## 2020-09-30 VITALS
BODY MASS INDEX: 40.56 KG/M2 | HEART RATE: 84 BPM | SYSTOLIC BLOOD PRESSURE: 149 MMHG | HEIGHT: 60 IN | DIASTOLIC BLOOD PRESSURE: 91 MMHG | WEIGHT: 206.6 LBS

## 2020-09-30 DIAGNOSIS — R10.84 GENERALIZED ABDOMINAL PAIN: Primary | ICD-10-CM

## 2020-09-30 DIAGNOSIS — R19.7 DIARRHEA, UNSPECIFIED TYPE: ICD-10-CM

## 2020-09-30 DIAGNOSIS — R10.84 GENERALIZED ABDOMINAL PAIN: ICD-10-CM

## 2020-09-30 PROBLEM — K21.9 GASTROESOPHAGEAL REFLUX DISEASE: Status: ACTIVE | Noted: 2020-09-30

## 2020-09-30 PROBLEM — F11.21 OPIOID DEPENDENCE IN REMISSION: Status: ACTIVE | Noted: 2020-09-30

## 2020-09-30 PROBLEM — F33.0 MILD RECURRENT MAJOR DEPRESSION: Status: ACTIVE | Noted: 2020-09-30

## 2020-09-30 LAB
ALBUMIN SERPL-MCNC: 4 G/DL (ref 3.5–5.2)
ALBUMIN/GLOB SERPL: 1.4 G/DL
ALP SERPL-CCNC: 95 U/L (ref 39–117)
ALT SERPL W P-5'-P-CCNC: 51 U/L (ref 1–33)
ANION GAP SERPL CALCULATED.3IONS-SCNC: 10.6 MMOL/L (ref 5–15)
AST SERPL-CCNC: 36 U/L (ref 1–32)
BILIRUB SERPL-MCNC: 0.3 MG/DL (ref 0–1.2)
BUN SERPL-MCNC: 4 MG/DL (ref 6–20)
BUN/CREAT SERPL: 5.2 (ref 7–25)
CALCIUM SPEC-SCNC: 9.4 MG/DL (ref 8.6–10.5)
CHLORIDE SERPL-SCNC: 104 MMOL/L (ref 98–107)
CO2 SERPL-SCNC: 26.4 MMOL/L (ref 22–29)
CREAT SERPL-MCNC: 0.77 MG/DL (ref 0.57–1)
DEPRECATED RDW RBC AUTO: 40.8 FL (ref 37–54)
ERYTHROCYTE [DISTWIDTH] IN BLOOD BY AUTOMATED COUNT: 12.5 % (ref 12.3–15.4)
ERYTHROCYTE [SEDIMENTATION RATE] IN BLOOD: 5 MM/HR (ref 0–20)
GFR SERPL CREATININE-BSD FRML MDRD: 83 ML/MIN/1.73
GLOBULIN UR ELPH-MCNC: 2.9 GM/DL
GLUCOSE SERPL-MCNC: 99 MG/DL (ref 65–99)
HCT VFR BLD AUTO: 38.6 % (ref 34–46.6)
HGB BLD-MCNC: 13.4 G/DL (ref 12–15.9)
MCH RBC QN AUTO: 31.5 PG (ref 26.6–33)
MCHC RBC AUTO-ENTMCNC: 34.7 G/DL (ref 31.5–35.7)
MCV RBC AUTO: 90.8 FL (ref 79–97)
PLATELET # BLD AUTO: 259 10*3/MM3 (ref 140–450)
PMV BLD AUTO: 9.5 FL (ref 6–12)
POTASSIUM SERPL-SCNC: 3.8 MMOL/L (ref 3.5–5.2)
PROT SERPL-MCNC: 6.9 G/DL (ref 6–8.5)
RBC # BLD AUTO: 4.25 10*6/MM3 (ref 3.77–5.28)
SODIUM SERPL-SCNC: 141 MMOL/L (ref 136–145)
WBC # BLD AUTO: 5.16 10*3/MM3 (ref 3.4–10.8)

## 2020-09-30 PROCEDURE — 80053 COMPREHEN METABOLIC PANEL: CPT | Performed by: NURSE PRACTITIONER

## 2020-09-30 PROCEDURE — 85027 COMPLETE CBC AUTOMATED: CPT | Performed by: NURSE PRACTITIONER

## 2020-09-30 PROCEDURE — 99214 OFFICE O/P EST MOD 30 MIN: CPT | Performed by: NURSE PRACTITIONER

## 2020-09-30 PROCEDURE — 85652 RBC SED RATE AUTOMATED: CPT

## 2020-09-30 PROCEDURE — 36415 COLL VENOUS BLD VENIPUNCTURE: CPT | Performed by: NURSE PRACTITIONER

## 2020-09-30 RX ORDER — DICYCLOMINE HCL 20 MG
20 TABLET ORAL 4 TIMES DAILY
Qty: 120 TABLET | Refills: 1 | Status: SHIPPED | OUTPATIENT
Start: 2020-09-30 | End: 2021-05-20 | Stop reason: SDUPTHER

## 2020-09-30 NOTE — PATIENT INSTRUCTIONS
Diarrhea, Adult  Diarrhea is frequent loose and watery bowel movements. Diarrhea can make you feel weak and cause you to become dehydrated. Dehydration can make you tired and thirsty, cause you to have a dry mouth, and decrease how often you urinate.  Diarrhea typically lasts 2-3 days. However, it can last longer if it is a sign of something more serious. It is important to treat your diarrhea as told by your health care provider.  Follow these instructions at home:  Eating and drinking         Follow these recommendations as told by your health care provider:  · Take an oral rehydration solution (ORS). This is an over-the-counter medicine that helps return your body to its normal balance of nutrients and water. It is found at pharmacies and retail stores.  · Drink plenty of fluids, such as water, ice chips, diluted fruit juice, and low-calorie sports drinks. You can drink milk also, if desired.  · Avoid drinking fluids that contain a lot of sugar or caffeine, such as energy drinks, sports drinks, and soda.  · Eat bland, easy-to-digest foods in small amounts as you are able. These foods include bananas, applesauce, rice, lean meats, toast, and crackers.  · Avoid alcohol.  · Avoid spicy or fatty foods.    Medicines  · Take over-the-counter and prescription medicines only as told by your health care provider.  · If you were prescribed an antibiotic medicine, take it as told by your health care provider. Do not stop using the antibiotic even if you start to feel better.  General instructions    · Wash your hands often using soap and water. If soap and water are not available, use a hand . Others in the household should wash their hands as well. Hands should be washed:  ? After using the toilet or changing a diaper.  ? Before preparing, cooking, or serving food.  ? While caring for a sick person or while visiting someone in a hospital.  · Drink enough fluid to keep your urine pale yellow.  · Rest at home while  you recover.  · Watch your condition for any changes.  · Take a warm bath to relieve any burning or pain from frequent diarrhea episodes.  · Keep all follow-up visits as told by your health care provider. This is important.  Contact a health care provider if:  · You have a fever.  · Your diarrhea gets worse.  · You have new symptoms.  · You cannot keep fluids down.  · You feel light-headed or dizzy.  · You have a headache.  · You have muscle cramps.  Get help right away if:  · You have chest pain.  · You feel extremely weak or you faint.  · You have bloody or black stools or stools that look like tar.  · You have severe pain, cramping, or bloating in your abdomen.  · You have trouble breathing or you are breathing very quickly.  · Your heart is beating very quickly.  · Your skin feels cold and clammy.  · You feel confused.  · You have signs of dehydration, such as:  ? Dark urine, very little urine, or no urine.  ? Cracked lips.  ? Dry mouth.  ? Sunken eyes.  ? Sleepiness.  ? Weakness.  Summary  · Diarrhea is frequent loose and watery bowel movements. Diarrhea can make you feel weak and cause you to become dehydrated.  · Drink enough fluids to keep your urine pale yellow.  · Make sure that you wash your hands after using the toilet. If soap and water are not available, use hand .  · Contact a health care provider if your diarrhea gets worse or you have new symptoms.  · Get help right away if you have signs of dehydration.  This information is not intended to replace advice given to you by your health care provider. Make sure you discuss any questions you have with your health care provider.  Document Released: 12/08/2003 Document Revised: 05/05/2020 Document Reviewed: 05/24/2019  [employer] Patient Education © 2020 [employer]

## 2020-09-30 NOTE — PROGRESS NOTES
Chief Complaint   Patient presents with   • Abdominal Pain   • Diarrhea   • Nausea   • Vomiting       Subjective    Tete Chen is a 40 y.o. female. she is here today for follow-up.    40-year-old female presents for follow-up regarding continual abdominal pain and diarrhea that has been worsening over the last few months.  She was seen in March with similar symptoms we scheduled her for a CT however she was unable to complete due to chest pain and had cardiac cath which was clean.  Prior to this she had similar episode of symptoms February 2019 CT noted colitis follow-up colonoscopy was normal with normal biopsies.  She has not taken antibiotics recently.  Previously she would get constipated with Suboxone however now she has had to take Imodium to decrease bowel movements 2-3 times per day.  She is strong family history of Crohn disease in her father and several uncles.    Abdominal Pain  Associated symptoms include diarrhea, flatus, vomiting and weight loss. Pertinent negatives include no arthralgias, constipation, fever, headaches, myalgias or nausea.   Diarrhea   The current episode started more than 1 month ago. The problem occurs 2 to 4 times per day. The problem has been waxing and waning. The stool consistency is described as watery. The patient states that diarrhea awakens her from sleep. Associated symptoms include abdominal pain, bloating, increased flatus, sweats, vomiting and weight loss. Pertinent negatives include no arthralgias, chills, coughing, fever, headaches, myalgias or URI.   Nausea  Associated symptoms include abdominal pain, fatigue and vomiting. Pertinent negatives include no arthralgias, chills, coughing, diaphoresis, fever, headaches, myalgias, nausea or sore throat.   Vomiting   Associated symptoms include abdominal pain, diarrhea, sweats and weight loss. Pertinent negatives include no arthralgias, chills, coughing, fever, headaches, myalgias or URI.            The following  portions of the patient's history were reviewed and updated as appropriate:   Past Medical History:   Diagnosis Date   • Acute maxillary sinusitis    • Adjustment disorder with anxiety    • Adjustment disorder with anxious mood    • Biliary calculus     post cholecystectomy      • Candidiasis    • Central abdominal pain    • Chronic pain    • Community acquired pneumonia    • Dental abscess    • Dental caries     o/e   • Depressive disorder      with some anxiety      • Depressive disorder     not elsewhere classified      • Essential hypertension    • Essential hypertension    • Frequent hospital admissions    • Gastroesophageal reflux disease    • Gastroesophageal reflux disease    • Generalized anxiety disorder    • Hyperlipidemia    • Hyperreflexia    • Influenza     needs immunization   • Irritable bowel syndrome    • Major depressive disorder    • Malaise and fatigue    • Multiple joint pain    • Nausea and vomiting    • Need for prophylactic vaccination against Streptococcus pneumoniae (pneumococcus)    • Obesity    • Rhonchi     low-pitched   • Seizures (CMS/HCC)    • Tobacco dependence syndrome    • Upper respiratory infection      Past Surgical History:   Procedure Laterality Date   • CARDIAC CATHETERIZATION N/A 6/8/2020    Procedure: Left Heart Cath- radial  6/8/20 @ 9;  Surgeon: Garo Lubin MD;  Location: [facility] CATH INVASIVE LOCATION;  Service: Cardiology;  Laterality: N/A;   • CHOLECYSTECTOMY      laparoscopic (With cholangiogram. Symptomatic gallstones.)   06/03/2015    • COLONOSCOPY N/A 2/20/2019    Procedure: COLONOSCOPY;  Surgeon: Virgil Aiken MD;  Location: [facility] ENDOSCOPY;  Service: Gastroenterology   • ENDOSCOPY      w/ biopsy [id number] (Gastritis found inthe body of the stomach. EGD with biopsy.)   07/21/2015    • ENDOSCOPY      w/ tube [id number] (Normal esophagus. Gastritis in stomach. Biopsy taken. Normal duodenum. Biopsy taken.)   10/19/2011    • ENDOSCOPY N/A 11/1/2019    Procedure:  ESOPHAGOGASTRODUODENOSCOPY possible dilation;  Surgeon: Virgil Aiken MD;  Location: [facility] ENDOSCOPY;  Service: Gastroenterology   • HYSTEROSCOPY  07/09/2008   • TOTAL ABDOMINAL HYSTERECTOMY WITH SALPINGO OOPHORECTOMY  09/03/2008   • TUBAL ABDOMINAL LIGATION  08/21/2006   • VAGINAL DELIVERY      , P2, had pre-eclampsia with both     Family History   Problem Relation Age of Onset   • Other Other         depressive disorder   • Diabetes Other    • Hypertension Other    • Other Other         Mother is diabetic, hypertensive, anxiety, depression. Father is 61, diabetic, hypertensive, had a CABG, first MI was at 50. He has had CVA's, TIA's. 10-year-old sister who is Type I diabetic     OB History    No obstetric history on file.       Prior to Admission medications    Medication Sig Start Date End Date Taking? Authorizing Provider   albuterol (PROVENTIL) (2.5 MG/3ML) 0.083% nebulizer solution Take 2.5 mg by nebulization 4 (Four) Times a Day. 2/15/19  Yes Emergency, Nurse JOHANNA Correia   ALPRAZolam (XANAX) 1 MG tablet Take 1 mg by mouth Every 6 (Six) Hours As Needed.   Yes Heather Queen MD   ASPIRIN 81 PO Take 81 mg by mouth Daily.   Yes ProviderHeather MD   buprenorphine-naloxone (SUBOXONE) 8-2 MG per SL tablet Place  under the tongue Daily. 1 3/4 tablets a day   Yes Heather Queen MD   citalopram (CeleXA) 20 MG tablet Take 40 mg by mouth Daily. 5/14/19  Yes Emergency, Nurse Epic, RN   dicyclomine (BENTYL) 20 MG tablet Take 20 mg by mouth 4 (Four) Times a Day.   Yes Heather Queen MD   gabapentin (NEURONTIN) 800 MG tablet Take 1 tablet by mouth 3 (Three) Times a Day.  Patient taking differently: Take 600 mg by mouth 3 (Three) Times a Day. 10/14/16  Yes Erin Devine MD   isosorbide dinitrate (ISORDIL) 30 MG tablet Take 30 mg by mouth Daily.   Yes Heather Queen MD   levETIRAcetam (KEPPRA) 500 MG tablet Take 1 tablet by mouth 2 (Two) Times a Day. 10/14/16  Yes Erin Devine MD      lisinopril (PRINIVIL,ZESTRIL) 10 MG tablet Take 1 tablet by mouth Daily. 5/29/20  Yes Willie Rollins MD   omeprazole (priLOSEC) 40 MG capsule Take 40 mg by mouth Daily. 1/21/20  Yes Emergency, Nurse Bren RN   ondansetron ODT (ZOFRAN-ODT) 4 MG disintegrating tablet dissolve ONE tablet by MOUTH every 4-6 hours FOR THREE DAYS 2/11/19  Yes Provider, MD Heather   probiotic (CULTURELLE) capsule capsule Take 1 capsule by mouth 2 (Two) Times a Day. 2/21/19  Yes Jonas Quezada,    VENTOLIN  (90 Base) MCG/ACT inhaler Inhale 2 puffs Every 4 (Four) Hours As Needed. 2/15/19  Yes Emergency, Nurse Bren, RN   MOVANTIK 25 MG tablet Take 25 mg by mouth Every Morning. 4/29/19   Emergency, Nurse Bren RN     Allergies   Allergen Reactions   • Ceclor [Cefaclor] Anaphylaxis   • Vancomycin Angioedema   • Reglan [Metoclopramide] Mental Status Change   • Zithromax [Azithromycin] Nausea And Vomiting     z-iam   • Valtrex [Valacyclovir Hcl] Itching     Social History     Socioeconomic History   • Marital status:      Spouse name: Not on file   • Number of children: Not on file   • Years of education: Not on file   • Highest education level: Not on file   Tobacco Use   • Smoking status: Current Every Day Smoker     Packs/day: 0.25     Types: Electronic Cigarette, Cigarettes   • Smokeless tobacco: Never Used   Substance and Sexual Activity   • Alcohol use: No   • Drug use: No       Review of Systems  Review of Systems   Constitutional: Positive for fatigue and weight loss. Negative for activity change, appetite change, chills, diaphoresis, fever and unexpected weight change.   HENT: Negative for sore throat and trouble swallowing.    Respiratory: Negative for cough and shortness of breath.    Gastrointestinal: Positive for abdominal distention, abdominal pain, bloating, diarrhea, flatus and vomiting. Negative for anal bleeding, blood in stool, constipation, nausea and rectal pain.   Musculoskeletal: Negative for  "arthralgias and myalgias.   Skin: Negative for pallor.   Neurological: Negative for light-headedness and headaches.        /91 (BP Location: Left arm)   Pulse 84   Ht 152.4 cm (60\")   Wt 93.7 kg (206 lb 9.6 oz)   LMP  (LMP Unknown)   BMI 40.35 kg/m²     Objective    Physical Exam  Constitutional:       General: She is not in acute distress.     Appearance: Normal appearance. She is well-developed.   HENT:      Head: Normocephalic and atraumatic.   Neck:      Musculoskeletal: Normal range of motion and neck supple.      Thyroid: No thyromegaly.   Cardiovascular:      Rate and Rhythm: Normal rate and regular rhythm.      Heart sounds: Normal heart sounds.   Pulmonary:      Effort: Pulmonary effort is normal.      Breath sounds: Normal breath sounds. No wheezing, rhonchi or rales.   Abdominal:      General: Bowel sounds are normal. There is no distension.      Palpations: Abdomen is soft. Abdomen is not rigid.      Tenderness: There is generalized abdominal tenderness. There is no guarding.      Hernia: No hernia is present.   Lymphadenopathy:      Cervical: No cervical adenopathy.   Skin:     General: Skin is warm and dry.      Coloration: Skin is not pale.      Findings: No rash.   Neurological:      Mental Status: She is alert and oriented to person, place, and time.   Psychiatric:         Speech: Speech normal.         Behavior: Behavior is cooperative.       Admission on 06/08/2020, Discharged on 06/08/2020   Component Date Value Ref Range Status   • SARS-CoV-2, LILLY 06/05/2020 Not Detected  Not Detected Final    This test was developed and its performance characteristics determined  by [employer]. This test has not been FDA cleared or  approved. This test has been authorized by FDA under an Emergency Use  Authorization (EUA). This test is only authorized for the duration of  time the declaration that circumstances exist justifying the  authorization of the emergency use of in vitro diagnostic " tests for  detection of SARS-CoV-2 virus and/or diagnosis of COVID-19 infection  under section 564(b)(1) of the Act, 21 U.S.C. 360bbb-3(b)(1), unless  the authorization is terminated or revoked sooner.  When diagnostic testing is negative, the possibility of a false  negative result should be considered in the context of a patient's  recent exposures and the presence of clinical signs and symptoms  consistent with COVID-19. An individual without symptoms of COVID-19  and who is not shedding SARS-CoV-2 virus would expect to have a  negative (not detected) result in this assay.   • COVID [facility] PRIORITY 06/05/2020 Comment   Final    Received   • Glucose 06/08/2020 110* 65 - 99 mg/dL Final   • BUN 06/08/2020 6  6 - 20 mg/dL Final   • Creatinine 06/08/2020 0.85  0.57 - 1.00 mg/dL Final   • Sodium 06/08/2020 139  136 - 145 mmol/L Final   • Potassium 06/08/2020 4.2  3.5 - 5.2 mmol/L Final   • Chloride 06/08/2020 104  98 - 107 mmol/L Final   • CO2 06/08/2020 24.0  22.0 - 29.0 mmol/L Final   • Calcium 06/08/2020 9.1  8.6 - 10.5 mg/dL Final   • eGFR Non African Amer 06/08/2020 75  >60 mL/min/1.73 Final   • BUN/Creatinine Ratio 06/08/2020 7.1  7.0 - 25.0 Final   • Anion Gap 06/08/2020 11.0  5.0 - 15.0 mmol/L Final   • Protime 06/08/2020 12.7  11.1 - 15.3 Seconds Final   • INR 06/08/2020 0.97  0.80 - 1.20 Final   • WBC 06/08/2020 5.55  3.40 - 10.80 10*3/mm3 Final   • RBC 06/08/2020 4.63  3.77 - 5.28 10*6/mm3 Final   • Hemoglobin 06/08/2020 15.0  12.0 - 15.9 g/dL Final   • Hematocrit 06/08/2020 43.8  34.0 - 46.6 % Final   • MCV 06/08/2020 94.6  79.0 - 97.0 fL Final   • MCH 06/08/2020 32.4  26.6 - 33.0 pg Final   • MCHC 06/08/2020 34.2  31.5 - 35.7 g/dL Final   • RDW 06/08/2020 12.6  12.3 - 15.4 % Final   • RDW-SD 06/08/2020 43.5  37.0 - 54.0 fl Final   • MPV 06/08/2020 8.9  6.0 - 12.0 fL Final   • Platelets 06/08/2020 248  140 - 450 10*3/mm3 Final   • Neutrophil % 06/08/2020 51.7  42.7 - 76.0 % Final   • Lymphocyte %  06/08/2020 37.7  19.6 - 45.3 % Final   • Monocyte % 06/08/2020 5.2  5.0 - 12.0 % Final   • Eosinophil % 06/08/2020 4.1  0.3 - 6.2 % Final   • Basophil % 06/08/2020 1.1  0.0 - 1.5 % Final   • Immature Grans % 06/08/2020 0.2  0.0 - 0.5 % Final   • Neutrophils, Absolute 06/08/2020 2.87  1.70 - 7.00 10*3/mm3 Final   • Lymphocytes, Absolute 06/08/2020 2.09  0.70 - 3.10 10*3/mm3 Final   • Monocytes, Absolute 06/08/2020 0.29  0.10 - 0.90 10*3/mm3 Final   • Eosinophils, Absolute 06/08/2020 0.23  0.00 - 0.40 10*3/mm3 Final   • Basophils, Absolute 06/08/2020 0.06  0.00 - 0.20 10*3/mm3 Final   • Immature Grans, Absolute 06/08/2020 0.01  0.00 - 0.05 10*3/mm3 Final   • nRBC 06/08/2020 0.0  0.0 - 0.2 /100 WBC Final     Assessment/Plan      1. Generalized abdominal pain    2. Diarrhea, unspecified type    .   Will obtain lab work and CT abdomen pelvis due to abdominal pain diarrhea concern for recurrent colitis.  Consider capsule endoscopy pending results of the above.    Orders placed during this encounter include:  Orders Placed This Encounter   Procedures   • CT Abdomen Pelvis With Contrast     Standing Status:   Future     Standing Expiration Date:   9/30/2021     Order Specific Question:   Will Oral Contrast be needed for this procedure?     Answer:   Yes     Order Specific Question:   Patient Pregnant     Answer:   No   • CBC (No Diff)   • Comprehensive Metabolic Panel   • Sedimentation Rate     Standing Status:   Future     Standing Expiration Date:   9/30/2021       * Surgery not found *    Review and/or summary of lab tests, radiology, procedures, medications. Review and summary of old records and obtaining of history. The risks and benefits of my recommendations, as well as other treatment options were discussed with the patient today. Questions were answered.    New Medications Ordered This Visit   Medications   • dicyclomine (BENTYL) 20 MG tablet     Sig: Take 1 tablet by mouth 4 (Four) Times a Day.     Dispense:  120  tablet     Refill:  1       Follow-up: Return in about 4 weeks (around 10/28/2020) for Recheck, After test.          This document has been electronically signed by DOLORES Messina on September 30, 2020 15:00 CDT             Results for orders placed or performed during the hospital encounter of 06/08/20   COVID [facility] Priority - Swab, Nasopharynx    Specimen: Nasopharynx; Swab   Result Value Ref Range    COVID [facility] PRIORITY Comment    COVID-19,[facility] ROUTINE, NP/OP SWAB IN TRANSPORT MEDIA OR ESWAB 72 HR TAT - Swab, Nasopharynx    Specimen: Nasopharynx; Swab   Result Value Ref Range    SARS-CoV-2, LILLY Not Detected Not Detected   CBC Auto Differential    Specimen: Blood   Result Value Ref Range    WBC 5.55 3.40 - 10.80 10*3/mm3    RBC 4.63 3.77 - 5.28 10*6/mm3    Hemoglobin 15.0 12.0 - 15.9 g/dL    Hematocrit 43.8 34.0 - 46.6 %    MCV 94.6 79.0 - 97.0 fL    MCH 32.4 26.6 - 33.0 pg    MCHC 34.2 31.5 - 35.7 g/dL    RDW 12.6 12.3 - 15.4 %    RDW-SD 43.5 37.0 - 54.0 fl    MPV 8.9 6.0 - 12.0 fL    Platelets 248 140 - 450 10*3/mm3    Neutrophil % 51.7 42.7 - 76.0 %    Lymphocyte % 37.7 19.6 - 45.3 %    Monocyte % 5.2 5.0 - 12.0 %    Eosinophil % 4.1 0.3 - 6.2 %    Basophil % 1.1 0.0 - 1.5 %    Immature Grans % 0.2 0.0 - 0.5 %    Neutrophils, Absolute 2.87 1.70 - 7.00 10*3/mm3    Lymphocytes, Absolute 2.09 0.70 - 3.10 10*3/mm3    Monocytes, Absolute 0.29 0.10 - 0.90 10*3/mm3    Eosinophils, Absolute 0.23 0.00 - 0.40 10*3/mm3    Basophils, Absolute 0.06 0.00 - 0.20 10*3/mm3    Immature Grans, Absolute 0.01 0.00 - 0.05 10*3/mm3    nRBC 0.0 0.0 - 0.2 /100 WBC   Protime-INR    Specimen: Blood   Result Value Ref Range    Protime 12.7 11.1 - 15.3 Seconds    INR 0.97 0.80 - 1.20   Basic Metabolic Panel    Specimen: Blood   Result Value Ref Range    Glucose 110 (H) 65 - 99 mg/dL    BUN 6 6 - 20 mg/dL    Creatinine 0.85 0.57 - 1.00 mg/dL    Sodium 139 136 - 145 mmol/L    Potassium 4.2 3.5 - 5.2 mmol/L    Chloride 104 98 -  107 mmol/L    CO2 24.0 22.0 - 29.0 mmol/L    Calcium 9.1 8.6 - 10.5 mg/dL    eGFR Non African Amer 75 >60 mL/min/1.73    BUN/Creatinine Ratio 7.1 7.0 - 25.0    Anion Gap 11.0 5.0 - 15.0 mmol/L   Results for orders placed or performed during the hospital encounter of 05/28/20   Stress Test With Myocardial Perfusion Two Day   Result Value Ref Range     CV STRESS PROTOCOL 1 Pharmacologic     Stage 1 1     Duration Min Stage 1 0     Duration Sec Stage 1 10     Stress Dose Regadenoson Stage 1 0.4     Stress Comments Stage 1 10 sec bolus injection     Target HR (85%) 155 bpm    Max. Pred. HR (100%) 182 bpm    HR Stage 1 85     BP Stage 1 131/83     Baseline HR 62 bpm    Baseline /84 mmHg    Peak HR 94 bpm    Percent Max Pred HR 51.65 %    Percent Target HR 61 %    Peak /92 mmHg    Recovery HR 75 bpm    Recovery /92 mmHg    Estimated workload 1.0 METS   Gold Top - SST   Result Value Ref Range    Extra Tube Hold for add-ons.    Gold Top - SST   Result Value Ref Range    Extra Tube Hold for add-ons.    Green Top (Gel)   Result Value Ref Range    Extra Tube Hold for add-ons.    COVID-19, [facility] IN-HOUSE, NP SWAB IN TRANSPORT MEDIA 8-10 HR TAT - Swab, Nasopharynx    Specimen: Nasopharynx; Swab   Result Value Ref Range    COVID19 Not Detected Not Detected - Ref. Range   CBC Auto Differential    Specimen: Blood   Result Value Ref Range    WBC 5.65 3.40 - 10.80 10*3/mm3    RBC 4.85 3.77 - 5.28 10*6/mm3    Hemoglobin 15.4 12.0 - 15.9 g/dL    Hematocrit 46.1 34.0 - 46.6 %    MCV 95.1 79.0 - 97.0 fL    MCH 31.8 26.6 - 33.0 pg    MCHC 33.4 31.5 - 35.7 g/dL    RDW 13.1 12.3 - 15.4 %    RDW-SD 44.9 37.0 - 54.0 fl    MPV 8.6 6.0 - 12.0 fL    Platelets 255 140 - 450 10*3/mm3    Neutrophil % 50.3 42.7 - 76.0 %    Lymphocyte % 39.8 19.6 - 45.3 %    Monocyte % 6.0 5.0 - 12.0 %    Eosinophil % 2.7 0.3 - 6.2 %    Basophil % 0.7 0.0 - 1.5 %    Immature Grans % 0.5 0.0 - 0.5 %    Neutrophils, Absolute 2.84 1.70 - 7.00  10*3/mm3    Lymphocytes, Absolute 2.25 0.70 - 3.10 10*3/mm3    Monocytes, Absolute 0.34 0.10 - 0.90 10*3/mm3    Eosinophils, Absolute 0.15 0.00 - 0.40 10*3/mm3    Basophils, Absolute 0.04 0.00 - 0.20 10*3/mm3    Immature Grans, Absolute 0.03 0.00 - 0.05 10*3/mm3    nRBC 0.0 0.0 - 0.2 /100 WBC   Lavender Top   Result Value Ref Range    Extra Tube hold for add-on    Lavender Top   Result Value Ref Range    Extra Tube hold for add-on    Light Blue Top   Result Value Ref Range    Extra Tube hold for add-on    Light Blue Top   Result Value Ref Range    Extra Tube hold for add-on    Troponin    Specimen: Blood   Result Value Ref Range    Troponin T <0.010 0.000 - 0.030 ng/mL   Troponin    Specimen: Blood   Result Value Ref Range    Troponin T <0.010 0.000 - 0.030 ng/mL   Troponin    Specimen: Blood   Result Value Ref Range    Troponin T <0.010 0.000 - 0.030 ng/mL   Troponin    Specimen: Blood   Result Value Ref Range    Troponin T <0.010 0.000 - 0.030 ng/mL   D-dimer, Quantitative    Specimen: Blood   Result Value Ref Range    D-Dimer, Quantitative 722 (H) 0 - 470 ng/mL (FEU)   CBC (No Diff)    Specimen: Blood   Result Value Ref Range    WBC 4.35 3.40 - 10.80 10*3/mm3    RBC 4.43 3.77 - 5.28 10*6/mm3    Hemoglobin 14.1 12.0 - 15.9 g/dL    Hematocrit 42.4 34.0 - 46.6 %    MCV 95.7 79.0 - 97.0 fL    MCH 31.8 26.6 - 33.0 pg    MCHC 33.3 31.5 - 35.7 g/dL    RDW 13.0 12.3 - 15.4 %    RDW-SD 46.0 37.0 - 54.0 fl    MPV 9.2 6.0 - 12.0 fL    Platelets 225 140 - 450 10*3/mm3     *Note: Due to a large number of results and/or encounters for the requested time period, some results have not been displayed. A complete set of results can be found in Results Review.

## 2020-10-12 ENCOUNTER — APPOINTMENT (OUTPATIENT)
Dept: CT IMAGING | Facility: HOSPITAL | Age: 40
End: 2020-10-12

## 2020-10-19 ENCOUNTER — TELEPHONE (OUTPATIENT)
Dept: GENERAL RADIOLOGY | Facility: HOSPITAL | Age: 40
End: 2020-10-19

## 2021-02-10 RX ORDER — DICYCLOMINE HCL 20 MG
TABLET ORAL
Qty: 120 TABLET | Refills: 1 | OUTPATIENT
Start: 2021-02-10

## 2021-05-17 RX ORDER — DICYCLOMINE HCL 20 MG
20 TABLET ORAL 4 TIMES DAILY
Qty: 120 TABLET | Refills: 1 | OUTPATIENT
Start: 2021-05-17

## 2021-05-17 NOTE — TELEPHONE ENCOUNTER
Contacted patient to make her aware of refill refusal. Patient was advised to make an appointment or have primary care continue writing the prescription. Patient stated her primary care has already refused to write it so patient made an appointment for 5-

## 2021-05-20 ENCOUNTER — OFFICE VISIT (OUTPATIENT)
Dept: GASTROENTEROLOGY | Facility: CLINIC | Age: 41
End: 2021-05-20

## 2021-05-20 VITALS
HEIGHT: 60 IN | SYSTOLIC BLOOD PRESSURE: 139 MMHG | BODY MASS INDEX: 39.27 KG/M2 | HEART RATE: 68 BPM | DIASTOLIC BLOOD PRESSURE: 86 MMHG | WEIGHT: 200 LBS

## 2021-05-20 DIAGNOSIS — K21.00 GASTROESOPHAGEAL REFLUX DISEASE WITH ESOPHAGITIS WITHOUT HEMORRHAGE: ICD-10-CM

## 2021-05-20 DIAGNOSIS — K58.2 IRRITABLE BOWEL SYNDROME WITH BOTH CONSTIPATION AND DIARRHEA: Primary | ICD-10-CM

## 2021-05-20 PROBLEM — G56.03 BILATERAL CARPAL TUNNEL SYNDROME: Status: ACTIVE | Noted: 2017-09-14

## 2021-05-20 PROBLEM — J45.909 ASTHMA: Status: ACTIVE | Noted: 2019-02-15

## 2021-05-20 PROBLEM — G40.909 SEIZURE DISORDER: Status: ACTIVE | Noted: 2017-09-14

## 2021-05-20 PROBLEM — F41.9 ANXIETY: Status: ACTIVE | Noted: 2017-05-17

## 2021-05-20 PROCEDURE — 99213 OFFICE O/P EST LOW 20 MIN: CPT | Performed by: NURSE PRACTITIONER

## 2021-05-20 RX ORDER — ATORVASTATIN CALCIUM 40 MG/1
TABLET, FILM COATED ORAL
COMMUNITY
Start: 2021-05-20

## 2021-05-20 RX ORDER — ONDANSETRON 4 MG/1
8 TABLET, ORALLY DISINTEGRATING ORAL EVERY 8 HOURS PRN
Qty: 30 TABLET | Refills: 3 | Status: SHIPPED | OUTPATIENT
Start: 2021-05-20 | End: 2021-08-23

## 2021-05-20 RX ORDER — CLONIDINE HYDROCHLORIDE 0.1 MG/1
TABLET ORAL
COMMUNITY
Start: 2021-05-20

## 2021-05-20 RX ORDER — OMEPRAZOLE 40 MG/1
40 CAPSULE, DELAYED RELEASE ORAL DAILY
Qty: 90 CAPSULE | Refills: 1 | Status: SHIPPED | OUTPATIENT
Start: 2021-05-20 | End: 2022-04-28 | Stop reason: SDUPTHER

## 2021-05-20 RX ORDER — DICYCLOMINE HCL 20 MG
20 TABLET ORAL 4 TIMES DAILY
Qty: 360 TABLET | Refills: 1 | Status: SHIPPED | OUTPATIENT
Start: 2021-05-20 | End: 2022-04-28 | Stop reason: SDUPTHER

## 2021-05-20 NOTE — PATIENT INSTRUCTIONS
"BMI for Adults  What is BMI?  Body mass index (BMI) is a number that is calculated from a person's weight and height. BMI can help estimate how much of a person's weight is composed of fat. BMI does not measure body fat directly. Rather, it is an alternative to procedures that directly measure body fat, which can be difficult and expensive.  BMI can help identify people who may be at higher risk for certain medical problems.  What are BMI measurements used for?  BMI is used as a screening tool to identify possible weight problems. It helps determine whether a person is obese, overweight, a healthy weight, or underweight.  BMI is useful for:  · Identifying a weight problem that may be related to a medical condition or may increase the risk for medical problems.  · Promoting changes, such as changes in diet and exercise, to help reach a healthy weight. BMI screening can be repeated to see if these changes are working.  How is BMI calculated?  BMI involves measuring your weight in relation to your height. Both height and weight are measured, and the BMI is calculated from those numbers. This can be done either in English ([place] or metric measurements. Note that charts and online BMI calculators are available to help you find your BMI quickly and easily without having to do these calculations yourself.  To calculate your BMI in English ([place] measurements:    1. Measure your weight in pounds (lb).  2. Multiply the number of pounds by 703.  ? For example, for a person who weighs 180 lb, multiply that number by 703, which equals [phone],540.  3. Measure your height in inches. Then multiply that number by itself to get a measurement called \"inches squared.\"  ? For example, for a person who is 70 inches tall, the \"inches squared\" measurement is 70 inches x 70 inches, which equals 4,900 inches squared.  4. Divide the total from step 2 (number of lb x 703) by the total from step 3 (inches squared): 126,540 ÷ 4,900 = 25.8. This is " "your BMI.  To calculate your BMI in metric measurements:  1. Measure your weight in kilograms (kg).  2. Measure your height in meters (m). Then multiply that number by itself to get a measurement called \"meters squared.\"  ? For example, for a person who is 1.75 m tall, the \"meters squared\" measurement is 1.75 m x 1.75 m, which is equal to 3.1 meters squared.  3. Divide the number of kilograms (your weight) by the meters squared number. In this example: 70 ÷ 3.1 = 22.6. This is your BMI.  What do the results mean?  BMI charts are used to identify whether you are underweight, normal weight, overweight, or obese. The following guidelines will be used:  · Underweight: BMI less than 18.5.  · Normal weight: BMI between 18.5 and 24.9.  · Overweight: BMI between 25 and 29.9.  · Obese: BMI of 30 or above.  Keep these notes in mind:  · Weight includes both fat and muscle, so someone with a muscular build, such as an athlete, may have a BMI that is higher than 24.9. In cases like these, BMI is not an accurate measure of body fat.  · To determine if excess body fat is the cause of a BMI of 25 or higher, further assessments may need to be done by a health care provider.  · BMI is usually interpreted in the same way for men and women.  Where to find more information  For more information about BMI, including tools to quickly calculate your BMI, go to these websites:  · Centers for Disease Control and Prevention: www.cdc.gov  · American Heart Association: www.heart.org  · National Heart, Lung, and Blood [employer]: www.nhlbi.nih.gov  Summary  · Body mass index (BMI) is a number that is calculated from a person's weight and height.  · BMI may help estimate how much of a person's weight is composed of fat. BMI can help identify those who may be at higher risk for certain medical problems.  · BMI can be measured using English measurements or metric measurements.  · BMI charts are used to identify whether you are underweight, normal " weight, overweight, or obese.  This information is not intended to replace advice given to you by your health care provider. Make sure you discuss any questions you have with your health care provider.  Document Revised: 09/09/2020 Document Reviewed: 07/17/2020  Elsevier Patient Education © 2021 [employer]

## 2021-05-20 NOTE — PROGRESS NOTES
Chief Complaint   Patient presents with   • Abdominal Pain       Subjective    Tete Chen is a 40 y.o. female. she is here today for follow-up.    History of Present Illness  40-year-old female presents for recheck regarding abdominal pain and reflux.  Since last visit she has been well maintained on omeprazole, Zofran, Bentyl and bowel movements very at times depending on intake but overall typically 2-3 times per day she had previous CT which noted possible colitis but colonoscopy in 2019 with biopsy was normal we plan repeat CT however she was not able to complete due to multiple quarantined so it has not been rescheduled.  Symptoms are well controlled now with current medication regimen.       The following portions of the patient's history were reviewed and updated as appropriate:   Past Medical History:   Diagnosis Date   • Acute maxillary sinusitis    • Adjustment disorder with anxiety    • Adjustment disorder with anxious mood    • Biliary calculus     post cholecystectomy      • Candidiasis    • Central abdominal pain    • Chronic pain    • Community acquired pneumonia    • Dental abscess    • Dental caries     o/e   • Depressive disorder      with some anxiety      • Depressive disorder     not elsewhere classified      • Essential hypertension    • Essential hypertension    • Frequent hospital admissions    • Gastroesophageal reflux disease    • Gastroesophageal reflux disease    • Generalized anxiety disorder    • Hyperlipidemia    • Hyperreflexia    • Influenza     needs immunization   • Irritable bowel syndrome    • Major depressive disorder    • Malaise and fatigue    • Multiple joint pain    • Nausea and vomiting    • Need for prophylactic vaccination against Streptococcus pneumoniae (pneumococcus)    • Obesity    • Rhonchi     low-pitched   • Seizures (CMS/HCC)    • Tobacco dependence syndrome    • Upper respiratory infection      Past Surgical History:   Procedure Laterality Date   • CARDIAC  CATHETERIZATION N/A 6/8/2020    Procedure: Left Heart Cath- radial  6/8/20 @ 9;  Surgeon: Garo Lubin MD;  Location: [facility] CATH INVASIVE LOCATION;  Service: Cardiology;  Laterality: N/A;   • CHOLECYSTECTOMY      laparoscopic (With cholangiogram. Symptomatic gallstones.)   06/03/2015    • COLONOSCOPY N/A 2/20/2019    Procedure: COLONOSCOPY;  Surgeon: Virgil Aiken MD;  Location: [facility] ENDOSCOPY;  Service: Gastroenterology   • ENDOSCOPY      w/ biopsy [id number] (Gastritis found inthe body of the stomach. EGD with biopsy.)   07/21/2015    • ENDOSCOPY      w/ tube [id number] (Normal esophagus. Gastritis in stomach. Biopsy taken. Normal duodenum. Biopsy taken.)   10/19/2011    • ENDOSCOPY N/A 11/1/2019    Procedure: ESOPHAGOGASTRODUODENOSCOPY possible dilation;  Surgeon: Virgil Aiken MD;  Location: [facility] ENDOSCOPY;  Service: Gastroenterology   • HYSTEROSCOPY  07/09/2008   • TOTAL ABDOMINAL HYSTERECTOMY WITH SALPINGO OOPHORECTOMY  09/03/2008   • TUBAL ABDOMINAL LIGATION  08/21/2006   • VAGINAL DELIVERY      , P2, had pre-eclampsia with both     Family History   Problem Relation Age of Onset   • Other Other         depressive disorder   • Diabetes Other    • Hypertension Other    • Other Other         Mother is diabetic, hypertensive, anxiety, depression. Father is 61, diabetic, hypertensive, had a CABG, first MI was at 50. He has had CVA's, TIA's. 10-year-old sister who is Type I diabetic     OB History    No obstetric history on file.       Prior to Admission medications    Medication Sig Start Date End Date Taking? Authorizing Provider   albuterol (PROVENTIL) (2.5 MG/3ML) 0.083% nebulizer solution Take 2.5 mg by nebulization 4 (Four) Times a Day. 2/15/19  Yes Emergency, Nurse Bren, RN   ALPRAZolam (XANAX) 1 MG tablet Take 1 mg by mouth Every 6 (Six) Hours As Needed.   Yes ProviderHeather MD   ASPIRIN 81 PO Take 81 mg by mouth Daily.   Yes ProviderHeather MD   buprenorphine-naloxone (SUBOXONE) 8-2 MG  per SL tablet Place  under the tongue Daily. 1 3/4 tablets a day   Yes ProviderHeather MD   citalopram (CeleXA) 20 MG tablet Take 40 mg by mouth Daily. 5/14/19  Yes Emergency, Nurse Epic, RN   dicyclomine (BENTYL) 20 MG tablet Take 1 tablet by mouth 4 (Four) Times a Day. 9/30/20  Yes Marilou Mario APRN   fluticasone (FLONASE) 50 MCG/ACT nasal spray 1 spray into the nostril(s) as directed by provider Daily. 5/15/21  Yes Ivett Oconnor APRN   gabapentin (NEURONTIN) 800 MG tablet Take 1 tablet by mouth 3 (Three) Times a Day.  Patient taking differently: Take 600 mg by mouth 3 (Three) Times a Day. 10/14/16  Yes Erin Devine MD   isosorbide dinitrate (ISORDIL) 30 MG tablet Take 30 mg by mouth Daily.   Yes ProviderHeather MD   levETIRAcetam (KEPPRA) 500 MG tablet Take 1 tablet by mouth 2 (Two) Times a Day. 10/14/16  Yes Erin Devine MD   lisinopril (PRINIVIL,ZESTRIL) 10 MG tablet Take 1 tablet by mouth Daily. 5/29/20  Yes Willie Rollins MD   MOVANTIK 25 MG tablet Take 25 mg by mouth Every Morning. 4/29/19  Yes Emergency, Nurse Epic, RN   omeprazole (priLOSEC) 40 MG capsule Take 40 mg by mouth Daily. 1/21/20  Yes Emergency, Nurse JOHANNA Correia   ondansetron ODT (ZOFRAN-ODT) 4 MG disintegrating tablet dissolve ONE tablet by MOUTH every 4-6 hours FOR THREE DAYS 2/11/19  Yes ProviderHeather MD   probiotic (CULTURELLE) capsule capsule Take 1 capsule by mouth 2 (Two) Times a Day. 2/21/19  Yes Jonas Quezada DO   Ventolin  (90 Base) MCG/ACT inhaler Inhale 2 puffs Every 4 (Four) Hours As Needed for Wheezing. 5/15/21  Yes Ivett Oconnor APRN   atorvastatin (LIPITOR) 40 MG tablet  5/20/21   ProviderHeather MD   cloNIDine (CATAPRES) 0.1 MG tablet  5/20/21   Provider, Historical, MD     Allergies   Allergen Reactions   • Ceclor [Cefaclor] Anaphylaxis   • Vancomycin Angioedema   • Reglan [Metoclopramide] Mental Status Change   • Zithromax [Azithromycin] Nausea And Vomiting     z-iam   •  "Cephalexin Rash   • Valtrex [Valacyclovir Hcl] Itching     Social History     Socioeconomic History   • Marital status:      Spouse name: Not on file   • Number of children: Not on file   • Years of education: Not on file   • Highest education level: Not on file   Tobacco Use   • Smoking status: Current Every Day Smoker     Packs/day: 0.25     Types: Electronic Cigarette, Cigarettes   • Smokeless tobacco: Never Used   Substance and Sexual Activity   • Alcohol use: No   • Drug use: No       Review of Systems  Review of Systems   Constitutional: Positive for fatigue. Negative for activity change, appetite change, chills, diaphoresis, fever and unexpected weight change.   HENT: Negative for sore throat and trouble swallowing.    Respiratory: Negative for shortness of breath.    Gastrointestinal: Positive for abdominal pain, constipation, diarrhea and nausea. Negative for abdominal distention, anal bleeding, blood in stool, rectal pain and vomiting.   Musculoskeletal: Negative for arthralgias.   Skin: Negative for pallor.   Neurological: Negative for light-headedness.        /86 (BP Location: Left arm)   Pulse 68   Ht 152.4 cm (60\")   Wt 90.7 kg (200 lb)   LMP  (LMP Unknown)   BMI 39.06 kg/m²     Objective    Physical Exam  Constitutional:       Appearance: Normal appearance. She is well-developed.   HENT:      Head: Normocephalic and atraumatic.   Neck:      Thyroid: No thyromegaly.   Pulmonary:      Effort: Pulmonary effort is normal.   Abdominal:      General: Bowel sounds are normal. There is no distension.      Palpations: Abdomen is soft. Abdomen is not rigid.      Tenderness: There is no abdominal tenderness.      Hernia: No hernia is present.   Musculoskeletal:      Cervical back: Normal range of motion and neck supple.   Skin:     General: Skin is warm and dry.      Coloration: Skin is not pale.      Findings: No rash.   Neurological:      Mental Status: She is alert and oriented to person, " place, and time.   Psychiatric:         Speech: Speech normal.         Behavior: Behavior is cooperative.       Admission on 05/15/2021, Discharged on 05/15/2021   Component Date Value Ref Range Status   • SARS Antigen 05/15/2021 Not Detected   Final   • Internal Control 05/15/2021 Passed  Passed Final   • Lot Number 05/15/2021 [id number]   Final   • Expiration Date 05/15/2021 11/08/2021   Final     Assessment/Plan      1. Irritable bowel syndrome with both constipation and diarrhea    2. Gastroesophageal reflux disease with esophagitis without hemorrhage    .   Continue with current medication regimen follow-up in 6 months for recheck continue with dietary modifications and avoidance of gastric irritants.  Return office sooner if needed    Orders placed during this encounter include:  No orders of the defined types were placed in this encounter.      * Surgery not found *    Review and/or summary of lab tests, radiology, procedures, medications. Review and summary of old records and obtaining of history. The risks and benefits of my recommendations, as well as other treatment options were discussed with the patient today. Questions were answered.    New Medications Ordered This Visit   Medications   • dicyclomine (BENTYL) 20 MG tablet     Sig: Take 1 tablet by mouth 4 (Four) Times a Day.     Dispense:  360 tablet     Refill:  1   • omeprazole (priLOSEC) 40 MG capsule     Sig: Take 1 capsule by mouth Daily.     Dispense:  90 capsule     Refill:  1   • ondansetron ODT (ZOFRAN-ODT) 4 MG disintegrating tablet     Sig: Place 2 tablets on the tongue Every 8 (Eight) Hours As Needed for Nausea or Vomiting.     Dispense:  30 tablet     Refill:  3       Follow-up: Return in about 6 months (around 11/20/2021) for Recheck.          This document has been electronically signed by DOLORES Messina on May 20, 2021 14:04 CDT           I spent 18 minutes caring for Tete on this date of service. This time includes time spent by me  in the following activities:preparing for the visit, reviewing tests, obtaining and/or reviewing a separately obtained history, performing a medically appropriate examination and/or evaluation , counseling and educating the patient/family/caregiver, ordering medications, tests, or procedures, referring and communicating with other health care professionals , documenting information in the medical record and care coordination    Results for orders placed or performed during the hospital encounter of 05/15/21   POCT SARS-CoV-2 Antigen RODGER   ([facility])    Specimen: Swab   Result Value Ref Range    SARS Antigen Not Detected     Internal Control Passed Passed    Lot Number [id number]     Expiration Date 11/08/2021    Results for orders placed or performed in visit on 09/30/20   Sedimentation Rate    Specimen: Blood   Result Value Ref Range    Sed Rate 5 0 - 20 mm/hr   Results for orders placed or performed in visit on 09/30/20   CBC (No Diff)    Specimen: Blood   Result Value Ref Range    WBC 5.16 3.40 - 10.80 10*3/mm3    RBC 4.25 3.77 - 5.28 10*6/mm3    Hemoglobin 13.4 12.0 - 15.9 g/dL    Hematocrit 38.6 34.0 - 46.6 %    MCV 90.8 79.0 - 97.0 fL    MCH 31.5 26.6 - 33.0 pg    MCHC 34.7 31.5 - 35.7 g/dL    RDW 12.5 12.3 - 15.4 %    RDW-SD 40.8 37.0 - 54.0 fl    MPV 9.5 6.0 - 12.0 fL    Platelets 259 140 - 450 10*3/mm3   Comprehensive Metabolic Panel    Specimen: Blood   Result Value Ref Range    Glucose 99 65 - 99 mg/dL    BUN 4 (L) 6 - 20 mg/dL    Creatinine 0.77 0.57 - 1.00 mg/dL    Sodium 141 136 - 145 mmol/L    Potassium 3.8 3.5 - 5.2 mmol/L    Chloride 104 98 - 107 mmol/L    CO2 26.4 22.0 - 29.0 mmol/L    Calcium 9.4 8.6 - 10.5 mg/dL    Total Protein 6.9 6.0 - 8.5 g/dL    Albumin 4.00 3.50 - 5.20 g/dL    ALT (SGPT) 51 (H) 1 - 33 U/L    AST (SGOT) 36 (H) 1 - 32 U/L    Alkaline Phosphatase 95 39 - 117 U/L    Total Bilirubin 0.3 0.0 - 1.2 mg/dL    eGFR Non African Amer 83 >60 mL/min/1.73    Globulin 2.9  gm/dL    A/G Ratio 1.4 g/dL    BUN/Creatinine Ratio 5.2 (L) 7.0 - 25.0    Anion Gap 10.6 5.0 - 15.0 mmol/L   Results for orders placed or performed during the hospital encounter of 06/08/20   COVID [facility] Priority - Swab, Nasopharynx    Specimen: Nasopharynx; Swab   Result Value Ref Range    COVID [facility] PRIORITY Comment    COVID-19,[facility] ROUTINE, NP/OP SWAB IN TRANSPORT MEDIA OR ESWAB 72 HR TAT - Swab, Nasopharynx    Specimen: Nasopharynx; Swab   Result Value Ref Range    SARS-CoV-2, [facility] Not Detected Not Detected   CBC Auto Differential    Specimen: Blood   Result Value Ref Range    WBC 5.55 3.40 - 10.80 10*3/mm3    RBC 4.63 3.77 - 5.28 10*6/mm3    Hemoglobin 15.0 12.0 - 15.9 g/dL    Hematocrit 43.8 34.0 - 46.6 %    MCV 94.6 79.0 - 97.0 fL    MCH 32.4 26.6 - 33.0 pg    MCHC 34.2 31.5 - 35.7 g/dL    RDW 12.6 12.3 - 15.4 %    RDW-SD 43.5 37.0 - 54.0 fl    MPV 8.9 6.0 - 12.0 fL    Platelets 248 140 - 450 10*3/mm3    Neutrophil % 51.7 42.7 - 76.0 %    Lymphocyte % 37.7 19.6 - 45.3 %    Monocyte % 5.2 5.0 - 12.0 %    Eosinophil % 4.1 0.3 - 6.2 %    Basophil % 1.1 0.0 - 1.5 %    Immature Grans % 0.2 0.0 - 0.5 %    Neutrophils, Absolute 2.87 1.70 - 7.00 10*3/mm3    Lymphocytes, Absolute 2.09 0.70 - 3.10 10*3/mm3    Monocytes, Absolute 0.29 0.10 - 0.90 10*3/mm3    Eosinophils, Absolute 0.23 0.00 - 0.40 10*3/mm3    Basophils, Absolute 0.06 0.00 - 0.20 10*3/mm3    Immature Grans, Absolute 0.01 0.00 - 0.05 10*3/mm3    nRBC 0.0 0.0 - 0.2 /100 WBC   Protime-INR    Specimen: Blood   Result Value Ref Range    Protime 12.7 11.1 - 15.3 Seconds    INR 0.97 0.80 - 1.20   Basic Metabolic Panel    Specimen: Blood   Result Value Ref Range    Glucose 110 (H) 65 - 99 mg/dL    BUN 6 6 - 20 mg/dL    Creatinine 0.85 0.57 - 1.00 mg/dL    Sodium 139 136 - 145 mmol/L    Potassium 4.2 3.5 - 5.2 mmol/L    Chloride 104 98 - 107 mmol/L    CO2 24.0 22.0 - 29.0 mmol/L    Calcium 9.1 8.6 - 10.5 mg/dL    eGFR Non African Amer 75 >60 mL/min/1.73     BUN/Creatinine Ratio 7.1 7.0 - 25.0    Anion Gap 11.0 5.0 - 15.0 mmol/L   Results for orders placed or performed during the hospital encounter of 05/28/20   Stress Test With Myocardial Perfusion Two Day   Result Value Ref Range     CV STRESS PROTOCOL 1 Pharmacologic     Stage 1 1     Duration Min Stage 1 0     Duration Sec Stage 1 10     Stress Dose Regadenoson Stage 1 0.4     Stress Comments Stage 1 10 sec bolus injection     Target HR (85%) 155 bpm    Max. Pred. HR (100%) 182 bpm    HR Stage 1 85     BP Stage 1 131/83     Baseline HR 62 bpm    Baseline /84 mmHg    Peak HR 94 bpm    Percent Max Pred HR 51.65 %    Percent Target HR 61 %    Peak /92 mmHg    Recovery HR 75 bpm    Recovery /92 mmHg    Estimated workload 1.0 METS   Gold Top - SST   Result Value Ref Range    Extra Tube Hold for add-ons.    Gold Top - SST   Result Value Ref Range    Extra Tube Hold for add-ons.    Green Top (Gel)   Result Value Ref Range    Extra Tube Hold for add-ons.    COVID-19,  [facility]SE, NP SWAB IN TRANSPORT MEDIA 8-10 HR TAT - Swab, Nasopharynx    Specimen: Nasopharynx; Swab   Result Value Ref Range    COVID19 Not Detected Not Detected - Ref. Range   CBC Auto Differential    Specimen: Blood   Result Value Ref Range    WBC 5.65 3.40 - 10.80 10*3/mm3    RBC 4.85 3.77 - 5.28 10*6/mm3    Hemoglobin 15.4 12.0 - 15.9 g/dL    Hematocrit 46.1 34.0 - 46.6 %    MCV 95.1 79.0 - 97.0 fL    MCH 31.8 26.6 - 33.0 pg    MCHC 33.4 31.5 - 35.7 g/dL    RDW 13.1 12.3 - 15.4 %    RDW-SD 44.9 37.0 - 54.0 fl    MPV 8.6 6.0 - 12.0 fL    Platelets 255 140 - 450 10*3/mm3    Neutrophil % 50.3 42.7 - 76.0 %    Lymphocyte % 39.8 19.6 - 45.3 %    Monocyte % 6.0 5.0 - 12.0 %    Eosinophil % 2.7 0.3 - 6.2 %    Basophil % 0.7 0.0 - 1.5 %    Immature Grans % 0.5 0.0 - 0.5 %    Neutrophils, Absolute 2.84 1.70 - 7.00 10*3/mm3    Lymphocytes, Absolute 2.25 0.70 - 3.10 10*3/mm3    Monocytes, Absolute 0.34 0.10 - 0.90 10*3/mm3    Eosinophils,  Absolute 0.15 0.00 - 0.40 10*3/mm3    Basophils, Absolute 0.04 0.00 - 0.20 10*3/mm3    Immature Grans, Absolute 0.03 0.00 - 0.05 10*3/mm3    nRBC 0.0 0.0 - 0.2 /100 WBC     *Note: Due to a large number of results and/or encounters for the requested time period, some results have not been displayed. A complete set of results can be found in Results Review.

## 2021-08-23 RX ORDER — ONDANSETRON 4 MG/1
TABLET, ORALLY DISINTEGRATING ORAL
Qty: 30 TABLET | Refills: 3 | Status: SHIPPED | OUTPATIENT
Start: 2021-08-23 | End: 2021-11-22

## 2021-11-22 RX ORDER — ONDANSETRON 4 MG/1
TABLET, ORALLY DISINTEGRATING ORAL
Qty: 30 TABLET | Refills: 3 | Status: SHIPPED | OUTPATIENT
Start: 2021-11-22 | End: 2022-01-26

## 2022-01-26 RX ORDER — ONDANSETRON 4 MG/1
TABLET, ORALLY DISINTEGRATING ORAL
Qty: 30 TABLET | Refills: 3 | Status: SHIPPED | OUTPATIENT
Start: 2022-01-26 | End: 2022-04-28

## 2022-04-05 RX ORDER — ONDANSETRON 4 MG/1
TABLET, ORALLY DISINTEGRATING ORAL
Qty: 30 TABLET | Refills: 3 | OUTPATIENT
Start: 2022-04-05

## 2022-04-07 RX ORDER — ONDANSETRON 4 MG/1
TABLET, ORALLY DISINTEGRATING ORAL
Qty: 30 TABLET | Refills: 3 | OUTPATIENT
Start: 2022-04-07

## 2022-04-28 ENCOUNTER — OFFICE VISIT (OUTPATIENT)
Dept: GASTROENTEROLOGY | Facility: CLINIC | Age: 42
End: 2022-04-28

## 2022-04-28 ENCOUNTER — HOSPITAL ENCOUNTER (OUTPATIENT)
Facility: HOSPITAL | Age: 42
Setting detail: HOSPITAL OUTPATIENT SURGERY
End: 2022-04-28
Attending: INTERNAL MEDICINE | Admitting: INTERNAL MEDICINE

## 2022-04-28 VITALS
DIASTOLIC BLOOD PRESSURE: 100 MMHG | WEIGHT: 194.6 LBS | SYSTOLIC BLOOD PRESSURE: 148 MMHG | BODY MASS INDEX: 36.74 KG/M2 | HEIGHT: 61 IN | HEART RATE: 84 BPM

## 2022-04-28 DIAGNOSIS — R10.84 GENERALIZED ABDOMINAL PAIN: ICD-10-CM

## 2022-04-28 DIAGNOSIS — K92.1 BLOOD IN STOOL: ICD-10-CM

## 2022-04-28 DIAGNOSIS — K21.00 GASTROESOPHAGEAL REFLUX DISEASE WITH ESOPHAGITIS WITHOUT HEMORRHAGE: Primary | ICD-10-CM

## 2022-04-28 DIAGNOSIS — R19.7 DIARRHEA, UNSPECIFIED TYPE: ICD-10-CM

## 2022-04-28 PROCEDURE — 99213 OFFICE O/P EST LOW 20 MIN: CPT | Performed by: NURSE PRACTITIONER

## 2022-04-28 RX ORDER — ONDANSETRON 4 MG/1
4 TABLET, FILM COATED ORAL EVERY 8 HOURS
Qty: 20 TABLET | Refills: 1 | Status: SHIPPED | OUTPATIENT
Start: 2022-04-28 | End: 2022-05-17 | Stop reason: CLARIF

## 2022-04-28 RX ORDER — ONDANSETRON 4 MG/1
4 TABLET, FILM COATED ORAL EVERY 8 HOURS
COMMUNITY
Start: 2022-04-26 | End: 2022-04-28 | Stop reason: SDUPTHER

## 2022-04-28 RX ORDER — SODIUM, POTASSIUM,MAG SULFATES 17.5-3.13G
1 SOLUTION, RECONSTITUTED, ORAL ORAL EVERY 12 HOURS
Qty: 354 ML | Refills: 0 | Status: SHIPPED | OUTPATIENT
Start: 2022-04-28 | End: 2023-01-07

## 2022-04-28 RX ORDER — GABAPENTIN 600 MG/1
600 TABLET ORAL 3 TIMES DAILY
COMMUNITY
Start: 2022-04-07

## 2022-04-28 RX ORDER — DEXTROSE AND SODIUM CHLORIDE 5; .45 G/100ML; G/100ML
30 INJECTION, SOLUTION INTRAVENOUS CONTINUOUS PRN
Status: CANCELLED | OUTPATIENT
Start: 2022-07-11

## 2022-04-28 RX ORDER — OMEPRAZOLE 40 MG/1
40 CAPSULE, DELAYED RELEASE ORAL DAILY
Qty: 90 CAPSULE | Refills: 1 | Status: SHIPPED | OUTPATIENT
Start: 2022-04-28

## 2022-04-28 RX ORDER — DICYCLOMINE HCL 20 MG
20 TABLET ORAL 4 TIMES DAILY
Qty: 360 TABLET | Refills: 1 | Status: SHIPPED | OUTPATIENT
Start: 2022-04-28

## 2022-04-28 NOTE — PROGRESS NOTES
Chief Complaint   Patient presents with   • Abdominal Pain       Subjective    Tete Chen is a 41 y.o. female. she is here today for follow-up.    41-year-old female presents to discuss abdominal pain frequent episodes of diarrhea she was last seen in 2021 underwent EGD and colonoscopy in 2019 she had CT at 1 point that noted colitis.  Follow-up colonoscopy had normal biopsy in symptoms seem to be controlled at that point.  She has been taking Bentyl and Zofran as needed which does control symptoms reports bowel habit frequently alternate between constipation diarrhea depending on her intake.  She reports bright red blood in the stool intermittently    Abdominal Pain  This is a chronic problem. The current episode started more than 1 year ago. The onset quality is gradual. The problem occurs intermittently. The problem has been gradually worsening. The pain is located in the generalized abdominal region. The quality of the pain is cramping. The abdominal pain does not radiate. Associated symptoms include anorexia, belching, diarrhea, nausea, vomiting and weight loss. Pertinent negatives include no arthralgias, constipation, dysuria, fever, flatus, frequency, headaches, hematochezia, hematuria, melena or myalgias. The pain is aggravated by eating. The pain is relieved by nothing. The treatment provided mild relief. Prior workup: previous EGD/Colon 2019.     Plan; schedule patient for EGD and colonoscopy concerned about Crohn's disease due to family history of Crohn's in her father persistent diarrhea and abdominal pain.  Continue Bentyl as needed for cramping or diarrhea       The following portions of the patient's history were reviewed and updated as appropriate:   Past Medical History:   Diagnosis Date   • Acute maxillary sinusitis    • Adjustment disorder with anxiety    • Adjustment disorder with anxious mood    • Biliary calculus     post cholecystectomy      • Candidiasis    • Central abdominal pain     • Chronic pain    • Community acquired pneumonia    • Dental abscess    • Dental caries     o/e   • Depressive disorder      with some anxiety      • Depressive disorder     not elsewhere classified      • Essential hypertension    • Essential hypertension    • Frequent hospital admissions    • Gastroesophageal reflux disease    • Gastroesophageal reflux disease    • Generalized anxiety disorder    • Hyperlipidemia    • Hyperreflexia    • Influenza     needs immunization   • Irritable bowel syndrome    • Major depressive disorder    • Malaise and fatigue    • Multiple joint pain    • Nausea and vomiting    • Need for prophylactic vaccination against Streptococcus pneumoniae (pneumococcus)    • Obesity    • Rhonchi     low-pitched   • Seizures (HCC)    • Tobacco dependence syndrome    • Upper respiratory infection      Past Surgical History:   Procedure Laterality Date   • CARDIAC CATHETERIZATION N/A 6/8/2020    Procedure: Left Heart Cath- radial  6/8/20 @ 9;  Surgeon: Garo Lubin MD;  Location: [facility] CATH INVASIVE LOCATION;  Service: Cardiology;  Laterality: N/A;   • CHOLECYSTECTOMY      laparoscopic (With cholangiogram. Symptomatic gallstones.)   06/03/2015    • COLONOSCOPY N/A 2/20/2019    Procedure: COLONOSCOPY;  Surgeon: Virgil Aiken MD;  Location: [facility] ENDOSCOPY;  Service: Gastroenterology   • ENDOSCOPY      w/ biopsy [id number] (Gastritis found inthe body of the stomach. EGD with biopsy.)   07/21/2015    • ENDOSCOPY      w/ tube 51689 (Normal esophagus. Gastritis in stomach. Biopsy taken. Normal duodenum. Biopsy taken.)   10/19/2011    • ENDOSCOPY N/A 11/1/2019    Procedure: ESOPHAGOGASTRODUODENOSCOPY possible dilation;  Surgeon: Virgil Aiken MD;  Location: [facility] ENDOSCOPY;  Service: Gastroenterology   • HYSTEROSCOPY  07/09/2008   • TOTAL ABDOMINAL HYSTERECTOMY WITH SALPINGO OOPHORECTOMY  09/03/2008   • TUBAL ABDOMINAL LIGATION  08/21/2006   • VAGINAL DELIVERY      , P2, had pre-eclampsia with  both     Family History   Problem Relation Age of Onset   • Other Other         depressive disorder   • Diabetes Other    • Hypertension Other    • Other Other         Mother is diabetic, hypertensive, anxiety, depression. Father is 61, diabetic, hypertensive, had a CABG, first MI was at 50. He has had CVA's, TIA's. 10-year-old sister who is Type I diabetic     OB History    No obstetric history on file.       Prior to Admission medications    Medication Sig Start Date End Date Taking? Authorizing Provider   albuterol (PROVENTIL) (2.5 MG/3ML) 0.083% nebulizer solution Take 2.5 mg by nebulization 4 (Four) Times a Day. 2/15/19  Yes Emergency, Nurse Epic, RN   ALPRAZolam (XANAX) 1 MG tablet Take 1 mg by mouth Every 6 (Six) Hours As Needed.   Yes Heather Queen MD   ASPIRIN 81 PO Take 81 mg by mouth Daily.   Yes Heather Queen MD   atorvastatin (LIPITOR) 40 MG tablet  5/20/21  Yes Heather Queen MD   buprenorphine-naloxone (SUBOXONE) 8-2 MG per SL tablet Place  under the tongue Daily. 1 3/4 tablets a day   Yes Heather Queen MD   citalopram (CeleXA) 20 MG tablet Take 40 mg by mouth Daily. 5/14/19  Yes Emergency, Nurse Bren, RN   cloNIDine (CATAPRES) 0.1 MG tablet  5/20/21  Yes Heather Queen MD   dicyclomine (BENTYL) 20 MG tablet Take 1 tablet by mouth 4 (Four) Times a Day. 5/20/21  Yes Marilou Mario APRN   fluticasone (FLONASE) 50 MCG/ACT nasal spray 1 spray into the nostril(s) as directed by provider Daily. 5/15/21  Yes Ivett Oconnor APRN   gabapentin (NEURONTIN) 600 MG tablet Take 600 mg by mouth 3 (Three) Times a Day. 4/7/22  Yes Heather Queen MD   isosorbide dinitrate (ISORDIL) 30 MG tablet Take 30 mg by mouth Daily.   Yes Heather Queen MD   levETIRAcetam (KEPPRA) 500 MG tablet Take 1 tablet by mouth 2 (Two) Times a Day. 10/14/16  Yes Erin Devine MD   lisinopril (PRINIVIL,ZESTRIL) 10 MG tablet Take 1 tablet by mouth Daily. 5/29/20  Yes Willie Rollins MD    MOVANTIK 25 MG tablet Take 25 mg by mouth Every Morning. 4/29/19  Yes Emergency, Nurse Epic, RN   omeprazole (priLOSEC) 40 MG capsule Take 1 capsule by mouth Daily. 5/20/21  Yes Marilou Mario APRN   ondansetron (ZOFRAN) 4 MG tablet Take 4 mg by mouth Every 8 (Eight) Hours. 4/26/22  Yes Provider, MD Heather   probiotic (CULTURELLE) capsule capsule Take 1 capsule by mouth 2 (Two) Times a Day. 2/21/19  Yes Jonas Quezada DO   Ventolin  (90 Base) MCG/ACT inhaler Inhale 2 puffs Every 4 (Four) Hours As Needed for Wheezing. 5/15/21  Yes Ivett Oconnor APRN   gabapentin (NEURONTIN) 800 MG tablet Take 1 tablet by mouth 3 (Three) Times a Day.  Patient taking differently: Take 600 mg by mouth 3 (Three) Times a Day. 10/14/16 4/28/22  Erin Devine MD   ondansetron ODT (ZOFRAN-ODT) 4 MG disintegrating tablet dissolve 2 tablets by mouth every 8 hours AS NEEDED FOR NAUSEA AND VOMITING 1/26/22 4/28/22  Marilou Mario APRN     Allergies   Allergen Reactions   • Ceclor [Cefaclor] Anaphylaxis   • Valtrex [Valacyclovir] Anaphylaxis   • Vancomycin Angioedema   • Reglan [Metoclopramide] Mental Status Change   • Zithromax [Azithromycin] Nausea And Vomiting     z-iam   • Cephalexin Rash   • Valtrex [Valacyclovir Hcl] Itching     Social History     Socioeconomic History   • Marital status:    Tobacco Use   • Smoking status: Current Every Day Smoker     Packs/day: 0.25     Types: Electronic Cigarette, Cigarettes   • Smokeless tobacco: Never Used   Substance and Sexual Activity   • Alcohol use: No   • Drug use: No       Review of Systems  Review of Systems   Constitutional: Positive for appetite change, fatigue and weight loss. Negative for activity change, chills, diaphoresis, fever and unexpected weight change.   HENT: Negative for trouble swallowing.    Gastrointestinal: Positive for abdominal pain, anorexia, blood in stool (occ hemorrhoidal ), diarrhea, nausea and vomiting. Negative for abdominal distention,  "anal bleeding, constipation, flatus, hematochezia, melena and rectal pain.   Genitourinary: Negative for dysuria, frequency and hematuria.   Musculoskeletal: Negative for arthralgias and myalgias.   Neurological: Negative for headaches.        /100 (BP Location: Left arm)   Pulse 84   Ht 154.9 cm (61\")   Wt 88.3 kg (194 lb 9.6 oz)   LMP  (LMP Unknown)   BMI 36.77 kg/m²     Objective    Physical Exam  Constitutional:       General: She is not in acute distress.     Appearance: Normal appearance. She is normal weight. She is not ill-appearing.   HENT:      Head: Normocephalic and atraumatic.   Pulmonary:      Effort: Pulmonary effort is normal.   Abdominal:      General: Abdomen is flat. Bowel sounds are normal. There is no distension.      Palpations: Abdomen is soft. There is no mass.      Tenderness: There is generalized abdominal tenderness.   Neurological:      Mental Status: She is alert.       Admission on 05/15/2021, Discharged on 05/15/2021   Component Date Value Ref Range Status   • SARS Antigen 05/15/2021 Not Detected   Final   • Internal Control 05/15/2021 Passed  Passed Final   • Lot Number 05/15/2021 [id number]   Final   • Expiration Date 05/15/2021 11/08/2021   Final     Assessment/Plan      1. Gastroesophageal reflux disease with esophagitis without hemorrhage    2. Generalized abdominal pain    3. Diarrhea, unspecified type    4. Blood in stool    .       Orders placed during this encounter include:  Orders Placed This Encounter   Procedures   • COVID PRE-OP / PRE-PROCEDURE SCREENING ORDER (NO ISOLATION) - Swab, Nasopharynx     Standing Status:   Future     Standing Expiration Date:   4/28/2023     Order Specific Question:   Release to patient     Answer:   Immediate     Order Specific Question:   Please select your location:     Answer:   [facility]     Order Specific Question:   COVID Screening Order:     Answer:   In-House: PRE-OP: BD MAX, 8-10 HR TAT [[id number]]     Order Specific " Question:   Previously tested for COVID-19?     Answer:   Yes     Order Specific Question:   Employed in healthcare setting?     Answer:   No     Order Specific Question:   Symptomatic for COVID-19 as defined by CDC?     Answer:   No     Order Specific Question:   Hospitalized for COVID-19?     Answer:   No     Order Specific Question:   Admitted to ICU for COVID-19?     Answer:   No     Order Specific Question:   Resident in a congregate (group) care setting?     Answer:   No     Order Specific Question:   Pregnant?     Answer:   No   • Follow Anesthesia Guidelines / Standing Orders     Standing Status:   Future   • Obtain Informed Consent     Standing Status:   Future     Order Specific Question:   Informed Consent Given For     Answer:   ESOPHAGOGASTRODUODENOSCOPY and Colonoscopy       ESOPHAGOGASTRODUODENOSCOPY (N/A), COLONOSCOPY (N/A)    Review and/or summary of lab tests, radiology, procedures, medications. Review and summary of old records and obtaining of history. The risks and benefits of my recommendations, as well as other treatment options were discussed with the patient today. Questions were answered.    New Medications Ordered This Visit   Medications   • dicyclomine (BENTYL) 20 MG tablet     Sig: Take 1 tablet by mouth 4 (Four) Times a Day.     Dispense:  360 tablet     Refill:  1   • omeprazole (priLOSEC) 40 MG capsule     Sig: Take 1 capsule by mouth Daily.     Dispense:  90 capsule     Refill:  1   • ondansetron (ZOFRAN) 4 MG tablet     Sig: Take 1 tablet by mouth Every 8 (Eight) Hours.     Dispense:  20 tablet     Refill:  1   • sodium-potassium-magnesium sulfates (Suprep Bowel Prep Kit) 17.5-3.13-1.6 GM/177ML solution oral solution     Sig: Take 1 bottle by mouth Every 12 (Twelve) Hours.     Dispense:  354 mL     Refill:  0       Follow-up: Return in about 4 weeks (around 5/26/2022) for Recheck, After test.          This document has been electronically signed by DOLORES Messina on April 28,  2022 17:21 CDT           I spent 19 minutes caring for Tete on this date of service. This time includes time spent by me in the following activities:preparing for the visit, reviewing tests, obtaining and/or reviewing a separately obtained history, performing a medically appropriate examination and/or evaluation , counseling and educating the patient/family/caregiver, ordering medications, tests, or procedures, referring and communicating with other health care professionals , documenting information in the medical record and care coordination    Results for orders placed or performed during the hospital encounter of 05/15/21   POCT SARS-CoV-2 Antigen RODGER   ([facility])    Specimen: Swab   Result Value Ref Range    SARS Antigen Not Detected     Internal Control Passed Passed    Lot Number [id number]     Expiration Date 11/08/2021    Results for orders placed or performed in visit on 09/30/20   Sedimentation Rate    Specimen: Blood   Result Value Ref Range    Sed Rate 5 0 - 20 mm/hr   Results for orders placed or performed in visit on 09/30/20   CBC (No Diff)    Specimen: Blood   Result Value Ref Range    WBC 5.16 3.40 - 10.80 10*3/mm3    RBC 4.25 3.77 - 5.28 10*6/mm3    Hemoglobin 13.4 12.0 - 15.9 g/dL    Hematocrit 38.6 34.0 - 46.6 %    MCV 90.8 79.0 - 97.0 fL    MCH 31.5 26.6 - 33.0 pg    MCHC 34.7 31.5 - 35.7 g/dL    RDW 12.5 12.3 - 15.4 %    RDW-SD 40.8 37.0 - 54.0 fl    MPV 9.5 6.0 - 12.0 fL    Platelets 259 140 - 450 10*3/mm3   Comprehensive Metabolic Panel    Specimen: Blood   Result Value Ref Range    Glucose 99 65 - 99 mg/dL    BUN 4 (L) 6 - 20 mg/dL    Creatinine 0.77 0.57 - 1.00 mg/dL    Sodium 141 136 - 145 mmol/L    Potassium 3.8 3.5 - 5.2 mmol/L    Chloride 104 98 - 107 mmol/L    CO2 26.4 22.0 - 29.0 mmol/L    Calcium 9.4 8.6 - 10.5 mg/dL    Total Protein 6.9 6.0 - 8.5 g/dL    Albumin 4.00 3.50 - 5.20 g/dL    ALT (SGPT) 51 (H) 1 - 33 U/L    AST (SGOT) 36 (H) 1 - 32 U/L    Alkaline  Phosphatase 95 39 - 117 U/L    Total Bilirubin 0.3 0.0 - 1.2 mg/dL    eGFR Non African Amer 83 >60 mL/min/1.73    Globulin 2.9 gm/dL    A/G Ratio 1.4 g/dL    BUN/Creatinine Ratio 5.2 (L) 7.0 - 25.0    Anion Gap 10.6 5.0 - 15.0 mmol/L   Results for orders placed or performed during the hospital encounter of 06/08/20   COVID [facility] Priority - Swab, Nasopharynx    Specimen: Nasopharynx; Swab   Result Value Ref Range    COVID [facility] PRIORITY Comment    COVID-19,[facility] ROUTINE, NP/OP SWAB IN TRANSPORT MEDIA OR ESWAB 72 HR TAT - Swab, Nasopharynx    Specimen: Nasopharynx; Swab   Result Value Ref Range    SARS-CoV-2, LILLY Not Detected Not Detected   CBC Auto Differential    Specimen: Blood   Result Value Ref Range    WBC 5.55 3.40 - 10.80 10*3/mm3    RBC 4.63 3.77 - 5.28 10*6/mm3    Hemoglobin 15.0 12.0 - 15.9 g/dL    Hematocrit 43.8 34.0 - 46.6 %    MCV 94.6 79.0 - 97.0 fL    MCH 32.4 26.6 - 33.0 pg    MCHC 34.2 31.5 - 35.7 g/dL    RDW 12.6 12.3 - 15.4 %    RDW-SD 43.5 37.0 - 54.0 fl    MPV 8.9 6.0 - 12.0 fL    Platelets 248 140 - 450 10*3/mm3    Neutrophil % 51.7 42.7 - 76.0 %    Lymphocyte % 37.7 19.6 - 45.3 %    Monocyte % 5.2 5.0 - 12.0 %    Eosinophil % 4.1 0.3 - 6.2 %    Basophil % 1.1 0.0 - 1.5 %    Immature Grans % 0.2 0.0 - 0.5 %    Neutrophils, Absolute 2.87 1.70 - 7.00 10*3/mm3    Lymphocytes, Absolute 2.09 0.70 - 3.10 10*3/mm3    Monocytes, Absolute 0.29 0.10 - 0.90 10*3/mm3    Eosinophils, Absolute 0.23 0.00 - 0.40 10*3/mm3    Basophils, Absolute 0.06 0.00 - 0.20 10*3/mm3    Immature Grans, Absolute 0.01 0.00 - 0.05 10*3/mm3    nRBC 0.0 0.0 - 0.2 /100 WBC   Protime-INR    Specimen: Blood   Result Value Ref Range    Protime 12.7 11.1 - 15.3 Seconds    INR 0.97 0.80 - 1.20   Basic Metabolic Panel    Specimen: Blood   Result Value Ref Range    Glucose 110 (H) 65 - 99 mg/dL    BUN 6 6 - 20 mg/dL    Creatinine 0.85 0.57 - 1.00 mg/dL    Sodium 139 136 - 145 mmol/L    Potassium 4.2 3.5 - 5.2 mmol/L    Chloride 104  98 - 107 mmol/L    CO2 24.0 22.0 - 29.0 mmol/L    Calcium 9.1 8.6 - 10.5 mg/dL    eGFR Non African Amer 75 >60 mL/min/1.73    BUN/Creatinine Ratio 7.1 7.0 - 25.0    Anion Gap 11.0 5.0 - 15.0 mmol/L   Results for orders placed or performed during the hospital encounter of 05/28/20   Stress Test With Myocardial Perfusion Two Day   Result Value Ref Range     CV STRESS PROTOCOL 1 Pharmacologic     Stage 1 1     Duration Min Stage 1 0     Duration Sec Stage 1 10     Stress Dose Regadenoson Stage 1 0.4     Stress Comments Stage 1 10 sec bolus injection     Target HR (85%) 155 bpm    Max. Pred. HR (100%) 182 bpm    HR Stage 1 85     BP Stage 1 131/83     Baseline HR 62 bpm    Baseline /84 mmHg    Peak HR 94 bpm    Percent Max Pred HR 51.65 %    Percent Target HR 61 %    Peak /92 mmHg    Recovery HR 75 bpm    Recovery /92 mmHg    Estimated workload 1.0 METS   Gold Top - SST   Result Value Ref Range    Extra Tube Hold for add-ons.    Gold Top - SST   Result Value Ref Range    Extra Tube Hold for add-ons.    Green Top (Gel)   Result Value Ref Range    Extra Tube Hold for add-ons.    COVID-19, [facility] IN-HOUSE, NP SWAB IN TRANSPORT MEDIA 8-10 HR TAT - Swab, Nasopharynx    Specimen: Nasopharynx; Swab   Result Value Ref Range    COVID19 Not Detected Not Detected - Ref. Range   CBC Auto Differential    Specimen: Blood   Result Value Ref Range    WBC 5.65 3.40 - 10.80 10*3/mm3    RBC 4.85 3.77 - 5.28 10*6/mm3    Hemoglobin 15.4 12.0 - 15.9 g/dL    Hematocrit 46.1 34.0 - 46.6 %    MCV 95.1 79.0 - 97.0 fL    MCH 31.8 26.6 - 33.0 pg    MCHC 33.4 31.5 - 35.7 g/dL    RDW 13.1 12.3 - 15.4 %    RDW-SD 44.9 37.0 - 54.0 fl    MPV 8.6 6.0 - 12.0 fL    Platelets 255 140 - 450 10*3/mm3    Neutrophil % 50.3 42.7 - 76.0 %    Lymphocyte % 39.8 19.6 - 45.3 %    Monocyte % 6.0 5.0 - 12.0 %    Eosinophil % 2.7 0.3 - 6.2 %    Basophil % 0.7 0.0 - 1.5 %    Immature Grans % 0.5 0.0 - 0.5 %    Neutrophils, Absolute 2.84 1.70 - 7.00  10*3/mm3    Lymphocytes, Absolute 2.25 0.70 - 3.10 10*3/mm3    Monocytes, Absolute 0.34 0.10 - 0.90 10*3/mm3    Eosinophils, Absolute 0.15 0.00 - 0.40 10*3/mm3    Basophils, Absolute 0.04 0.00 - 0.20 10*3/mm3    Immature Grans, Absolute 0.03 0.00 - 0.05 10*3/mm3    nRBC 0.0 0.0 - 0.2 /100 WBC     *Note: Due to a large number of results and/or encounters for the requested time period, some results have not been displayed. A complete set of results can be found in Results Review.

## 2022-04-29 RX ORDER — ONDANSETRON 4 MG/1
4 TABLET, ORALLY DISINTEGRATING ORAL EVERY 8 HOURS PRN
Qty: 30 TABLET | Refills: 1 | Status: SHIPPED | OUTPATIENT
Start: 2022-04-29 | End: 2022-05-17

## 2022-04-29 RX ORDER — ONDANSETRON 4 MG/1
4 TABLET, ORALLY DISINTEGRATING ORAL EVERY 8 HOURS PRN
Qty: 30 TABLET | Refills: 1 | Status: SHIPPED | OUTPATIENT
Start: 2022-04-29 | End: 2022-04-29 | Stop reason: SDUPTHER

## 2022-05-17 RX ORDER — ONDANSETRON 4 MG/1
TABLET, ORALLY DISINTEGRATING ORAL
Qty: 30 TABLET | Refills: 1 | Status: SHIPPED | OUTPATIENT
Start: 2022-05-17 | End: 2022-06-16

## 2022-06-16 RX ORDER — ONDANSETRON 4 MG/1
TABLET, ORALLY DISINTEGRATING ORAL
Qty: 30 TABLET | Refills: 1 | Status: SHIPPED | OUTPATIENT
Start: 2022-06-16 | End: 2022-07-14

## 2022-07-14 RX ORDER — ONDANSETRON 4 MG/1
TABLET, ORALLY DISINTEGRATING ORAL
Qty: 30 TABLET | Refills: 1 | Status: SHIPPED | OUTPATIENT
Start: 2022-07-14 | End: 2022-08-04

## 2022-08-04 RX ORDER — ONDANSETRON 4 MG/1
TABLET, ORALLY DISINTEGRATING ORAL
Qty: 30 TABLET | Refills: 1 | Status: SHIPPED | OUTPATIENT
Start: 2022-08-04 | End: 2022-12-24 | Stop reason: SDUPTHER

## 2022-08-22 ENCOUNTER — ANESTHESIA EVENT (OUTPATIENT)
Dept: GASTROENTEROLOGY | Facility: HOSPITAL | Age: 42
End: 2022-08-22

## 2022-08-22 ENCOUNTER — ANESTHESIA (OUTPATIENT)
Dept: GASTROENTEROLOGY | Facility: HOSPITAL | Age: 42
End: 2022-08-22

## 2022-08-25 RX ORDER — ONDANSETRON 4 MG/1
TABLET, ORALLY DISINTEGRATING ORAL
Qty: 30 TABLET | Refills: 1 | OUTPATIENT
Start: 2022-08-25

## 2022-09-12 RX ORDER — ONDANSETRON 4 MG/1
TABLET, ORALLY DISINTEGRATING ORAL
Qty: 30 TABLET | Refills: 1 | OUTPATIENT
Start: 2022-09-12

## 2022-09-19 RX ORDER — ONDANSETRON 4 MG/1
TABLET, ORALLY DISINTEGRATING ORAL
Qty: 30 TABLET | Refills: 1 | OUTPATIENT
Start: 2022-09-19

## 2022-12-24 PROCEDURE — 87081 CULTURE SCREEN ONLY: CPT | Performed by: NURSE PRACTITIONER

## 2022-12-27 ENCOUNTER — LAB (OUTPATIENT)
Dept: LAB | Facility: OTHER | Age: 42
End: 2022-12-27

## 2023-09-26 ENCOUNTER — HOSPITAL ENCOUNTER (EMERGENCY)
Facility: HOSPITAL | Age: 43
Discharge: HOME | End: 2023-09-26
Attending: FAMILY MEDICINE | Admitting: EMERGENCY MEDICINE
Payer: COMMERCIAL

## 2023-09-26 ENCOUNTER — APPOINTMENT (OUTPATIENT)
Dept: GENERAL RADIOLOGY | Facility: HOSPITAL | Age: 43
End: 2023-09-26
Payer: COMMERCIAL

## 2023-09-26 VITALS
OXYGEN SATURATION: 94 % | HEART RATE: 54 BPM | RESPIRATION RATE: 19 BRPM | WEIGHT: 190 LBS | HEIGHT: 60 IN | DIASTOLIC BLOOD PRESSURE: 94 MMHG | TEMPERATURE: 98.2 F | BODY MASS INDEX: 37.3 KG/M2 | SYSTOLIC BLOOD PRESSURE: 146 MMHG

## 2023-09-26 DIAGNOSIS — R07.9 CHEST PAIN, UNSPECIFIED TYPE: Primary | ICD-10-CM

## 2023-09-26 LAB
ALBUMIN SERPL-MCNC: 4.7 G/DL (ref 3.5–5.2)
ALBUMIN/GLOB SERPL: 1.6 G/DL
ALP SERPL-CCNC: 108 U/L (ref 39–117)
ALT SERPL W P-5'-P-CCNC: 29 U/L (ref 1–33)
ANION GAP SERPL CALCULATED.3IONS-SCNC: 10 MMOL/L (ref 5–15)
AST SERPL-CCNC: 18 U/L (ref 1–32)
BASOPHILS # BLD AUTO: 0.06 10*3/MM3 (ref 0–0.2)
BASOPHILS NFR BLD AUTO: 0.8 % (ref 0–1.5)
BILIRUB SERPL-MCNC: 0.4 MG/DL (ref 0–1.2)
BUN SERPL-MCNC: 13 MG/DL (ref 6–20)
BUN/CREAT SERPL: 14.4 (ref 7–25)
CALCIUM SPEC-SCNC: 9.7 MG/DL (ref 8.6–10.5)
CHLORIDE SERPL-SCNC: 102 MMOL/L (ref 98–107)
CK SERPL-CCNC: 40 U/L (ref 20–180)
CO2 SERPL-SCNC: 29 MMOL/L (ref 22–29)
CREAT SERPL-MCNC: 0.9 MG/DL (ref 0.57–1)
DEPRECATED RDW RBC AUTO: 42 FL (ref 37–54)
EGFRCR SERPLBLD CKD-EPI 2021: 82 ML/MIN/1.73
EOSINOPHIL # BLD AUTO: 0.17 10*3/MM3 (ref 0–0.4)
EOSINOPHIL NFR BLD AUTO: 2.1 % (ref 0.3–6.2)
ERYTHROCYTE [DISTWIDTH] IN BLOOD BY AUTOMATED COUNT: 12.7 % (ref 12.3–15.4)
GEN 5 2HR TROPONIN T REFLEX: <6 NG/L
GLOBULIN UR ELPH-MCNC: 2.9 GM/DL
GLUCOSE SERPL-MCNC: 111 MG/DL (ref 65–99)
HCT VFR BLD AUTO: 47.7 % (ref 34–46.6)
HGB BLD-MCNC: 15.7 G/DL (ref 12–15.9)
HOLD SPECIMEN: NORMAL
IMM GRANULOCYTES # BLD AUTO: 0.03 10*3/MM3 (ref 0–0.05)
IMM GRANULOCYTES NFR BLD AUTO: 0.4 % (ref 0–0.5)
LIPASE SERPL-CCNC: 21 U/L (ref 13–60)
LYMPHOCYTES # BLD AUTO: 2.47 10*3/MM3 (ref 0.7–3.1)
LYMPHOCYTES NFR BLD AUTO: 31.1 % (ref 19.6–45.3)
MAGNESIUM SERPL-MCNC: 2.2 MG/DL (ref 1.6–2.6)
MCH RBC QN AUTO: 30 PG (ref 26.6–33)
MCHC RBC AUTO-ENTMCNC: 32.9 G/DL (ref 31.5–35.7)
MCV RBC AUTO: 91 FL (ref 79–97)
MONOCYTES # BLD AUTO: 0.48 10*3/MM3 (ref 0.1–0.9)
MONOCYTES NFR BLD AUTO: 6 % (ref 5–12)
NEUTROPHILS NFR BLD AUTO: 4.74 10*3/MM3 (ref 1.7–7)
NEUTROPHILS NFR BLD AUTO: 59.6 % (ref 42.7–76)
NRBC BLD AUTO-RTO: 0 /100 WBC (ref 0–0.2)
NT-PROBNP SERPL-MCNC: 66.3 PG/ML (ref 0–450)
PLATELET # BLD AUTO: 277 10*3/MM3 (ref 140–450)
PMV BLD AUTO: 8.4 FL (ref 6–12)
POTASSIUM SERPL-SCNC: 4.3 MMOL/L (ref 3.5–5.2)
PROT SERPL-MCNC: 7.6 G/DL (ref 6–8.5)
RBC # BLD AUTO: 5.24 10*6/MM3 (ref 3.77–5.28)
SODIUM SERPL-SCNC: 141 MMOL/L (ref 136–145)
TROPONIN T DELTA: NORMAL
TROPONIN T SERPL HS-MCNC: <6 NG/L
WBC NRBC COR # BLD: 7.95 10*3/MM3 (ref 3.4–10.8)
WHOLE BLOOD HOLD COAG: NORMAL
WHOLE BLOOD HOLD SPECIMEN: NORMAL

## 2023-09-26 PROCEDURE — 71045 X-RAY EXAM CHEST 1 VIEW: CPT

## 2023-09-26 PROCEDURE — 83880 ASSAY OF NATRIURETIC PEPTIDE: CPT

## 2023-09-26 PROCEDURE — 80053 COMPREHEN METABOLIC PANEL: CPT

## 2023-09-26 PROCEDURE — 85025 COMPLETE CBC W/AUTO DIFF WBC: CPT

## 2023-09-26 PROCEDURE — 36415 COLL VENOUS BLD VENIPUNCTURE: CPT

## 2023-09-26 PROCEDURE — 93010 ELECTROCARDIOGRAM REPORT: CPT | Performed by: INTERNAL MEDICINE

## 2023-09-26 PROCEDURE — 93005 ELECTROCARDIOGRAM TRACING: CPT | Performed by: FAMILY MEDICINE

## 2023-09-26 PROCEDURE — 82550 ASSAY OF CK (CPK): CPT | Performed by: FAMILY MEDICINE

## 2023-09-26 PROCEDURE — 83735 ASSAY OF MAGNESIUM: CPT | Performed by: FAMILY MEDICINE

## 2023-09-26 PROCEDURE — 83690 ASSAY OF LIPASE: CPT | Performed by: FAMILY MEDICINE

## 2023-09-26 PROCEDURE — 84484 ASSAY OF TROPONIN QUANT: CPT

## 2023-09-26 PROCEDURE — 93005 ELECTROCARDIOGRAM TRACING: CPT

## 2023-09-26 PROCEDURE — 99284 EMERGENCY DEPT VISIT MOD MDM: CPT

## 2023-09-26 RX ORDER — SODIUM CHLORIDE 0.9 % (FLUSH) 0.9 %
10 SYRINGE (ML) INJECTION AS NEEDED
Status: DISCONTINUED | OUTPATIENT
Start: 2023-09-26 | End: 2023-09-26 | Stop reason: HOSPADM

## 2023-09-26 NOTE — ED PROVIDER NOTES
Subjective   History of Present Illness  Patient presents to the emergency department with chest pain that began at 1 PM today.  She had a heart cath performed 3 years ago by Dr. Lubin and did not have any stents placed.  She states that her chest pain today improved with nitroglycerin.  Patient is a current smoker with a history of hypertension and hypercholesterolemia.  She does not have a history of diabetes mellitus.    Chest Pain  Pain location:  R chest  Pain quality: sharp    Pain radiates to:  Upper back  Pain severity:  Moderate  Onset quality:  Sudden  Context: at rest    Relieved by:  Nitroglycerin  Worsened by:  Nothing  Associated symptoms: nausea    Associated symptoms: no abdominal pain, no cough, no diaphoresis, no dizziness, no dysphagia, no fatigue, no fever, no headache, no shortness of breath, no vomiting and no weakness    Risk factors: coronary artery disease, high cholesterol, hypertension, obesity and smoking    Risk factors: no diabetes mellitus      Review of Systems   Constitutional:  Negative for appetite change, chills, diaphoresis, fatigue and fever.   HENT:  Negative for congestion, ear discharge, ear pain, nosebleeds, rhinorrhea, sinus pressure, sore throat and trouble swallowing.    Eyes:  Negative for discharge and redness.   Respiratory:  Negative for apnea, cough, chest tightness, shortness of breath and wheezing.    Cardiovascular:  Positive for chest pain.   Gastrointestinal:  Positive for nausea. Negative for abdominal pain, diarrhea and vomiting.   Endocrine: Negative for polyuria.   Genitourinary:  Negative for dysuria, frequency and urgency.   Musculoskeletal:  Negative for myalgias and neck pain.   Skin:  Negative for color change and rash.   Allergic/Immunologic: Negative for immunocompromised state.   Neurological:  Negative for dizziness, seizures, syncope, weakness, light-headedness and headaches.   Hematological:  Negative for adenopathy. Does not bruise/bleed  easily.   Psychiatric/Behavioral:  Negative for behavioral problems and confusion.    All other systems reviewed and are negative.    Past Medical History:   Diagnosis Date    Acute maxillary sinusitis     Adjustment disorder with anxiety     Adjustment disorder with anxious mood     Biliary calculus     post cholecystectomy       Candidiasis     Central abdominal pain     Chronic pain     Community acquired pneumonia     Dental abscess     Dental caries     o/e    Depressive disorder      with some anxiety       Depressive disorder     not elsewhere classified       Essential hypertension     Essential hypertension     Frequent hospital admissions     Gastroesophageal reflux disease     Gastroesophageal reflux disease     Generalized anxiety disorder     Hyperlipidemia     Hyperreflexia     Influenza     needs immunization    Irritable bowel syndrome     Major depressive disorder     Malaise and fatigue     Multiple joint pain     Nausea and vomiting     Need for prophylactic vaccination against Streptococcus pneumoniae (pneumococcus)     Obesity     Rhonchi     low-pitched    Seizures     Tobacco dependence syndrome     Upper respiratory infection        Allergies   Allergen Reactions    Cefaclor Anaphylaxis    Valacyclovir Hcl Itching and Anaphylaxis    Valtrex [Valacyclovir] Anaphylaxis    Vancomycin Angioedema and Anaphylaxis    Azithromycin Nausea And Vomiting     z-iam    Reglan [Metoclopramide] Mental Status Change    Cephalexin Rash       Past Surgical History:   Procedure Laterality Date    CARDIAC CATHETERIZATION N/A 6/8/2020    Procedure: Left Heart Cath- radial  6/8/20 @ 9;  Surgeon: Garo Lubin MD;  Location: [facility] CATH INVASIVE LOCATION;  Service: Cardiology;  Laterality: N/A;    CHOLECYSTECTOMY      laparoscopic (With cholangiogram. Symptomatic gallstones.)   06/03/2015     COLONOSCOPY N/A 2/20/2019    Procedure: COLONOSCOPY;  Surgeon: Virgil Aiken MD;  Location: [facility] ENDOSCOPY;  Service:  Gastroenterology    ENDOSCOPY      w/ biopsy [id number] (Gastritis found inthe body of the stomach. EGD with biopsy.)   07/21/2015     ENDOSCOPY      w/ tube [id number] (Normal esophagus. Gastritis in stomach. Biopsy taken. Normal duodenum. Biopsy taken.)   10/19/2011     ENDOSCOPY N/A 11/1/2019    Procedure: ESOPHAGOGASTRODUODENOSCOPY possible dilation;  Surgeon: Virgil Aiken MD;  Location: [facility] ENDOSCOPY;  Service: Gastroenterology    HYSTEROSCOPY  07/09/2008    TOTAL ABDOMINAL HYSTERECTOMY WITH SALPINGO OOPHORECTOMY  09/03/2008    TUBAL ABDOMINAL LIGATION  08/21/2006    VAGINAL DELIVERY      , P2, had pre-eclampsia with both       Family History   Problem Relation Age of Onset    Other Other         depressive disorder    Diabetes Other     Hypertension Other     Other Other         Mother is diabetic, hypertensive, anxiety, depression. Father is 61, diabetic, hypertensive, had a CABG, first MI was at 50. He has had CVA's, TIA's. 10-year-old sister who is Type I diabetic       Social History     Socioeconomic History    Marital status:    Tobacco Use    Smoking status: Every Day     Packs/day: 0.25     Types: Electronic Cigarette, Cigarettes    Smokeless tobacco: Never   Substance and Sexual Activity    Alcohol use: No    Drug use: No           Objective   Physical Exam  Vitals and nursing note reviewed.   Constitutional:       Appearance: She is well-developed.   HENT:      Head: Normocephalic and atraumatic.      Nose: Nose normal.   Eyes:      General: No scleral icterus.        Right eye: No discharge.         Left eye: No discharge.      Conjunctiva/sclera: Conjunctivae normal.      Pupils: Pupils are equal, round, and reactive to light.   Neck:      Trachea: No tracheal deviation.   Cardiovascular:      Rate and Rhythm: Normal rate and regular rhythm.      Heart sounds: Normal heart sounds. No murmur heard.  Pulmonary:      Effort: Pulmonary effort is normal. No respiratory distress.      Breath sounds:  Normal breath sounds. No stridor. No wheezing or rales.   Abdominal:      General: Bowel sounds are normal. There is no distension.      Palpations: Abdomen is soft. There is no mass.      Tenderness: There is no abdominal tenderness. There is no guarding or rebound.   Musculoskeletal:      Cervical back: Normal range of motion and neck supple.   Skin:     General: Skin is warm and dry.      Findings: No erythema or rash.   Neurological:      Mental Status: She is alert and oriented to person, place, and time.      Coordination: Coordination normal.   Psychiatric:         Behavior: Behavior normal.         Thought Content: Thought content normal.       Procedures           ED Course                      HEART Score: 2                      Medical Decision Making  Case excepted in signout from Dr. Davidson.  This is a 43-year-old female who presents for chest pain.  No evidence as of yet of acute coronary syndrome.  Dr. Davidson suspects that if the second troponin is negative the patient can be discharged to follow-up with cardiology on an outpatient basis.    Problems Addressed:  Chest pain, unspecified type: acute illness or injury    Amount and/or Complexity of Data Reviewed  ECG/medicine tests: ordered and independent interpretation performed.     Details: EKG interpretation: Inter by myself.  Normal sinus rhythm.  Rate 64.  Normal P wave and ME interval.  Normal QRS and axis.  Normal QTc interval.  ST segments are normal.    Risk  OTC drugs.  Prescription drug management.        Final diagnoses:   Chest pain, unspecified type       ED Disposition  ED Disposition       ED Disposition   Discharge    Condition   Stable    Comment   --               Alfred Stahl P, APRN  [address] [facility] [address] [phone]    Call in 1 day  To make an appointment to be reevaluated after an emergency department visit.    Anisa Bang MD  [address] [place] [address] 1ST FLOOR  [facility] [address] [phone]    Call in  1 day  To make an appointment to be reevaluated for chest pain after an emergency department visit and to consider a stress test.    [facility] EMERGENCY DEPARTMENT  [facility] [address] [phone]    As needed, If symptoms worsen at any time         Medication List      No changes were made to your prescriptions during this visit.            Virgil Iqbal,   09/27/23 0541

## 2023-09-26 NOTE — Clinical Note
[facility] EMERGENCY DEPARTMENT  [address] [place] [id number]  Phone: [phone]    Tete Chen was seen and treated in our emergency department on 9/26/2023.  She may return to work on 09/28/2023.         Thank you for choosing [facility].    Virgil Iqbal, DO

## 2023-09-29 LAB
QT INTERVAL: 448 MS
QTC INTERVAL: 462 MS

## (undated) DEVICE — SINGLE-USE BIOPSY FORCEPS: Brand: RADIAL JAW 4

## (undated) DEVICE — ELECTRODE,RT,STRESS,FOAM,50PK: Brand: MEDLINE

## (undated) DEVICE — BITEBLOCK ENDO W/STRAP 60F A/ LF DISP

## (undated) DEVICE — PK CATH LAB 60

## (undated) DEVICE — PERCLOSE PROGLIDE™ SUTURE-MEDIATED CLOSURE SYSTEM: Brand: PERCLOSE PROGLIDE™

## (undated) DEVICE — CATH DIAG EXPO M/ PK 6FR FL4/FR4 PIG 3PK

## (undated) DEVICE — GW PERIPH GUIDERIGHT STD/J/TP PTFE/PCOAT SS 0.038IN 5X150CM

## (undated) DEVICE — MODEL BT2000 P/N 700287-012KIT CONTENTS: MANIFOLD WITH SALINE AND CONTRAST PORTS, SALINE TUBING WITH SPIKE AND HAND SYRINGE, TRANSDUCER: Brand: BT2000 AUTOMATED MANIFOLD KIT

## (undated) DEVICE — TRAP,MUCUS SPECIMEN,40CC: Brand: MEDLINE

## (undated) DEVICE — INTRO SHEATH ART/FEM ENGAGE .038 6F12CM

## (undated) DEVICE — KT INTRO MINISTICK MAX W/GW NITNL/TUNG ECHO 4F 21G 7CM